# Patient Record
Sex: FEMALE | Race: WHITE | NOT HISPANIC OR LATINO | Employment: UNEMPLOYED | URBAN - METROPOLITAN AREA
[De-identification: names, ages, dates, MRNs, and addresses within clinical notes are randomized per-mention and may not be internally consistent; named-entity substitution may affect disease eponyms.]

---

## 2019-08-27 ENCOUNTER — OFFICE VISIT (OUTPATIENT)
Dept: INTERNAL MEDICINE | Facility: CLINIC | Age: 24
End: 2019-08-27

## 2019-08-27 VITALS
OXYGEN SATURATION: 97 % | RESPIRATION RATE: 16 BRPM | TEMPERATURE: 97.8 F | HEIGHT: 66 IN | DIASTOLIC BLOOD PRESSURE: 72 MMHG | HEART RATE: 83 BPM | BODY MASS INDEX: 40.63 KG/M2 | WEIGHT: 252.8 LBS | SYSTOLIC BLOOD PRESSURE: 122 MMHG

## 2019-08-27 DIAGNOSIS — D22.9 CHANGE IN MOLE: Primary | ICD-10-CM

## 2019-08-27 DIAGNOSIS — Z85.820 HISTORY OF MELANOMA: ICD-10-CM

## 2019-08-27 DIAGNOSIS — Z3A.28 28 WEEKS GESTATION OF PREGNANCY: ICD-10-CM

## 2019-08-27 DIAGNOSIS — Z13.89 ENCOUNTER FOR SCREENING FOR OTHER DISORDER: ICD-10-CM

## 2019-08-27 PROBLEM — Z33.1 PREGNANCY, INCIDENTAL: Status: ACTIVE | Noted: 2019-08-27

## 2019-08-27 LAB
ALBUMIN SERPL-MCNC: 4 G/DL (ref 3.5–5.2)
ALBUMIN/GLOB SERPL: 1.3 G/DL
ALP SERPL-CCNC: 75 U/L (ref 39–117)
ALT SERPL W P-5'-P-CCNC: 7 U/L (ref 1–33)
ANION GAP SERPL CALCULATED.3IONS-SCNC: 11.7 MMOL/L (ref 5–15)
AST SERPL-CCNC: 15 U/L (ref 1–32)
BASOPHILS # BLD AUTO: 0.04 10*3/MM3 (ref 0–0.2)
BASOPHILS NFR BLD AUTO: 0.3 % (ref 0–1.5)
BILIRUB SERPL-MCNC: 0.2 MG/DL (ref 0.2–1.2)
BUN BLD-MCNC: 6 MG/DL (ref 6–20)
BUN/CREAT SERPL: 11.5 (ref 7–25)
CALCIUM SPEC-SCNC: 9.6 MG/DL (ref 8.6–10.5)
CHLORIDE SERPL-SCNC: 102 MMOL/L (ref 98–107)
CHOLEST SERPL-MCNC: 231 MG/DL (ref 0–200)
CO2 SERPL-SCNC: 22.3 MMOL/L (ref 22–29)
CREAT BLD-MCNC: 0.52 MG/DL (ref 0.57–1)
DEPRECATED RDW RBC AUTO: 47.2 FL (ref 37–54)
EOSINOPHIL # BLD AUTO: 0.06 10*3/MM3 (ref 0–0.4)
EOSINOPHIL NFR BLD AUTO: 0.4 % (ref 0.3–6.2)
ERYTHROCYTE [DISTWIDTH] IN BLOOD BY AUTOMATED COUNT: 14 % (ref 12.3–15.4)
GFR SERPL CREATININE-BSD FRML MDRD: 145 ML/MIN/1.73
GLOBULIN UR ELPH-MCNC: 3 GM/DL
GLUCOSE BLD-MCNC: 95 MG/DL (ref 65–99)
HCT VFR BLD AUTO: 39.2 % (ref 34–46.6)
HDLC SERPL-MCNC: 58 MG/DL (ref 40–60)
HGB BLD-MCNC: 12.2 G/DL (ref 12–15.9)
IMM GRANULOCYTES # BLD AUTO: 0.09 10*3/MM3 (ref 0–0.05)
IMM GRANULOCYTES NFR BLD AUTO: 0.6 % (ref 0–0.5)
LDLC SERPL CALC-MCNC: 115 MG/DL (ref 0–100)
LDLC/HDLC SERPL: 1.98 {RATIO}
LYMPHOCYTES # BLD AUTO: 3.41 10*3/MM3 (ref 0.7–3.1)
LYMPHOCYTES NFR BLD AUTO: 23 % (ref 19.6–45.3)
MCH RBC QN AUTO: 29.1 PG (ref 26.6–33)
MCHC RBC AUTO-ENTMCNC: 31.1 G/DL (ref 31.5–35.7)
MCV RBC AUTO: 93.6 FL (ref 79–97)
MONOCYTES # BLD AUTO: 0.84 10*3/MM3 (ref 0.1–0.9)
MONOCYTES NFR BLD AUTO: 5.7 % (ref 5–12)
NEUTROPHILS # BLD AUTO: 10.38 10*3/MM3 (ref 1.7–7)
NEUTROPHILS NFR BLD AUTO: 70 % (ref 42.7–76)
NRBC BLD AUTO-RTO: 0 /100 WBC (ref 0–0.2)
PLATELET # BLD AUTO: 525 10*3/MM3 (ref 140–450)
PMV BLD AUTO: 12.3 FL (ref 6–12)
POTASSIUM BLD-SCNC: 4 MMOL/L (ref 3.5–5.2)
PROT SERPL-MCNC: 7 G/DL (ref 6–8.5)
RBC # BLD AUTO: 4.19 10*6/MM3 (ref 3.77–5.28)
SODIUM BLD-SCNC: 136 MMOL/L (ref 136–145)
T4 FREE SERPL-MCNC: 1.08 NG/DL (ref 0.93–1.7)
TRIGL SERPL-MCNC: 291 MG/DL (ref 0–150)
TSH SERPL DL<=0.05 MIU/L-ACNC: 1.79 MIU/ML (ref 0.27–4.2)
VLDLC SERPL-MCNC: 58.2 MG/DL (ref 5–40)
WBC NRBC COR # BLD: 14.82 10*3/MM3 (ref 3.4–10.8)

## 2019-08-27 PROCEDURE — 99204 OFFICE O/P NEW MOD 45 MIN: CPT | Performed by: NURSE PRACTITIONER

## 2019-08-27 PROCEDURE — 85025 COMPLETE CBC W/AUTO DIFF WBC: CPT | Performed by: NURSE PRACTITIONER

## 2019-08-27 PROCEDURE — 80061 LIPID PANEL: CPT | Performed by: NURSE PRACTITIONER

## 2019-08-27 PROCEDURE — 84443 ASSAY THYROID STIM HORMONE: CPT | Performed by: NURSE PRACTITIONER

## 2019-08-27 PROCEDURE — 80053 COMPREHEN METABOLIC PANEL: CPT | Performed by: NURSE PRACTITIONER

## 2019-08-27 PROCEDURE — 84439 ASSAY OF FREE THYROXINE: CPT | Performed by: NURSE PRACTITIONER

## 2019-08-27 RX ORDER — ACETAMINOPHEN 500 MG
500 TABLET ORAL EVERY 6 HOURS PRN
COMMUNITY
End: 2021-08-04

## 2019-08-27 RX ORDER — CALCIUM CARBONATE 200(500)MG
1 TABLET,CHEWABLE ORAL DAILY
COMMUNITY
End: 2020-02-17

## 2019-08-27 RX ORDER — LANOLIN ALCOHOL/MO/W.PET/CERES
1000 CREAM (GRAM) TOPICAL DAILY
COMMUNITY
End: 2020-02-17

## 2019-08-27 NOTE — PROGRESS NOTES
"Subjective:    Genesis Bowden is a 24 y.o. female.     Chief Complaint   Patient presents with   • Establish Care   • Referral to Derm        History of Present Illness   Patient present with significant other to establish care and request dermatology referral for multiple moles that have changed. Moles with color changes and shape changes/dark dots. Mole on top of right ear and right lower cheek/jaw bleed when caught on objects. She reports she had melanoma in 2008 that was treated as \"metastatic breast cancer\" due to being in breast tissue with radiation and reconstruction surgeries. She reports she was treated in [place], [place]. Partner is particularly concerned about mole changes.     She has been in [place] one year.     She reports she is 28 weeks pregnant and attends [facility] and sees Dr Sourav Lopez, OB/GYN, with 11/12/2019 caesarean section scheduled. She has a 3 year old son and reports expected child is son as well.      Current Outpatient Medications:   •  acetaminophen (TYLENOL) 500 MG tablet, Take 500 mg by mouth Every 6 (Six) Hours As Needed for Mild Pain ., Disp: , Rfl:   •  calcium carbonate (TUMS) 500 MG chewable tablet, Chew 1 tablet Daily., Disp: , Rfl:   •  Prenatal Vit-Fe Fumarate-FA (PRENATAL COMPLETE PO), Take  by mouth., Disp: , Rfl:   •  vitamin B-12 (CYANOCOBALAMIN) 1000 MCG tablet, Take 1,000 mcg by mouth Daily., Disp: , Rfl:      The following portions of the patient's history were reviewed and updated as appropriate: allergies, current medications, past family history, past medical history, past social history, past surgical history and problem list.    Review of Systems   Constitutional: Negative for chills, fatigue and fever.   HENT: Negative for congestion, ear pain, postnasal drip, rhinorrhea, sinus pressure, sneezing and sore throat.    Eyes: Negative for pain, discharge, redness and itching.   Respiratory: Negative for cough, chest tightness, " "shortness of breath and wheezing.    Cardiovascular: Negative for chest pain.   Gastrointestinal: Negative for abdominal pain, diarrhea, nausea and vomiting.   Endocrine: Negative.    Genitourinary:        Currently pregnant   Musculoskeletal: Negative for arthralgias and myalgias.   Skin: Negative for rash.        Multiple moles   Allergic/Immunologic: Positive for environmental allergies.   Neurological: Negative for headaches.   Hematological: Negative for adenopathy.       Objective:    /72   Pulse 83   Temp 97.8 °F (36.6 °C) (Temporal)   Resp 16   Ht 167.6 cm (66\")   Wt 115 kg (252 lb 12.8 oz)   SpO2 97%   Breastfeeding? No Comment: pregnant  BMI 40.80 kg/m²     Physical Exam   Constitutional: She is oriented to person, place, and time. She appears well-developed and well-nourished. She is active and cooperative.  Non-toxic appearance. She does not have a sickly appearance. She does not appear ill. No distress.   HENT:   Head: Normocephalic and atraumatic.   Right Ear: Tympanic membrane, external ear and ear canal normal.   Left Ear: Tympanic membrane, external ear and ear canal normal.   Nose: Nose normal.   Mouth/Throat: Uvula is midline, oropharynx is clear and moist and mucous membranes are normal. No oral lesions.   Bulbous nevus at right pinna   Eyes: Conjunctivae and lids are normal.   Neck: Normal range of motion. Neck supple.   Cardiovascular: Normal rate and regular rhythm.   No murmur heard.  Pulmonary/Chest: Effort normal and breath sounds normal.   Lymphadenopathy:     She has no cervical adenopathy.   Neurological: She is alert and oriented to person, place, and time.   Skin: Skin is warm, dry and intact. No rash noted.   Nevi too multiple to count with varied color, shape and irregularities. Multiple scars on bilateral breasts. Bulbous nevus at right pinna and right lower cheek/jawline.     Psychiatric: She has a normal mood and affect.   Nursing note and vitals " reviewed.      Assessment/Plan:    Genesis was seen today for establish care and referral to derm.    Diagnoses and all orders for this visit:    Change in mole  -     Ambulatory Referral to Dermatology    Encounter for screening for other disorder  -     T4, Free  -     TSH  -     CBC & Differential  -     Comprehensive Metabolic Panel  -     Lipid Panel  -     CBC Auto Differential    History of melanoma  -     Ambulatory Referral to Dermatology    28 weeks gestation of pregnancy    Continue prenatal care, vitamin and sign release for medical records.     Return in about 1 month (around 9/27/2019), or if symptoms worsen or fail to improve, schedule physical.

## 2020-02-17 ENCOUNTER — OFFICE VISIT (OUTPATIENT)
Dept: INTERNAL MEDICINE | Facility: CLINIC | Age: 25
End: 2020-02-17

## 2020-02-17 VITALS
BODY MASS INDEX: 39.26 KG/M2 | SYSTOLIC BLOOD PRESSURE: 120 MMHG | HEIGHT: 67 IN | OXYGEN SATURATION: 98 % | TEMPERATURE: 97.5 F | RESPIRATION RATE: 20 BRPM | DIASTOLIC BLOOD PRESSURE: 76 MMHG | WEIGHT: 250.13 LBS | HEART RATE: 105 BPM

## 2020-02-17 DIAGNOSIS — N63.0 BREAST MASS: Primary | ICD-10-CM

## 2020-02-17 DIAGNOSIS — N64.4 BREAST PAIN, LEFT: ICD-10-CM

## 2020-02-17 PROCEDURE — 99213 OFFICE O/P EST LOW 20 MIN: CPT | Performed by: INTERNAL MEDICINE

## 2020-02-17 NOTE — PROGRESS NOTES
Subjective   Genesis Chapin is a 24 y.o. female.     History of Present Illness     The following portions of the patient's history were reviewed and updated as appropriate: allergies, current medications, past family history, past medical history, past social history, past surgical history and problem list.    Establish visit    Left breast pain   Duration 1week   Sx Patient says that she felt the pain around her lateral side of breast and palpation in that area.  Patient denies any trauma to this particular region, no nipple discharge, no swelling, no erythema, no other systemic signs.      Family History   Aunt (maternal) stage 2 breast cancer  Mother had an abnormal mammogram, dx cyst and bx was unkown    Past Medical History   Malignant melanoma of the breast age 13       Social History: No EtOH consumption, no IV drug use, no marijuana, no illicit drugs    Review of Systems   Constitutional: Negative for appetite change, chills, fatigue, fever and unexpected weight gain.   Respiratory: Negative for cough, chest tightness, shortness of breath and wheezing.    Gastrointestinal: Negative for abdominal pain, constipation, nausea, rectal pain and vomiting.   Genitourinary: Positive for breast pain. Negative for breast discharge.   All other systems reviewed and are negative.      Objective   Physical Exam   Constitutional: She is oriented to person, place, and time. She appears well-developed and well-nourished.   HENT:   Head: Normocephalic.   Right Ear: External ear normal.   Left Ear: External ear normal.   Nose: Nose normal.   Mouth/Throat: Oropharynx is clear and moist.   Eyes: Pupils are equal, round, and reactive to light. Conjunctivae and EOM are normal.   Neck: Normal range of motion. Neck supple.   Cardiovascular: Normal rate, regular rhythm, normal heart sounds and intact distal pulses.   Pulmonary/Chest: Effort normal and breath sounds normal.   Musculoskeletal: Normal range of motion.   Breast exam  revealed large pendulous breasts.  Soreness in discomfort/pain palpated between 1 and 3:00 on the lateral aspect of left breast approximately 3 cm from the nipple.   Neurological: She is alert and oriented to person, place, and time.   Nursing note and vitals reviewed.        Assessment/Plan   Genesis was seen today for breast pain.    Diagnoses and all orders for this visit:    Breast mass  -     US breast left complete; Future    Breast pain, left  -     US breast left complete; Future  -     Ambulatory Referral to Genetic Counseling (Arjun)    Also recommended patient to continue with NSAIDs and warm compress to the breast area to see if this will provide some relief.

## 2020-02-26 NOTE — ADDENDUM NOTE
Addended by: DORIS BLACKWELL on: 2/26/2020 03:46 AM     Modules accepted: Level of Service     boyfriend

## 2020-02-27 ENCOUNTER — HOSPITAL ENCOUNTER (OUTPATIENT)
Dept: ULTRASOUND IMAGING | Facility: HOSPITAL | Age: 25
Discharge: HOME | End: 2020-02-27
Admitting: INTERNAL MEDICINE

## 2020-02-27 DIAGNOSIS — N63.0 BREAST MASS: ICD-10-CM

## 2020-02-27 DIAGNOSIS — N64.4 BREAST PAIN, LEFT: ICD-10-CM

## 2020-02-27 PROCEDURE — 76642 ULTRASOUND BREAST LIMITED: CPT | Performed by: RADIOLOGY

## 2020-02-27 PROCEDURE — 76642 ULTRASOUND BREAST LIMITED: CPT

## 2020-05-13 ENCOUNTER — TELEMEDICINE (OUTPATIENT)
Dept: INTERNAL MEDICINE | Facility: CLINIC | Age: 25
End: 2020-05-13

## 2020-05-13 DIAGNOSIS — G89.29 CHRONIC BILATERAL LOW BACK PAIN WITHOUT SCIATICA: ICD-10-CM

## 2020-05-13 DIAGNOSIS — M54.50 CHRONIC BILATERAL LOW BACK PAIN WITHOUT SCIATICA: ICD-10-CM

## 2020-05-13 PROCEDURE — 99214 OFFICE O/P EST MOD 30 MIN: CPT | Performed by: INTERNAL MEDICINE

## 2020-05-13 RX ORDER — ESCITALOPRAM OXALATE 10 MG/1
10 TABLET ORAL DAILY
Qty: 30 TABLET | Refills: 3 | Status: SHIPPED | OUTPATIENT
Start: 2020-05-13 | End: 2021-02-12

## 2020-05-13 RX ORDER — TRAMADOL HYDROCHLORIDE 50 MG/1
50 TABLET ORAL EVERY 6 HOURS PRN
Qty: 60 TABLET | Refills: 2 | Status: SHIPPED | OUTPATIENT
Start: 2020-05-13 | End: 2020-07-20 | Stop reason: SDUPTHER

## 2020-05-13 NOTE — PROGRESS NOTES
Subjective   Genesis Chapin is a 25 y.o. female.     History of Present Illness     The following portions of the patient's history were reviewed and updated as appropriate: allergies, current medications, past family history, past medical history, past social history, past surgical history and problem list.       Patient has consented to a video visit.  This video visit was initiated using [email or web address].    1 Post partum depression concerns.  Patient says that she has been having extreme mood swings, feeling tired, complete sadness, and not engaging with her children.  Patient says that she does not have any suicidal ideations, emotional liability threats towards everybody else, or hallucinations.    2 back pain -chronic, but since her last baby delivery her pain has gotten worse patient says that she has been having upper back pain and lower back pain which is made worse with performing activities of daily living, patient has not been able to perform these particular activities of daily living and it has affected her overall quality of life.  Patient has been taking pain medications as needed and this is provided minimal relief.    Past medical History  Depression-previously has been diagnosed with depression and was on Lexapro and Xanax.  Lexapro very well for her depression but Xanax caused excessive fatigue    Family History  Depression  Anxiety      Review of Systems   Psychiatric/Behavioral: Positive for decreased concentration, depressed mood and stress. Negative for agitation, behavioral problems, dysphoric mood, hallucinations, self-injury, sleep disturbance and suicidal ideas. The patient is nervous/anxious.        Objective   Physical Exam   Constitutional: She is oriented to person, place, and time. She appears well-developed and well-nourished.   HENT:   Head: Normocephalic and atraumatic.   Nose: Nose normal.   Mouth/Throat: Oropharynx is clear and moist.   Eyes: Pupils are equal, round, and reactive to  light. EOM are normal.   Cardiovascular: Normal rate, regular rhythm and normal heart sounds.   Pulmonary/Chest: Effort normal and breath sounds normal.   Neurological: She is alert and oriented to person, place, and time.   Psychiatric: She has a normal mood and affect. Her behavior is normal. Judgment and thought content normal.   Nursing note and vitals reviewed.        Assessment/Plan   Diagnoses and all orders for this visit:    Spent 30 minutes face-to-face with video chat with patient, 25 minutes used specifically for review of treatment and management for depression.    Postpartum depression  (new medication start)  -     escitalopram (Lexapro) 10 MG tablet; Take 1 tablet by mouth Daily.    Side effects and precautions of the new medication(s) have been discussed with patient  I have answered patients questions thoroughly to their understanding.  If patient should experience any side effects from the medication, a follow up appointment should be made.        Chronic bilateral low back pain without sciatica  -     traMADol (ULTRAM) 50 MG tablet; Take 1 tablet by mouth Every 6 (Six) Hours As Needed for Moderate Pain .      Side effects and precautions of the new medication(s) have been discussed with patient  I have answered patients questions thoroughly to their understanding.  If patient should experience any side effects from the medication, a follow up appointment should be made.

## 2020-05-14 ENCOUNTER — CLINICAL SUPPORT (OUTPATIENT)
Dept: GENETICS | Facility: HOSPITAL | Age: 25
End: 2020-05-14

## 2020-05-14 DIAGNOSIS — Z85.820 HISTORY OF MELANOMA: ICD-10-CM

## 2020-05-14 DIAGNOSIS — Z80.3 FAMILY HISTORY OF BREAST CANCER: ICD-10-CM

## 2020-05-14 DIAGNOSIS — Z80.42 FAMILY HISTORY OF PROSTATE CANCER: ICD-10-CM

## 2020-05-14 DIAGNOSIS — Z13.79 GENETIC TESTING: Primary | ICD-10-CM

## 2020-06-08 ENCOUNTER — DOCUMENTATION (OUTPATIENT)
Dept: GENETICS | Facility: HOSPITAL | Age: 25
End: 2020-06-08

## 2020-06-08 NOTE — PROGRESS NOTES
Genesis Chapin, a 25-year-old female, was provided genetic counseling via phone consult due to a personal and family history of cancer. Ms. Chapin was diagnosed with melanoma at age 13 involving the skin of her right chest for which she underwent multiple surgeries. She was 12 years old at menarche and had her first child at 21. She retains her uterus and ovaries. Her current cancer screening includes annual clinical breast exam. She was interested in discussing her risk for a hereditary cancer syndrome. Ms. Chapin was interested in pursuing a multi-gene panel, and therefore the CancerNext-Expanded panel was ordered through [facility] which analyzes 67 genes associated with an increased cancer risk. Genetic testing was negative for pathogenic mutations in BRCA1/2 and 65 additional genes included on the CancerNext-Expanded panel. These normal results were discussed with Ms. Chapin by telephone on 6/8/2020.    FAMILY HISTORY (see attached pedigree):    Mat. Aunt:  Breast cancer, 36  Mat. Grandfather: Prostate cancer, 62    We do not have medical records confirming the diagnoses in Ms. Chapin’s family.    RISK ASSESSMENT: Ms. Chapin’s family history led to concern regarding a hereditary cancer syndrome. She clearly meets NCCN guidelines criteria for BRCA1/2 testing based on her family history of early onset breast cancer. Ms. Chapin opted to pursue multigene panel testing via the CancerNext-Expanded panel. If genetic testing is negative, Ms. Olivers management should be guided by family history.     At this time, Ms. Olivers lifetime risk of developing breast cancer should be assessed using family history-based risk assessment models. Using these models, Ms. Olivers breast cancer risk is estimated to be up to 13.3% (ANUJA/Rut), in comparison to the general population risk of 12.5%.  Per NCCN guidelines, a risk greater than 20% is considered high risk and warrants increased screening; Ms.  Tavares does not fall into this category. The assessments could change in the future should new information be obtained.    GENETIC COUNSELING:  We reviewed the family history information in detail. Cases of cancer follow three general patterns: sporadic, familial, and hereditary.  While most breast cancer is sporadic, some cases appear to occur in family clusters.  These cases are said to be familial and account for 10-20% of cancer cases.  Familial cases may be due to a combination of shared genes and environmental factors among family members.  In even fewer families, the cancer is said to be inherited, and the genes responsible for the cancer are known.      Family histories typical of hereditary cancer syndromes usually include multiple first- and second-degree relatives diagnosed with cancer types that define a syndrome. These cases tend to be diagnosed at younger-than-expected ages and can be bilateral or multifocal. The cancer in these families follows an autosomal dominant inheritance pattern, which indicates the likely presence of a mutation in a cancer susceptibility gene. Children and siblings of an individual believed to carry this mutation have a 50% chance of inheriting that mutation, thereby inheriting the increased risk to develop cancer. These mutations can be passed down from the maternal or the paternal lineage.    Based on Ms. Chapin’s family history of breast and ovarian cancer, we discussed that hereditary breast and ovarian cancer account for 5-10% of all cases. A significant proportion can be attributed to mutations in the BRCA1 and BRCA2 genes.  Mutations in these genes confer an increased risk for breast cancer, ovarian cancer, male breast cancer, prostate cancer, melanoma, and pancreatic cancer.  Women with a BRCA1 or BRCA2 mutation have up to an 87% lifetime risk of breast cancer and up to a 44% risk of ovarian cancer.   There are other genes, CDK4 and CDKN2A, which have been associated  with hereditary melanoma.      We discussed that there are other, more rare, hereditary cancer syndromes. Some of these conditions have well defined cancer risks and established management guidelines.  Other genes that can be tested for have been more recently described, and there may be less data regarding the risks and therefore may not have established management guidelines.  We discussed these limitations at length. Based on Ms. Chapin’s family history of cancer and her desire to get more information regarding her personal risks she opted to pursue testing through a panel evaluating several other genes known to increase the risk for cancer.    GENETIC TESTING:  The risks, benefits and limitations of genetic testing and implications for clinical management following testing were reviewed. DNA test results can influence decisions regarding screening and prevention.  Genetic testing can have significant psychological implications for both individuals and families. Also discussed was the possibility of employment and insurance discrimination based on genetic test results and the federal and states laws that are in place to prevent this as well as the limitations of this law (HELENA).         We discussed panel testing, which would involve testing for BRCA1/2 as well as 65 additional genes that are associated with increased cancer risk. The genes on this panel include AIP, ALK, APC, CRISTI, BAP1, BARD1, BLM, BMPR1A, BRCA1, BRCA2, BRIP1, CDH1, CDK4, CDKN1B, CDKN2A, CHEK2, DICER1, EPCAM, FANCC, FH, FLCN, GALNT12, GREM1, HOXB13, MAX, MEN1, MET, MITF, MLH1, MRE11A, MSH2, MSH6, MUTYH, NBN, NF1, NF2, PALB2, PHOX2B, PMS2, POLD1, POLE, POT1, GBGCN2Z, PTCH1, PTEN, RAD50, RAD51C, RAD51D, RB1, RET, SDHA, SDHAF2, SDHB, SDHC, SDHD, SMAD4, SMARCA4, SMARCB1, SMARCE1, STK11, SUFU, WAEW376, TP53, TSC1, TSC2, VHL, and XRCC2.   The benefits and limitations of genetic testing were discussed and Ms. Chapin decided to pursue testing via the  panel. The implications of a positive or negative test result were discussed. We discussed the possibility that, in some cases, genetic test results may be informative or may be ambiguous due to the identification of a genetic variant. These variants may or may not be associated with an increased cancer risk.  With multigene panel testing, it is not uncommon for a variant of uncertain significance (VUS) to be identified.  If a VUS is identified, testing family members is typically not recommended and screening recommendations are made based on the family history.  The laboratories that perform genetic testing work to reclassify the VUS and send out an amended report if and when a VUS is reclassified.  The majority of variant findings are ultimately reclassified to a negative result.  Given her personal and family history, a negative test result would not eliminate all cancer risk to her relatives, although the risk would not be as high as it would with positive genetic testing.      TEST RESULTS: Genetic testing was negative for known pathogenic mutations by sequencing and rearrangement testing for the 67 genes on the CancerNext-Expanded panel.  This negative result greatly lowers, but does not eliminate, the risk of a hereditary cancer syndrome for Ms. Chapin. It is possible that the family history is due to a hereditary cancer syndrome that Ms. Chapin did not happen to inherit. If a relative of Ms. Chapin were to have testing and was found to carry a mutation in a gene included on this panel in the future, Ms. Chapin’s risk assessment would need to be updated. This assessment is based on the information provided at the time of the consultation.    CANCER PREVENTION:  Options available to individuals with an elevated lifetime risk for breast and/or ovarian cancer were briefly discussed, including increased surveillance, chemoprevention and prophylactic surgery (mastectomy and/or oophorectomy).  Based on  computer modeling, Ms. Chapin’s lifetime risk for breast cancer would not be considered “high risk”. She should follow general population screening guidelines for her breast cancer risk, including annual clinical breast exam, annual mammogram, and self-breast exam. Annual mammography typically begins ten years prior to the youngest breast cancer diagnosis in a close relative. This assessment is based on the information provided at the time of the consultation and could change should new information become available.    PLAN:  Genetic counseling remains available to Ms. Chapin and her family. If Ms. Chapin has any questions or concerns, she is welcome to contact our office at [phone].      Tanesha Marte MS, [facility], formerly [facility]  Licensed Certified Genetic Counselor       Cc:  Genesis Hoffman MD

## 2020-06-15 ENCOUNTER — TELEMEDICINE (OUTPATIENT)
Dept: INTERNAL MEDICINE | Facility: CLINIC | Age: 25
End: 2020-06-15

## 2020-06-15 DIAGNOSIS — M54.50 CHRONIC BILATERAL LOW BACK PAIN WITHOUT SCIATICA: Primary | ICD-10-CM

## 2020-06-15 DIAGNOSIS — F33.0 MILD EPISODE OF RECURRENT MAJOR DEPRESSIVE DISORDER (HCC): ICD-10-CM

## 2020-06-15 DIAGNOSIS — N62 LARGE BREASTS: ICD-10-CM

## 2020-06-15 DIAGNOSIS — F41.9 ANXIETY: ICD-10-CM

## 2020-06-15 DIAGNOSIS — G89.29 CHRONIC BILATERAL LOW BACK PAIN WITHOUT SCIATICA: Primary | ICD-10-CM

## 2020-06-15 PROCEDURE — 99214 OFFICE O/P EST MOD 30 MIN: CPT | Performed by: INTERNAL MEDICINE

## 2020-06-15 RX ORDER — HYDROXYZINE 50 MG/1
50 TABLET, FILM COATED ORAL 3 TIMES DAILY PRN
Qty: 50 TABLET | Refills: 3 | Status: SHIPPED | OUTPATIENT
Start: 2020-06-15 | End: 2021-02-12

## 2020-06-15 NOTE — PROGRESS NOTES
Subjective   Genesis Chapin is a 25 y.o. female.     History of Present Illness     The following portions of the patient's history were reviewed and updated as appropriate: allergies, current medications, past family history, past medical history, past social history, past surgical history and problem list.    Patient has consented to a video visit.  This video visit was initiated using [email or web address].    1 depression-depression has been doing very well.  Patient reports no worsening depression, no suicidal ideations, no sadness, overall she says she is doing very well with a counselor depression    2 anxiety-chronic with mild exacerbation.  Patient says that even though her depression is getting better she continues to have episodes of anxiety.    3 upper back pain with large bus.  Patient says that she continues to have upper back pain and upper extremity fatigue associated with physical activity a lot of her symptoms seem to be due to her large breast size or bust  She currently wears a triple D and bra size.  Patient says that her back pain, upper extremity weakness, and difficulty with wearing bras    Review of Systems   All other systems reviewed and are negative.      Objective   Physical Exam   Constitutional: She appears well-developed and well-nourished.   HENT:   Head: Normocephalic and atraumatic.   Right Ear: External ear normal.   Left Ear: External ear normal.   Nose: Nose normal.   Mouth/Throat: Oropharynx is clear and moist.   Eyes: Pupils are equal, round, and reactive to light. Conjunctivae and EOM are normal.   Neck: Normal range of motion. Neck supple.   Cardiovascular: Normal rate, regular rhythm, normal heart sounds and intact distal pulses.   Pulmonary/Chest: Effort normal and breath sounds normal.   Musculoskeletal: Normal range of motion. She exhibits tenderness.   Localized to the upper back   Neurological: She is alert.   Skin: Skin is warm.   Nursing note and vitals  reviewed.        Assessment/Plan   Diagnoses and all orders for this visit:    Spent 25 minutes face-to-face via video with patient, at least 20 minutes were used to discuss depression and anxiety    Chronic bilateral low back pain without sciatica- start on diclofenac 50 mg by mouth 1 tablet by mouth 3 times a day as needed for back pain    Anxiety  ( new medication start)  -     hydrOXYzine (ATARAX) 50 MG tablet; Take 1 tablet by mouth 3 (Three) Times a Day As Needed for Anxiety.    Continue on the Lexapro    Mild episode of recurrent major depressive disorder (CMS/HCC)-continue-continue with close monitoring of symptoms

## 2020-07-20 ENCOUNTER — TELEPHONE (OUTPATIENT)
Dept: INTERNAL MEDICINE | Facility: CLINIC | Age: 25
End: 2020-07-20

## 2020-07-20 DIAGNOSIS — G89.29 CHRONIC BILATERAL LOW BACK PAIN WITHOUT SCIATICA: ICD-10-CM

## 2020-07-20 DIAGNOSIS — M54.50 CHRONIC BILATERAL LOW BACK PAIN WITHOUT SCIATICA: ICD-10-CM

## 2020-07-22 ENCOUNTER — TELEPHONE (OUTPATIENT)
Dept: INTERNAL MEDICINE | Facility: CLINIC | Age: 25
End: 2020-07-22

## 2020-07-22 RX ORDER — TRAMADOL HYDROCHLORIDE 50 MG/1
50 TABLET ORAL EVERY 6 HOURS PRN
Qty: 60 TABLET | Refills: 2 | Status: SHIPPED | OUTPATIENT
Start: 2020-07-22 | End: 2021-02-12

## 2020-07-22 NOTE — TELEPHONE ENCOUNTER
----- Message from Wesley Hoffman MD sent at 7/22/2020  1:08 PM EDT -----  Regarding: Medication  Please tell patient that her medication tramadol has been called in

## 2020-08-13 ENCOUNTER — TELEPHONE (OUTPATIENT)
Dept: INTERNAL MEDICINE | Facility: CLINIC | Age: 25
End: 2020-08-13

## 2020-08-13 NOTE — TELEPHONE ENCOUNTER
PATIENT STATES SHE IS FOUND OUT YESTERDAY (8/12/2020) THAT SHE IS PREGNANT AND WANTS TO KNOW IF IT IS SAFE FOR HER TO TAKE THE FOLLOWING MEDICATIONS.    escitalopram (Lexapro) 10 MG tablet  traMADol (ULTRAM) 50 MG tablet  hydrOXYzine (ATARAX) 50 MG tablet    PATIENT IS WANTING TO KNOW IF THERE IS A SAFER ALTERNATIVE     PLEASE CALL AND ADVISE   [phone]

## 2020-08-14 NOTE — TELEPHONE ENCOUNTER
Mental health- One thing that I would caution with and should be discussed with the OB is continuing SSRIs i.e. Lexapro during pregnancy for mother's mental health.  There are a certain percentage of women during pregnancy that experience worsening of anxiety and depression and to continue taking antidepressants out of respect of making sure that mom is mentally healthy throughout the pregnancy.  She should discuss this with her OB and if she still has questions or concerns I would be more than welcome to discuss with her further.

## 2020-08-14 NOTE — TELEPHONE ENCOUNTER
Patient has been given providers message and stated that she will just not take them while pregnant but wanted to make sure after she has the baby that she can get them refilled then because she has post partum with her last baby

## 2020-08-14 NOTE — TELEPHONE ENCOUNTER
All of the medications listed are category C for pregnancy- which means that patient be used with great caution.  The best thing is to talk to her OB and give her a list of medications that are appropriate for pregnancy as well as review her current list of medications with the OB.

## 2021-02-12 ENCOUNTER — TELEMEDICINE (OUTPATIENT)
Dept: INTERNAL MEDICINE | Facility: CLINIC | Age: 26
End: 2021-02-12

## 2021-02-12 DIAGNOSIS — D84.9 IMMUNOCOMPROMISED (HCC): ICD-10-CM

## 2021-02-12 DIAGNOSIS — R50.9 FEVER AND CHILLS: Primary | ICD-10-CM

## 2021-02-12 DIAGNOSIS — Z90.81 HISTORY OF SPLENECTOMY: ICD-10-CM

## 2021-02-12 DIAGNOSIS — R05.9 COUGH: ICD-10-CM

## 2021-02-12 LAB
EXPIRATION DATE: NORMAL
FLUAV AG NPH QL: NEGATIVE
FLUBV AG NPH QL: NEGATIVE
INTERNAL CONTROL: NORMAL
Lab: 7243

## 2021-02-12 PROCEDURE — 87804 INFLUENZA ASSAY W/OPTIC: CPT | Performed by: PHYSICIAN ASSISTANT

## 2021-02-12 PROCEDURE — U0004 COV-19 TEST NON-CDC HGH THRU: HCPCS | Performed by: PHYSICIAN ASSISTANT

## 2021-02-12 PROCEDURE — 99213 OFFICE O/P EST LOW 20 MIN: CPT | Performed by: PHYSICIAN ASSISTANT

## 2021-02-12 NOTE — PROGRESS NOTES
Chief Complaint   Patient presents with   • Fever       Subjective     You have chosen to receive care through a telehealth visit.  Do you consent to use a video/audio connection for your medical care today? Yes      History of Present Illness     Genesis Chapin is a 25 y.o. female. She presents with 2 day history of fever, chills, body aches, congestion, headache, slight dry cough. She has decreased taste and smell but not complete loss. She denies SOA, abdominal pain, N/V/D. She has been unable to check her temp as she does not have a thermometer at home, but has been feeling feverish, alternating sweats and cold chills. She was potentially exposed to Covid. Her son's paternal grandfather was dx with Covid <2 weeks ago, and her son spent the day with him. Pt was briefly exposed to grandfather for a few minutes that day, 2 days before his diagnosis. None of pt's children or spouse have sx. She has tried Tylenol which has helped fever and body aches.       The following portions of the patient's history were reviewed and updated as appropriate: allergies, current medications, past social history, past surgical history and problem list.    Allergies   Allergen Reactions   • Amoxicillin Nausea And Vomiting   • Penicillins Nausea And Vomiting     Social History     Tobacco Use   • Smoking status: Former Smoker     Types: Electronic Cigarette   • Smokeless tobacco: Never Used   • Tobacco comment: stop vaping 2019   Substance Use Topics   • Alcohol use: No     Frequency: Never     Comment: yes -  prior to preg / none since becoming preg          Current Outpatient Medications:   •  acetaminophen (TYLENOL) 500 MG tablet, Take 500 mg by mouth Every 6 (Six) Hours As Needed for Mild Pain ., Disp: , Rfl:     Review of Systems   Constitutional: Positive for appetite change, chills, fatigue and fever.        +body aches   HENT: Positive for congestion. Negative for ear pain, sore throat and trouble swallowing.    Eyes:  Negative for pain.   Respiratory: Positive for cough. Negative for shortness of breath and wheezing.    Cardiovascular: Negative for chest pain and palpitations.   Gastrointestinal: Negative for abdominal pain, diarrhea, nausea and vomiting.   Genitourinary: Negative for dysuria.   Skin: Negative for rash.   Allergic/Immunologic: Positive for immunocompromised state.   Neurological: Positive for headache. Negative for dizziness, syncope and weakness.       Objective   There were no vitals filed for this visit.  Physical Exam  Constitutional:       Appearance: Normal appearance.   HENT:      Right Ear: External ear normal.      Left Ear: External ear normal.   Eyes:      Conjunctiva/sclera: Conjunctivae normal.   Pulmonary:      Effort: Pulmonary effort is normal. No respiratory distress.      Breath sounds: No wheezing.   Neurological:      Mental Status: She is alert.   Psychiatric:         Mood and Affect: Mood normal.               Assessment/Plan   Diagnoses and all orders for this visit:    1. Fever and chills (Primary)  -     POC Influenza A / B  -     COVID-19 PCR, [facility], NP SWAB IN [facility] TRANSPORT MEDIA 24-30 HR TAT - Swab, Nasopharynx  -     QUESTIONNAIRE SERIES    2. Cough  -     POC Influenza A / B  -     COVID-19 PCR, [facility], NP SWAB IN [facility]AL TRANSPORT MEDIA 24-30 HR TAT - Swab, Nasopharynx  -     QUESTIONNAIRE SERIES    3. Immunocompromised (CMS/HCC)  -     POC Influenza A / B  -     COVID-19 PCR, [facility], NP SWAB IN [facility]AL TRANSPORT MEDIA 24-30 HR TAT - Swab, Nasopharynx    4. History of splenectomy  -     POC Influenza A / B  -     COVID-19 PCR, [facility], NP SWAB IN [facility] TRANSPORT MEDIA 24-30 HR TAT - Swab, Nasopharynx      High suspicion for Covid-19 given possible exposure. She will come to the office this afternoon for Covid/ flu swabs. We discussed plan moving forward given her immunocompromised status and 30 weeks pregnant. Depending on course of sx, she may  need admission for further management if she is Covid positive, although we will await lab results before further planning. Pt with good understanding and in agreement with plan. May continue tylenol PRN.          This was an audio and video enabled telemedicine encounter. Total time 12 minutes.   This visit completed via [facility] video visit. The patient is in agreement with MyChart video visit and understands that a full physical exam cannot be completed via video visit, and chooses to proceed with video visit. He/She was instructed to RTC with new or worsening symptoms for face to face office visit if needed.      Return if symptoms worsen or fail to improve.

## 2021-02-13 ENCOUNTER — TELEPHONE (OUTPATIENT)
Dept: INTERNAL MEDICINE | Facility: CLINIC | Age: 26
End: 2021-02-13

## 2021-02-13 LAB — SARS-COV-2 RNA RESP QL NAA+PROBE: DETECTED

## 2021-02-13 NOTE — TELEPHONE ENCOUNTER
Hilda- please fill out positive Covid paperwork for health dept and fax.   Remainder of call for documentation purposes only.   ---     Spoke to pt, informed of positive Covid test. She is feeling better today. No fever, chills or body aches today which is improved. She was considering being evaluated at hospital due to 30 weeks pregnant and immunocompromised state (asplenic), but given improvement she is going to remain at home. Discussed when to seek emergency care, as well as quarantine protocol for herself and family members. Pt with good understanding and in agreement with plan.

## 2021-02-15 NOTE — TELEPHONE ENCOUNTER
COVID 19 Reportable disease form has been completed, and faxed to [place] [facility] @ [phone], confirmation fax recieved, placed in scan pile in front office.

## 2021-05-12 ENCOUNTER — TELEMEDICINE (OUTPATIENT)
Dept: INTERNAL MEDICINE | Facility: CLINIC | Age: 26
End: 2021-05-12

## 2021-05-12 DIAGNOSIS — F33.0 MILD EPISODE OF RECURRENT MAJOR DEPRESSIVE DISORDER (HCC): ICD-10-CM

## 2021-05-12 DIAGNOSIS — G89.29 CHRONIC BILATERAL LOW BACK PAIN WITHOUT SCIATICA: ICD-10-CM

## 2021-05-12 DIAGNOSIS — F41.9 ANXIETY: Primary | ICD-10-CM

## 2021-05-12 DIAGNOSIS — M54.50 CHRONIC BILATERAL LOW BACK PAIN WITHOUT SCIATICA: ICD-10-CM

## 2021-05-12 PROCEDURE — 99214 OFFICE O/P EST MOD 30 MIN: CPT | Performed by: INTERNAL MEDICINE

## 2021-05-12 RX ORDER — CLONAZEPAM 0.5 MG/1
TABLET ORAL
Qty: 45 TABLET | Refills: 1 | Status: SHIPPED | OUTPATIENT
Start: 2021-05-12 | End: 2021-07-01 | Stop reason: SDUPTHER

## 2021-05-12 RX ORDER — TRAMADOL HYDROCHLORIDE 50 MG/1
50 TABLET ORAL EVERY 6 HOURS PRN
Qty: 60 TABLET | Refills: 2 | Status: SHIPPED | OUTPATIENT
Start: 2021-05-12 | End: 2021-07-31 | Stop reason: SDUPTHER

## 2021-05-12 RX ORDER — ESCITALOPRAM OXALATE 10 MG/1
10 TABLET ORAL DAILY
Qty: 90 TABLET | Refills: 2 | Status: SHIPPED | OUTPATIENT
Start: 2021-05-12 | End: 2021-07-01 | Stop reason: SDUPTHER

## 2021-05-12 NOTE — PROGRESS NOTES
Subjective   Genesis Chapin is a 26 y.o. female.     History of Present Illness     The following portions of the patient's history were reviewed and updated as appropriate: allergies, current medications, past family history, past medical history, past social history, past surgical history and problem list.    Patient has consented for video MyChart    1 low back pain -chronic and worsening.  Patient says that she would like to go back on the tramadol because this helped her back pain.  Patient has had low back pain after each of her C-sections and she would like to go back on the tramadol because it did help control    2 anxiety-worsening.  Patient says that she was on the Lexapro 10 mg which did help with her depression but the hydroxyzine was not helping help curb the panic attacks.  Patient would like to go on another medication that would help control the daily panic attacks in general.    Review of Systems   All other systems reviewed and are negative.      Objective   Physical Exam  Vitals and nursing note reviewed.   Constitutional:       Appearance: Normal appearance. She is normal weight.   HENT:      Head: Normocephalic and atraumatic.      Nose: Nose normal.      Mouth/Throat:      Mouth: Mucous membranes are moist.   Eyes:      Extraocular Movements: Extraocular movements intact.      Conjunctiva/sclera: Conjunctivae normal.      Pupils: Pupils are equal, round, and reactive to light.   Cardiovascular:      Rate and Rhythm: Normal rate and regular rhythm.      Pulses: Normal pulses.      Heart sounds: Normal heart sounds.   Pulmonary:      Effort: Pulmonary effort is normal.      Breath sounds: Normal breath sounds.   Abdominal:      General: Bowel sounds are normal.      Palpations: Abdomen is soft.   Musculoskeletal:         General: Normal range of motion.      Cervical back: Normal range of motion and neck supple.   Skin:     General: Skin is warm.      Capillary Refill: Capillary refill takes  less than 2 seconds.   Neurological:      General: No focal deficit present.      Mental Status: She is alert.           Assessment/Plan   Diagnoses and all orders for this visit:    Spent 15 minutes face-to-face video Jewel    1. Anxiety (Primary)  (New start)  -     clonazePAM (KlonoPIN) 0.5 MG tablet; Take one tablet po BID  Dispense: 45 tablet; Refill: 1    Side effects and precautions of the new medication(s) have been discussed with patient  I have answered patients questions thoroughly to their understanding.  If patient should experience any side effects from the medication, a follow up appointment should be made.      2. Mild episode of recurrent major depressive disorder (CMS/HCC)  -     escitalopram (Lexapro) 10 MG tablet; Take 1 tablet by mouth Daily.  Dispense: 90 tablet; Refill: 2    3. Chronic bilateral low back pain without sciatica  -     traMADol (ULTRAM) 50 MG tablet; Take 1 tablet by mouth Every 6 (Six) Hours As Needed for Moderate Pain .  Dispense: 60 tablet; Refill: 2

## 2021-05-13 ENCOUNTER — PRIOR AUTHORIZATION (OUTPATIENT)
Dept: INTERNAL MEDICINE | Facility: CLINIC | Age: 26
End: 2021-05-13

## 2021-05-13 NOTE — TELEPHONE ENCOUNTER
PA created 5/13/2021 for traMADol (ULTRAM) 50 MG    Key: [id number]    Sent to insurance waiting for response.

## 2021-07-01 ENCOUNTER — LAB (OUTPATIENT)
Dept: INTERNAL MEDICINE | Facility: CLINIC | Age: 26
End: 2021-07-01

## 2021-07-01 ENCOUNTER — TELEMEDICINE (OUTPATIENT)
Dept: INTERNAL MEDICINE | Facility: CLINIC | Age: 26
End: 2021-07-01

## 2021-07-01 ENCOUNTER — TELEPHONE (OUTPATIENT)
Dept: INTERNAL MEDICINE | Facility: CLINIC | Age: 26
End: 2021-07-01

## 2021-07-01 DIAGNOSIS — Z79.899 HIGH RISK MEDICATION USE: ICD-10-CM

## 2021-07-01 DIAGNOSIS — F41.0 PANIC DISORDER: ICD-10-CM

## 2021-07-01 DIAGNOSIS — F32.A ANXIETY AND DEPRESSION: Primary | ICD-10-CM

## 2021-07-01 DIAGNOSIS — F41.9 ANXIETY AND DEPRESSION: Primary | ICD-10-CM

## 2021-07-01 PROCEDURE — 99213 OFFICE O/P EST LOW 20 MIN: CPT | Performed by: NURSE PRACTITIONER

## 2021-07-01 RX ORDER — ESCITALOPRAM OXALATE 10 MG/1
10 TABLET ORAL DAILY
Qty: 90 TABLET | Refills: 0 | Status: SHIPPED | OUTPATIENT
Start: 2021-07-01 | End: 2021-07-07 | Stop reason: SDUPTHER

## 2021-07-01 RX ORDER — CLONAZEPAM 0.5 MG/1
TABLET ORAL
Qty: 14 TABLET | Refills: 0 | Status: SHIPPED | OUTPATIENT
Start: 2021-07-01 | End: 2021-07-07 | Stop reason: SDUPTHER

## 2021-07-01 NOTE — TELEPHONE ENCOUNTER
Patient medication-clonazepam was not increased as the patient requested however kept at the same current dose and refilled for 1 week until UDS returned.  Patient's Lexapro sent to pharmacy for 90 days with message sent to  to follow-up and schedule appointment with PCP as she is due for follow-up anxiety and physical.

## 2021-07-01 NOTE — TELEPHONE ENCOUNTER
Patient came in to office and had the oral swab done. Before she left she had asked if you was going to go ahead and call in the medicine. Silvia was in the lab with me and had advised her that usually you don't get the medication til the drug screen comes back. Patient was really upset and said that she has been without her medication for a few days and needs her medication. We advised patient that we would send a message to you and give her a call back. Patient did also sign her CSA.

## 2021-07-01 NOTE — TELEPHONE ENCOUNTER
Patient needs CSA signed and oral swab for drug screen prior to prescription refills.  Patient is on her way to clinic today to have completed.  Please send message to me when patient has completed drug screen and CSA.

## 2021-07-01 NOTE — PROGRESS NOTES
Chief Complaint  No chief complaint on file.    Subjective          Genesis Chapin presents to [facility]  History of Present Illness  This was an audio and video enabled telemedicine encounter.  You have chosen to receive care through a telehealth visit.  Do you consent to use a video/audio connection for your medical care today? Yes    The patient was seen today by video visit.  Patient is new to me a patient of Dr. Hoffman.  The patient reports she has 4 children under the age of 5 she has had quite a bit of depression anxiety and panic disorder.  She is out of her medication and is in need of a visit.  Patient reports she would like an increase in her BZD clonazepam to 1 mg twice a day as this has been very helpful for her.  She is taking per her report Lexapro until she ran out and Ultram.  Patient does have with her today her family who is 18 helping her babysit you can drive for her as she was driving when she called me today.  I did ask her to pull over because she was very upset and anxious.  She did do so and switch drivers at that time.  The patient again denies any SI HI.     Objective   Vital Signs: Unable to check vital signs.  There were no vitals taken for this visit.    Physical Exam  Eyes:      General: No scleral icterus.  Pulmonary:      Effort: Pulmonary effort is normal.   Skin:     Coloration: Skin is not pale.   Neurological:      Mental Status: She is alert and oriented to person, place, and time.   Psychiatric:      Comments: Tearful rapid speech        Result Review :                 Assessment and Plan    Diagnoses and all orders for this visit:    1. Anxiety and depression (Primary)  -     Ambulatory Referral to Psychiatry  -     escitalopram (Lexapro) 10 MG tablet; Take 1 tablet by mouth Daily.  Dispense: 90 tablet; Refill: 0    2. Panic disorder  -     Ambulatory Referral to Psychiatry  -     clonazePAM (KlonoPIN) 0.5 MG tablet; Take  one tablet po BID  Dispense: 14 tablet; Refill: 0    3. High risk medication use  -     Drug Analysis,Comp,Oral Fluid - Saliva,; Future        16-minute visit    UDS CSA not up-to-date-updated.  Romeo appropriate per the medication provided by her PCP Dr. Hoffman.    Strongly encourage patient to find a safe time if she is having anxiety depression at home and I have asked her to go to the emergency room if her at any point her symptoms are exacerbating or not controlled with the medications prescribed today.  I have given her the information that I will prescribe her Xanax until her UDS is returned.  If her UDS is appropriate that I will prescribe her medication at the current dose until her PCP is in office at that point I have scheduled her back in to see him and she may talk with him about any increase in medications.    Follow Up   Return in about 2 months (around 9/1/2021), or if symptoms worsen or fail to improve, for Recheck [place], Annual, fasting.  Patient was given instructions and counseling regarding her condition or for health maintenance advice. Please see specific information pulled into the AVS if appropriate.     RTC/call  If symptoms worsen  Meds MOA and SE's reviewed and pt v/u

## 2021-07-07 DIAGNOSIS — F41.0 PANIC DISORDER: ICD-10-CM

## 2021-07-07 DIAGNOSIS — F41.9 ANXIETY AND DEPRESSION: ICD-10-CM

## 2021-07-07 DIAGNOSIS — F32.A ANXIETY AND DEPRESSION: ICD-10-CM

## 2021-07-07 RX ORDER — CLONAZEPAM 0.5 MG/1
TABLET ORAL
Qty: 60 TABLET | Refills: 0 | Status: SHIPPED | OUTPATIENT
Start: 2021-07-07 | End: 2021-08-04

## 2021-07-07 RX ORDER — CLONAZEPAM 0.5 MG/1
TABLET ORAL
Qty: 14 TABLET | Refills: 0 | Status: CANCELLED | OUTPATIENT
Start: 2021-07-07

## 2021-07-07 RX ORDER — ESCITALOPRAM OXALATE 10 MG/1
10 TABLET ORAL DAILY
Qty: 90 TABLET | Refills: 3 | Status: SHIPPED | OUTPATIENT
Start: 2021-07-07 | End: 2021-08-04

## 2021-07-08 ENCOUNTER — TELEPHONE (OUTPATIENT)
Dept: INTERNAL MEDICINE | Facility: CLINIC | Age: 26
End: 2021-07-08

## 2021-07-08 NOTE — TELEPHONE ENCOUNTER
Spoke to pt, her appointment has been moved sooner as requested 08/04/2021 @ 3:15. Good verbal understanding.

## 2021-07-08 NOTE — TELEPHONE ENCOUNTER
----- Message from Wesley Hoffman MD sent at 7/7/2021  6:53 PM EDT -----  Regarding: Video visit schedule  Please put patient on schedule for video visit on 8/4/21. Please call patient once appointment made to confirm.

## 2021-07-11 LAB
AMPHETAMINES SAL QL SCN: NEGATIVE NG/ML
CANNABINOIDS SAL QL SCN: NORMAL NG/ML
CARBOXYTHC SAL CFM-MCNC: 32 NG/ML
CARBOXYTHC SAL QL CFM: POSITIVE
COCAINE SAL QL SCN: NEGATIVE NG/ML
OPIATES SAL QL SCN: NEGATIVE NG/ML
PCP SAL QL SCN: NEGATIVE NG/ML

## 2021-07-14 ENCOUNTER — TELEPHONE (OUTPATIENT)
Dept: INTERNAL MEDICINE | Facility: CLINIC | Age: 26
End: 2021-07-14

## 2021-07-31 DIAGNOSIS — M54.50 CHRONIC BILATERAL LOW BACK PAIN WITHOUT SCIATICA: ICD-10-CM

## 2021-07-31 DIAGNOSIS — G89.29 CHRONIC BILATERAL LOW BACK PAIN WITHOUT SCIATICA: ICD-10-CM

## 2021-08-02 NOTE — TELEPHONE ENCOUNTER
Last OV for chronic conditions 5-12-21  Next OV 11-3-21  UDS 7-1-21-Positive for THC  CSA 7-1-21

## 2021-08-03 RX ORDER — TRAMADOL HYDROCHLORIDE 50 MG/1
50 TABLET ORAL EVERY 6 HOURS PRN
Qty: 60 TABLET | Refills: 2 | Status: SHIPPED | OUTPATIENT
Start: 2021-08-03 | End: 2021-10-19 | Stop reason: SDUPTHER

## 2021-08-04 ENCOUNTER — TELEMEDICINE (OUTPATIENT)
Dept: PSYCHIATRY | Facility: CLINIC | Age: 26
End: 2021-08-04

## 2021-08-04 DIAGNOSIS — G47.9 SLEEPING DIFFICULTIES: ICD-10-CM

## 2021-08-04 DIAGNOSIS — F41.0 PANIC DISORDER (EPISODIC PAROXYSMAL ANXIETY): ICD-10-CM

## 2021-08-04 DIAGNOSIS — F33.0 MILD EPISODE OF RECURRENT MAJOR DEPRESSIVE DISORDER (HCC): ICD-10-CM

## 2021-08-04 DIAGNOSIS — F41.1 GENERALIZED ANXIETY DISORDER: Primary | ICD-10-CM

## 2021-08-04 PROCEDURE — 90792 PSYCH DIAG EVAL W/MED SRVCS: CPT | Performed by: NURSE PRACTITIONER

## 2021-08-04 RX ORDER — PROPRANOLOL HYDROCHLORIDE 10 MG/1
10 TABLET ORAL 2 TIMES DAILY PRN
Qty: 60 TABLET | Refills: 0 | Status: SHIPPED | OUTPATIENT
Start: 2021-08-04 | End: 2022-04-20 | Stop reason: SDUPTHER

## 2021-08-04 RX ORDER — VENLAFAXINE HYDROCHLORIDE 37.5 MG/1
CAPSULE, EXTENDED RELEASE ORAL
Qty: 60 CAPSULE | Refills: 0 | Status: SHIPPED | OUTPATIENT
Start: 2021-08-04 | End: 2021-08-30 | Stop reason: SDUPTHER

## 2021-08-04 NOTE — PROGRESS NOTES
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 verbally consented with current treatment plan and was educated on the importance of compliance with treatment and follow-up appointments.        -Discontinue Klonopin 0.5 mg twice daily as needed for anxiety/panic per patient request.  Patient endorses that she does not want to continue taking a controlled substance due to potential long-term effects and due to her concurrent marijuana use.  Patient reports that she is been off this medication for approximately 5 days now.  Patient endorses that while she has had physical withdrawal symptoms she is now doing better and tolerating being off this medication better.  -Discontinue Lexapro 10 mg daily due to inefficacy  -Begin Effexor XR 37.5 mg daily x1 week then increase to 75 mg daily for depression and anxiety  -Begin propanolol 10 mg twice daily as needed for anxiety/panic  -Begin psychotherapy per patient's request  -Patient reports she has a history of ADHD which was diagnosed in early childhood.  Patient endorses that she thinks her ADHD symptoms contributed to a lot of depression and anxiety that she does experience.  Discussed with patient that it is difficult to determine definitive diagnoses at this time due to severity of other symptoms, including depression and anxiety.  Discussed with patient that we will target depression and  anxiety today with medication management and continue to monitor and evaluate for ADHD.  Discussed with patient that if focus and concentration not improve once anxiety is more controlled we will explore treatment options for ADHD at that time and patient is agreeable to this.      MEDICATION ISSUES: Discussed medication options and treatment plan of prescribed medication, any off label use of medication, as well as the risks, benefits, any black box warnings including increased suicidality, and side effects including but not limited to potential falls, dizziness, possible impaired driving, GI side effects (change in appetite, abdominal discomfort, nausea, vomiting, diarrhea, and/or constipation), dry mouth, somnolence, sedation, insomnia, activation, agitation, irritation, tremors, abnormal muscle movements or disorders, headache, sweating, possible bruising or rare bleeding, electrolyte and/or fluid abnormalities, change in blood pressure/heart rate/and or heart rhythm, sexual dysfunction, and metabolic adversities among others. Patient and/or guardian agreeable to call the office with any worsening of symptoms or onset of side effects, or if any concerns or questions arise.  The contact information for the office is made available to the patient and/or guardian.  Patient and/or guardian agreeable to call 911 or go to the nearest ER should they begin having any SI/HI, or if any urgent concerns arise. No medication side effects or related complaints today.          [facility] No Show Policy:  We understand unexpected circumstances arise; however, anytime you miss your appointment we are unable to provide you appropriate care.  In addition, each appointment missed could have been used to provide care for others.  We ask that you call at least 24 hours in advance to cancel or reschedule an appointment.  We would like to take this opportunity to remind you of our policy stating patients who  miss THREE or more appointments without cancelling or rescheduling 24 hours in advance of the appointment may be subject to cancellation of any further visits with our clinic and recommendation to seek in-person services/visits.    Please call [phone] to reschedule your appointment. If there are reasons that make it difficult for you to keep the appointments, please call and let us know how we can help.  Please understand that medication prescribing will not continue without seeing your provider.      [facility]'s No Show Policy reviewed with patient at today's visit. Patient verbalized understanding of this policy. Discussed with patient that in the event that there are three or more no show visits, it will be recommended that they pursue in-person services/visits as noncompliance with telehealth visits indicates that patient is not an appropriate candidate for telemedicine and would likely be more appropriate for in-person services/visits. Patient verbalizes understanding and is agreeable to this.      MEDS ORDERED DURING VISIT:  New Medications Ordered This Visit   Medications   • venlafaxine XR (EFFEXOR-XR) 37.5 MG 24 hr capsule     Sig: Take 1 capsule by mouth daily x1 week then increase to 2 capsules by mouth daily.     Dispense:  60 capsule     Refill:  0   • propranolol (INDERAL) 10 MG tablet     Sig: Take 1 tablet by mouth 2 (Two) Times a Day As Needed (anxiety/panic).     Dispense:  60 tablet     Refill:  0       Return in about 4 weeks (around 9/1/2021), or if symptoms worsen or fail to improve, for Recheck.        Patient will follow-up in 4 weeks, highly encouraged the patient if she had any questions or concerns to contact the behavioral health virtual clinic for sooner appointment patient verbalized understanding.      Functional Status: Moderate impairment     Prognosis: Guarded with Ongoing Treatment             This document has been electronically signed by No  LENA Ivan  August 4, 2021 12:10 EDT    Part of this note may be an electronic transcription/translation of spoken language to printed text using the Dragon Dictation System.

## 2021-08-11 ENCOUNTER — PRIOR AUTHORIZATION (OUTPATIENT)
Dept: INTERNAL MEDICINE | Facility: CLINIC | Age: 26
End: 2021-08-11

## 2021-08-11 NOTE — TELEPHONE ENCOUNTER
Genesis Chapin (Key: [id number])   Drug:traMADol HCl 50MG tablets  Your information has been sent to [facility].

## 2021-08-30 ENCOUNTER — TELEMEDICINE (OUTPATIENT)
Dept: PSYCHIATRY | Facility: CLINIC | Age: 26
End: 2021-08-30

## 2021-08-30 DIAGNOSIS — F41.1 GENERALIZED ANXIETY DISORDER: Primary | Chronic | ICD-10-CM

## 2021-08-30 DIAGNOSIS — R45.86 MOOD SWINGS: ICD-10-CM

## 2021-08-30 DIAGNOSIS — F39 UNSPECIFIED MOOD (AFFECTIVE) DISORDER (HCC): Chronic | ICD-10-CM

## 2021-08-30 DIAGNOSIS — F41.0 PANIC DISORDER (EPISODIC PAROXYSMAL ANXIETY): ICD-10-CM

## 2021-08-30 DIAGNOSIS — R45.4 IRRITABILITY: ICD-10-CM

## 2021-08-30 DIAGNOSIS — G47.9 SLEEPING DIFFICULTIES: ICD-10-CM

## 2021-08-30 PROCEDURE — 99214 OFFICE O/P EST MOD 30 MIN: CPT | Performed by: NURSE PRACTITIONER

## 2021-08-30 RX ORDER — VENLAFAXINE HYDROCHLORIDE 75 MG/1
75 CAPSULE, EXTENDED RELEASE ORAL DAILY
Qty: 30 CAPSULE | Refills: 0 | Status: SHIPPED | OUTPATIENT
Start: 2021-08-30 | End: 2021-09-22 | Stop reason: SDUPTHER

## 2021-08-30 RX ORDER — LAMOTRIGINE 25 MG/1
TABLET ORAL
Qty: 60 TABLET | Refills: 0 | Status: SHIPPED | OUTPATIENT
Start: 2021-08-30 | End: 2021-09-22 | Stop reason: SDUPTHER

## 2021-08-30 NOTE — PROGRESS NOTES
"This provider is located at the [facility] (through [facility]), [address], [facility], [address] using a secure [employer] Video Visit through [employer]. Patient is being seen remotely via telehealth at their home address in [place], and stated they are in a secure environment for this session. The patient's condition being diagnosed/treated is appropriate for telemedicine. The provider identified herself as well as her credentials. The patient, and/or patients guardian, consent to be seen remotely, and when consent is given they understand that the consent allows for patient identifiable information to be sent to a third party as needed. They may refuse to be seen remotely at any time. The electronic data is encrypted and password protected, and the patient and/or guardian has been advised of the potential risks to privacy not withstanding such measures.    You have chosen to receive care through a telehealth visit.  Do you consent to use a video/audio connection for your medical care today? Yes     Subjective   Genesis Chapin is a 26 y.o. female who is here today for medication management follow up.    Chief Complaint: depression, anxiety, panic attacks, sleeping difficulties    History of Present Illness: The patient describes her mood as \"okay-courtney\" over the last few weeks.  Patient states that overall she has noticed improvements in symptoms since beginning the Effexor.  Patient states that specifically she has noticed that she is \"more patient at times\".  Patient states that she does not experience this all the time and endorses that she can easily become irritated/frustrated, but endorses that overall she has noticed improvements in her \"patience\" and stress/anxiety.  Patient states that when she does get irritable, it is very difficult for her to control this.  Patient states that she found herself getting overwhelmed easily with her current situational stressors " "and states that this has been difficult for her.  Patient states for example she is currently dealing with the potential diagnoses of autism with her 2-year-old son and ADHD with her 5-year-old son.  Patient states that she frequently gets down on herself because she does not feel as if she is being the person she needs to be in order to help her children navigate these diagnoses.  Patient states that it is difficult for her to struggle with her own mental illnesses as well as help her children with theirs.  Patient states that she has a very good support system and her  is very supportive, but endorses that at times \"it just does not feel like enough\".  Patient states that whenever she gets very overwhelmed she is very critical of herself.  Patient states for example she has a lot of negative self talk and she will say these things out loud, even in front of her children.  Patient states that she has been implementing nonpharmacologic behavioral interventions for her children to help with the things that they are experiencing.  Patient states for example she has implemented a reward system for them.  Patient states that it has been difficult for her to implement this as she does not want to punish the negative behaviors and only wants to praise positive behaviors.  Patient states that she wants her children to feel happy all the time and states that it is difficult for her to enforce the consequences she set in the reward system because of how she does not want her children to feel negative emotions.  Discussed with patient that this is something she can also bring up with her therapist and discuss how she can better maintain the reward system and consequences without feeling guilty and upset with herself.  The patient reports her appetite as fair.  The patient reports her sleep as poor.  Patient states that unless she smokes marijuana heavily, she cannot sleep. Patient states that it takes her way too long " "to fall asleep unless she smokes THC. Patient states that whenever she cannot fall asleep she becomes panicked and this further causes her difficulty falling asleep. The patient denies any nightmares or bad dreams.  Patient states that she did notice side effects after beginning the Effexor.  Patient states that initially after beginning the medication and increasing to the 75 mg she did not experience nausea.  Patient states that the nausea has been improving as she has continued taking the medication.  Patient states that the nausea was not very severe and something that she feels as if she could deal with.  Patient states that she has noticed an increase in the headaches.  Patient states that the headaches have been more severe than the nausea.  Patient states that headache severity has not improved any as she has continued to take the medication, but endorses that they are not occurring as frequently as she has continued taking it.  Patient states that overall she really likes the Effexor and how it is doing for her and endorses that \"the good outweighs the bad right now\".  Discussed patient that we will continue to monitor and evaluate for side effects and discuss other treatment options if these side effects persist with continuation of the medication and patient is agreeable to this.  The patient denies any new medical problems or changes in medications since last appointment with this facility.  The patient reports compliance with current medication regimen.  The patient denies any current side effects from her current medication regimen.  The patient denies any abnormal muscle movements or tics.  The patient rates her depression at a 0/10 on a 0-10 scale, with 10 being the worst.  Patient states that she really has not felt depressed, but states that she has felt \"more emotional\".  Patient states that whenever she experiences an emotion she experiences it \"more intensely\".  Patient states for example when " "she feels sad, she feels as if she has been experiencing that emotion more intensely than she has in the past.  Patient states that she has not necessarily felt depressed, but endorses that whenever she does have periods of feeling sad, such as when she found out her grandparents have been sick, she felt this more intensely.  Patient also endorses a lot of black-and-white thought patterns.  Discussed with patient that this is something that can be targeted and worked on with her therapist when she establishes care with them next month.  The patient rates her anxiety at a 7/10 on a 0-10 scale, with 10 being the worst.  The patient denies suicidal ideations and homicidal ideations, and is convincing.  The patient denies any auditory hallucinations or visual hallucinations.  The patient does not endorse significant symptoms consistent with bipolar disorder or a psychotic illness.                LMP:  3 weeks ago. \"Sometime between August 6th and 11th\". States that she cannot recall the exact date, but states that she knows it was sometimes between these dates.  The patient denies any chance of pregnancy at this time.  Patient states that in the event that she becomes pregnant, she will not be keeping the pregnancy and would plan to abort the pregnancy.  The patient was educated that her prescribed medications can have potential risk to a developing fetus. The patient is advised to contact this APRN/this office if she becomes pregnant or plans to become pregnant.  Pt verbalizes understanding and acknowledged agreement with this plan in her own words.      The following portions of the patient's history were reviewed and updated as appropriate: allergies, current medications, past family history, past medical history, past social history, past surgical history and problem list.    Review of Systems   Constitutional: Negative for activity change, appetite change, fatigue and unexpected weight change. "   Psychiatric/Behavioral: Positive for agitation, decreased concentration, dysphoric mood and sleep disturbance. Negative for behavioral problems, confusion, hallucinations, self-injury and suicidal ideas. The patient is nervous/anxious. The patient is not hyperactive.        Objective   Physical Exam  Constitutional:       Appearance: Normal appearance.   Neurological:      Mental Status: She is alert.   Psychiatric:         Attention and Perception: Perception normal. She is inattentive.         Mood and Affect: Affect normal. Mood is anxious.         Speech: Speech normal.         Behavior: Behavior is agitated. Behavior is cooperative.         Thought Content: Thought content normal. Thought content does not include homicidal or suicidal ideation. Thought content does not include homicidal or suicidal plan.         Cognition and Memory: Cognition and memory normal.         Judgment: Judgment is impulsive.       not currently breastfeeding.    The patient was seen remotely today via a MyChart Video Visit through [employer].  Unable to obtain vital signs due to nature of remote visit.  Height stated at 67 inches.  Weight stated at 250 pounds.     Allergies   Allergen Reactions   • Amoxicillin Nausea And Vomiting   • Penicillins Nausea And Vomiting       Recent Results (from the past 2016 hour(s))   Drug Analysis,Comp,Oral Fluid - Saliva,    Collection Time: 07/01/21  2:44 PM    Specimen: Saliva    SALIVA  RELEASE TO PAT   Result Value Ref Range    Amphetamines, OF Negative Cutoff=50 ng/mL    Cocaine and Metabolites, OF Negative Cutoff=20 ng/mL    Marijuana (THC) OF See Final Results Cutoff=4 ng/mL    Opiates OF Negative Cutoff=40 ng/mL    Phencyclidine, OF Negative Cutoff=10 ng/mL   Marijuana (THC) Confirm,OF - ,    Collection Time: 07/01/21  2:44 PM    SALIVA  RELEASE TO PAT   Result Value Ref Range    Marijuana (THC), OF Positive (A) Cutoff=4    Marijuana (THC) Conf, OF 32 Cutoff=2 ng/mL       Current Medications:    Current Outpatient Medications   Medication Sig Dispense Refill   • propranolol (INDERAL) 10 MG tablet Take 1 tablet by mouth 2 (Two) Times a Day As Needed (anxiety/panic). 60 tablet 0   • traMADol (ULTRAM) 50 MG tablet Take 1 tablet by mouth Every 6 (Six) Hours As Needed for Moderate Pain . 60 tablet 2   • venlafaxine XR (EFFEXOR-XR) 75 MG 24 hr capsule Take 1 capsule by mouth Daily. 30 capsule 0   • lamoTRIgine (LaMICtal) 25 MG tablet Take 1 tablet by mouth daily x2 weeks then increase to 2 tablets by mouth daily. 60 tablet 0     No current facility-administered medications for this visit.       Mental Status Exam:   Hygiene:   good  Cooperation:  Cooperative  Eye Contact:  Good  Psychomotor Behavior:  Aggitated  Affect:  Full range  Hopelessness: Denies  Speech:  Normal  Thought Process:  Goal directed  Thought Content:  Normal  Suicidal:  None  Homicidal:  None  Hallucinations:  None  Delusion:  None  Memory:  Intact  Orientation:  Person, Place, Time and Situation  Reliability:  good  Insight:  Fair  Judgement:  Fair  Impulse Control:  Fair  Physical/Medical Issues:  Yes See medical history    PHQ-9 Depression Screening  Little interest or pleasure in doing things? (P) 1   Feeling down, depressed, or hopeless? (P) 0   Trouble falling or staying asleep, or sleeping too much? (P) 1   Feeling tired or having little energy? (P) 1   Poor appetite or overeating? (P) 0   Feeling bad about yourself - or that you are a failure or have let yourself or your family down? (P) 0   Trouble concentrating on things, such as reading the newspaper or watching television? (P) 0   Moving or speaking so slowly that other people could have noticed? Or the opposite - being so fidgety or restless that you have been moving around a lot more than usual? (P) 0   Thoughts that you would be better off dead, or of hurting yourself in some way? (P) 0   PHQ-9 Total Score (P) 3   If you checked off any problems, how difficult have these  problems made it for you to do your work, take care of things at home, or get along with other people? (P) Not difficult at all        PHQ-Score Total:  PHQ-9 Total Score: (P) 3    JUANCARLOS-7  Feeling nervous, anxious or on edge: (P) Several days  Not being able to stop or control worrying: (P) Several days  Worrying too much about different things: (P) Several days  Trouble Relaxing: (P) Several days  Being so restless that it is hard to sit still: (P) Several days  Feeling afraid as if something awful might happen: (P) Not at all  Becoming easily annoyed or irritable: (P) Several days  JUANCARLOS 7 Total Score: (P) 6  If you checked any problems, how difficult have these problems made it for you to do your work, take care of things at home, or get along with other people: (P) Somewhat difficult       PROMIS scale screening tool that patient filled out virtually reviewed by this APRN at today's encounter.     Assessment/Plan   Diagnoses and all orders for this visit:    1. Generalized anxiety disorder (Primary)  -     venlafaxine XR (EFFEXOR-XR) 75 MG 24 hr capsule; Take 1 capsule by mouth Daily.  Dispense: 30 capsule; Refill: 0    2. Panic disorder (episodic paroxysmal anxiety)  -     venlafaxine XR (EFFEXOR-XR) 75 MG 24 hr capsule; Take 1 capsule by mouth Daily.  Dispense: 30 capsule; Refill: 0    3. Unspecified mood (affective) disorder (CMS/HCC)  -     venlafaxine XR (EFFEXOR-XR) 75 MG 24 hr capsule; Take 1 capsule by mouth Daily.  Dispense: 30 capsule; Refill: 0  -     lamoTRIgine (LaMICtal) 25 MG tablet; Take 1 tablet by mouth daily x2 weeks then increase to 2 tablets by mouth daily.  Dispense: 60 tablet; Refill: 0    4. Sleeping difficulties    5. Irritability  -     lamoTRIgine (LaMICtal) 25 MG tablet; Take 1 tablet by mouth daily x2 weeks then increase to 2 tablets by mouth daily.  Dispense: 60 tablet; Refill: 0    6. Mood swings  -     lamoTRIgine (LaMICtal) 25 MG tablet; Take 1 tablet by mouth daily x2 weeks then  increase to 2 tablets by mouth daily.  Dispense: 60 tablet; Refill: 0            Visit Diagnoses:    ICD-10-CM ICD-9-CM   1. Generalized anxiety disorder  F41.1 300.02   2. Panic disorder (episodic paroxysmal anxiety)  F41.0 300.01   3. Unspecified mood (affective) disorder (CMS/[facility])  F39 296.90   4. Sleeping difficulties  G47.9 780.50   5. Irritability  R45.4 799.22   6. Mood swings  R45.86 799.24       GOALS:  Short Term Goals: Patient will be compliant with medication, and patient will have no significant medication related side effects.  Patient will be engaged in psychotherapy as indicated.  Patient will report subjective improvement of symptoms.  Long term goals: To stabilize mood and treat/improve subjective symptoms, the patient will stay out of the hospital, the patient will be at an optimal level of functioning, and the patient will take all medications as prescribed.  The patient verbalized understanding and agreement with goals that were mutually set.      SUICIDE RISK ASSESSMENT: Unalterable demographics and a history of mental health intervention indicate this patient is in a high risk category compared to the general population. At present, the patient denies active SI/HI, intentions, or plans at this time and agrees to seek immediate care should such thoughts develop. The patient verbalizes understanding of how to access emergency care if needed and agrees to do so. Consideration of suicide risk and protective factors such as history, current presentation, individual strengths and weaknesses, psychosocial and environmental stressors and variables, psychiatric illness and symptoms, medical conditions and pain, took place in this interview. Based on those considerations, the patient is determined: within individual baseline and presenting no imminent risk for suicide or homicide. Other recommendations: The patient does not meet the criteria for inpatient admission and is not a safety risk to self or  others at today's visit. Inpatient treatment offers no significant advantages over outpatient treatment for this patient at today's visit.      SAFETY PLAN:  Patient was given ample time for questions and fully participated in treatment planning.  Patient was encouraged to call the clinic with any questions or concerns.  Patient was informed of access to emergency care. If patient were to develop any significant symptomatology, suicidal ideation, homicidal ideation, any concerns, or feel unsafe at any time they are to call the clinic and if unable to get immediate assistance should immediately call 911 or go to the nearest emergency room.  The patient is advised to remove or secure (lock away) all lethal weapons (including guns) and sharps (including razors, scissors, knives, etc.).  All medications (including any prescribed and any over the counter medications) should be stored in a safe and secured location that is not obtainable by children/adolescents.  Patient was given an opportunity and encouraged to ask questions about their medication, illness, and treatment. Patient contracted verbally for the following: If you are experiencing an emotional crisis or have thoughts of harming yourself or others, please go to your nearest local emergency room or call 911. Will continue to re-assess medication response and side effects frequently to establish efficacy and ensure safety. Risks, any black box warnings, side effects, off label usage, and benefits of medication and treatment discussed with patient, along with potential adverse side effects of current and/or newly prescribed medication, alternative treatment options, and OTC medications.  Patient verbalized understanding of potential risks, any off label use of medication, any black box warnings, and any side effects in their own words. The patient verbalized understanding and agreed to comply with the safety plan discussed in their own words.  Patient given the  number to the office. Number also available to the 24- hour suicide hotline.      TREATMENT PLAN/GOALS: Continue medications and treatment plan as indicated. Treatment and medication options discussed during today's visit. Patient acknowledged and verbally consented to continue with current treatment plan and was educated on the importance of compliance with treatment and follow-up appointments.      -Continue Effexor XR 75 mg daily for depression and anxiety  -Continue propanolol 10 mg twice daily as needed for anxiety/panic  -Begin Lamictal 25 mg daily x2 weeks then increase to 50 mg daily for mood stability and irritability  -Begin psychotherapy. Scheduled to begin therapy in September.   -Rule out bipolar/mood disorder      MEDICATION ISSUES: Discussed medication options and treatment plan of prescribed medication, any off label use of medication, as well as the risks, benefits, any black box warnings including increased suicidality, and side effects including but not limited to potential falls, dizziness, possible impaired driving, GI side effects (change in appetite, abdominal discomfort, nausea, vomiting, diarrhea, and/or constipation), dry mouth, somnolence, sedation, insomnia, activation, agitation, irritation, tremors, abnormal muscle movements or disorders, headache, sweating, possible bruising or rare bleeding, electrolyte and/or fluid abnormalities, change in blood pressure/heart rate/and or heart rhythm, sexual dysfunction, and metabolic adversities among others. Patient and/or guardian agreeable to call the office with any worsening of symptoms or onset of side effects, or if any concerns or questions arise.  The contact information for the office is made available to the patient and/or guardian.  Patient and/or guardian agreeable to call 911 or go to the nearest ER should they begin having any SI/HI, or if any urgent concerns arise. No medication side effects or related complaints today.    This APRN  has discussed the benefits and risks of starting Lamictal (Lamotrigine).  The side effects of Lamictal can include a benign rash, blurred or double vision, dizziness, ataxia, sedation, headache, tremor, insomnia, poor coordination, fatigue,  nausea, vomiting, dyspepsia, rhinitis, infection, pharyngitis, asthenia, a rare but serious rash, rare multi-organ failure associated with Torres-Sander Syndrome, toxic epidermal necrolysis, drug hypersensitivity syndrome, rare blood dyscrasias, rare aseptic meningitis, rare sudden unexplained deaths in people with epilepsy, withdrawal seizures upon abrupt withdrawal, and rare activation of suicidal ideation and behavior (suicidality).  This APRN has discussed with the patient that a very slow dose titration when starting Lamictal may reduce the incidence of skin rash and other side effects.  The dosage should not be titrated upwards or increased faster than recommended due to the possibility of the discussed side effects and risk of development of a skin rash (which can become life threatening).      This APRN has also discussed with the patient that if the patient stops taking the Lamictal for 5 days or longer, it will be necessary to restart the drug with the initial dose titration, as rashes have been reported on reexposure.    This APRN has also discussed with the patient that the patient should avoid new medications, foods, or products during the first 3 months of Lamictal treatment in order to decrease the risk of unrelated rash.  The patient should also not start Lamictal within 2 weeks of a viral infection, a rash, or a vaccination.  Also, if the patient and Provider decide to stop the Lamictal, the patient will follow the directions of this APRN/this office as a guided taper over about two weeks is appropriate due to the risk of relapse in bipolar disorder, the risk of seizures in those with epilepsy, and discontinuation symptoms upon rapid discontinuation of  Lamictal.    The patient verbalizes understanding of benefits and risks as discussed, the patient feels the benefits outweigh the risks and is agreeable to start Lamictal via a slow titration schedule as discussed.  The patient is advised should any side effects or rash develops they are to stop the Lamictal immediately and contact this APRN/this office or go to the emergency department immediately.  The patient verbalizes understanding and agreement with treatment plan in their own words.      MEDS ORDERED DURING VISIT:  New Medications Ordered This Visit   Medications   • venlafaxine XR (EFFEXOR-XR) 75 MG 24 hr capsule     Sig: Take 1 capsule by mouth Daily.     Dispense:  30 capsule     Refill:  0   • lamoTRIgine (LaMICtal) 25 MG tablet     Sig: Take 1 tablet by mouth daily x2 weeks then increase to 2 tablets by mouth daily.     Dispense:  60 tablet     Refill:  0       Return in about 4 weeks (around 9/27/2021), or if symptoms worsen or fail to improve, for Recheck.        Patient will follow-up in 4 weeks, highly encouraged the patient if she had any questions or concerns to contact the behavioral health [facility] clinic for sooner appointment patient verbalized understanding.      Functional Status: Moderate impairment     Prognosis: Fair with Ongoing Treatment             This document has been electronically signed by LENA Barajas  August 30, 2021 12:13 EDT    Part of this note may be an electronic transcription/translation of spoken language to printed text using the Dragon Dictation System.

## 2021-09-22 ENCOUNTER — TELEMEDICINE (OUTPATIENT)
Dept: PSYCHIATRY | Facility: CLINIC | Age: 26
End: 2021-09-22

## 2021-09-22 DIAGNOSIS — F41.1 GENERALIZED ANXIETY DISORDER: Primary | Chronic | ICD-10-CM

## 2021-09-22 DIAGNOSIS — R45.4 IRRITABILITY: ICD-10-CM

## 2021-09-22 DIAGNOSIS — F39 UNSPECIFIED MOOD (AFFECTIVE) DISORDER (HCC): Chronic | ICD-10-CM

## 2021-09-22 DIAGNOSIS — R45.86 MOOD SWINGS: ICD-10-CM

## 2021-09-22 DIAGNOSIS — G47.9 SLEEPING DIFFICULTIES: ICD-10-CM

## 2021-09-22 DIAGNOSIS — F41.0 PANIC DISORDER (EPISODIC PAROXYSMAL ANXIETY): ICD-10-CM

## 2021-09-22 PROCEDURE — 99214 OFFICE O/P EST MOD 30 MIN: CPT | Performed by: NURSE PRACTITIONER

## 2021-09-22 RX ORDER — VENLAFAXINE HYDROCHLORIDE 150 MG/1
150 CAPSULE, EXTENDED RELEASE ORAL DAILY
Qty: 30 CAPSULE | Refills: 0 | Status: SHIPPED | OUTPATIENT
Start: 2021-09-22 | End: 2021-10-19 | Stop reason: SDUPTHER

## 2021-09-22 RX ORDER — LAMOTRIGINE 25 MG/1
50 TABLET ORAL DAILY
Qty: 60 TABLET | Refills: 0 | Status: SHIPPED | OUTPATIENT
Start: 2021-09-22 | End: 2021-10-19 | Stop reason: SDUPTHER

## 2021-09-22 NOTE — PROGRESS NOTES
"This provider is located at the [facility] (through [facility]), [address], [place], [address] using a secure [employer] Video Visit through [employer]. Patient is being seen remotely via telehealth at their home address in [place], and stated they are in a secure environment for this session. The patient's condition being diagnosed/treated is appropriate for telemedicine. The provider identified herself as well as her credentials. The patient, and/or patients guardian, consent to be seen remotely, and when consent is given they understand that the consent allows for patient identifiable information to be sent to a third party as needed. They may refuse to be seen remotely at any time. The electronic data is encrypted and password protected, and the patient and/or guardian has been advised of the potential risks to privacy not withstanding such measures.    You have chosen to receive care through a telehealth visit.  Do you consent to use a video/audio connection for your medical care today? Yes     Subjective   Genesis Chapin is a 26 y.o. female who is here today for medication management follow up.    Chief Complaint: depression, anxiety, panic attacks, sleeping difficulties    History of Present Illness: The patient describes her mood as \"not good\" over the last few weeks.  Patient states that she has not been feeling much better over the past few weeks.  Patient states that initially she felt as if her medications were effective for her anxiety, but endorses that she has noticed any further improvement since beginning the Lamictal.  Patient states that she still finding herself getting extremely overwhelmed and frustrated.  Patient states that she is still experiencing a lot of agitation and mood swings.  Patient endorses that she feels as if this is a result of sensory issues.  Patient states that whenever she becomes overstimulated she panics and \"shuts down\".  " Patient states that whenever she panics she feels very upset with herself and experiences a lot of very negative self talk.  Patient states that she has been very discouraged with lack of improvement in symptoms recently.  Patient states that she is very dependent upon others and does not feel as if she can function appropriately due to her difficulties with becoming so overwhelmed so easily.  The patient reports her appetite as fair.  The patient reports her sleep as good.  Patient states that she has been sleeping much better since beginning the Lamictal.  Patient states that she attempted to begin the medication in the morning, but endorses that she cannot tolerate it due to making her very drowsy and switch to taking it at nighttime.  Patient states that after switching to taking and at nighttime she has been sleeping much better.  The patient denies any nightmares or bad dreams.  The patient denies any new medical problems or changes in medications since last appointment with this facility.  The patient reports compliance with current medication regimen.  The patient denies any current side effects from her current medication regimen.  The patient denies any abnormal muscle movements or tics.  The patient rates her depression at a 2/10 on a 0-10 scale, with 10 being the worst.  The patient rates her anxiety at a 10/10 on a 0-10 scale, with 10 being the worst.  The patient rates hopelessness at a 0/10 on a 0-10 scale with 10 being the worst.  The patient denies suicidal ideations and homicidal ideations, and is convincing.  The patient denies any auditory hallucinations or visual hallucinations.  The patient does not endorse significant symptoms consistent with a psychotic illness.              LMP:  1 week ago.  The patient denies any chance of pregnancy at this time.  The patient was educated that her prescribed medications can have potential risk to a developing fetus. The patient is advised to contact this  APRN/this office if she becomes pregnant or plans to become pregnant.  Pt verbalizes understanding and acknowledged agreement with this plan in her own words.        The following portions of the patient's history were reviewed and updated as appropriate: allergies, current medications, past family history, past medical history, past social history, past surgical history and problem list.    Review of Systems   Constitutional: Positive for activity change and fatigue. Negative for appetite change and unexpected weight change.   Psychiatric/Behavioral: Positive for agitation, decreased concentration and dysphoric mood. Negative for behavioral problems, confusion, hallucinations, self-injury, sleep disturbance and suicidal ideas. The patient is nervous/anxious. The patient is not hyperactive.        Objective   Physical Exam  Constitutional:       Appearance: Normal appearance.   Neurological:      Mental Status: She is alert.   Psychiatric:         Attention and Perception: Perception normal. She is inattentive.         Mood and Affect: Affect normal. Mood is anxious.         Speech: Speech normal.         Behavior: Behavior is agitated. Behavior is cooperative.         Thought Content: Thought content normal. Thought content does not include homicidal or suicidal ideation. Thought content does not include homicidal or suicidal plan.         Cognition and Memory: Cognition and memory normal.         Judgment: Judgment is impulsive.       not currently breastfeeding.    The patient was seen remotely today via a MyChart Video Visit through Psychiatric.  Unable to obtain vital signs due to nature of remote visit.  Height stated at 67 inches.  Weight stated at 250 pounds.     Allergies   Allergen Reactions   • Amoxicillin Nausea And Vomiting   • Penicillins Nausea And Vomiting       Recent Results (from the past 2016 hour(s))   Drug Analysis,Comp,Oral Fluid - Saliva,    Collection Time: 07/01/21  2:44 PM    Specimen: Saliva     SALIVA  RELEASE TO PAT   Result Value Ref Range    Amphetamines, OF Negative Cutoff=50 ng/mL    Cocaine and Metabolites, OF Negative Cutoff=20 ng/mL    Marijuana (THC) OF See Final Results Cutoff=4 ng/mL    Opiates OF Negative Cutoff=40 ng/mL    Phencyclidine, OF Negative Cutoff=10 ng/mL   Marijuana (THC) Confirm,OF - ,    Collection Time: 07/01/21  2:44 PM    SALIVA  RELEASE TO PAT   Result Value Ref Range    Marijuana (THC), OF Positive (A) Cutoff=4    Marijuana (THC) Conf, OF 32 Cutoff=2 ng/mL       Current Medications:   Current Outpatient Medications   Medication Sig Dispense Refill   • lamoTRIgine (LaMICtal) 25 MG tablet Take 2 tablets by mouth Daily. 60 tablet 0   • propranolol (INDERAL) 10 MG tablet Take 1 tablet by mouth 2 (Two) Times a Day As Needed (anxiety/panic). 60 tablet 0   • traMADol (ULTRAM) 50 MG tablet Take 1 tablet by mouth Every 6 (Six) Hours As Needed for Moderate Pain . 60 tablet 2   • venlafaxine XR (EFFEXOR-XR) 150 MG 24 hr capsule Take 1 capsule by mouth Daily. 30 capsule 0     No current facility-administered medications for this visit.       Mental Status Exam:   Hygiene:   good  Cooperation:  Cooperative  Eye Contact:  Good  Psychomotor Behavior:  Aggitated  Affect:  Full range  Hopelessness: Denies  Speech:  Normal  Thought Process:  Goal directed  Thought Content:  Normal  Suicidal:  None  Homicidal:  None  Hallucinations:  None  Delusion:  None  Memory:  Intact  Orientation:  Person, Place, Time and Situation  Reliability:  good  Insight:  Fair  Judgement:  Fair  Impulse Control:  Fair  Physical/Medical Issues:  Yes See medical history    PHQ-9 Depression Screening  Little interest or pleasure in doing things? (P) 1   Feeling down, depressed, or hopeless? (P) 0   Trouble falling or staying asleep, or sleeping too much? (P) 1   Feeling tired or having little energy? (P) 3   Poor appetite or overeating? (P) 3   Feeling bad about yourself - or that you are a failure or have let yourself  or your family down? (P) 3   Trouble concentrating on things, such as reading the newspaper or watching television? (P) 3   Moving or speaking so slowly that other people could have noticed? Or the opposite - being so fidgety or restless that you have been moving around a lot more than usual? (P) 3   Thoughts that you would be better off dead, or of hurting yourself in some way? (P) 0   PHQ-9 Total Score (P) 17   If you checked off any problems, how difficult have these problems made it for you to do your work, take care of things at home, or get along with other people? (P) Extremely dIfficult        PHQ-Score Total:  PHQ-9 Total Score: (P) 17    JUANCARLOS-7  Feeling nervous, anxious or on edge: (P) Nearly every day  Not being able to stop or control worrying: (P) Nearly every day  Worrying too much about different things: (P) Nearly every day  Trouble Relaxing: (P) Nearly every day  Being so restless that it is hard to sit still: (P) Nearly every day  Feeling afraid as if something awful might happen: (P) Nearly every day  Becoming easily annoyed or irritable: (P) Nearly every day  JUANCARLOS 7 Total Score: (P) 21  If you checked any problems, how difficult have these problems made it for you to do your work, take care of things at home, or get along with other people: (P) Extremely difficult       PROMIS scale screening tool that patient filled out virtually reviewed by this APRN at today's encounter.     Assessment/Plan   Diagnoses and all orders for this visit:    1. Generalized anxiety disorder (Primary)  -     venlafaxine XR (EFFEXOR-XR) 150 MG 24 hr capsule; Take 1 capsule by mouth Daily.  Dispense: 30 capsule; Refill: 0    2. Panic disorder (episodic paroxysmal anxiety)  -     venlafaxine XR (EFFEXOR-XR) 150 MG 24 hr capsule; Take 1 capsule by mouth Daily.  Dispense: 30 capsule; Refill: 0    3. Unspecified mood (affective) disorder (HCC)  -     lamoTRIgine (LaMICtal) 25 MG tablet; Take 2 tablets by mouth Daily.   Dispense: 60 tablet; Refill: 0  -     venlafaxine XR (EFFEXOR-XR) 150 MG 24 hr capsule; Take 1 capsule by mouth Daily.  Dispense: 30 capsule; Refill: 0    4. Sleeping difficulties    5. Irritability  -     lamoTRIgine (LaMICtal) 25 MG tablet; Take 2 tablets by mouth Daily.  Dispense: 60 tablet; Refill: 0    6. Mood swings  -     lamoTRIgine (LaMICtal) 25 MG tablet; Take 2 tablets by mouth Daily.  Dispense: 60 tablet; Refill: 0            Visit Diagnoses:    ICD-10-CM ICD-9-CM   1. Generalized anxiety disorder  F41.1 300.02   2. Panic disorder (episodic paroxysmal anxiety)  F41.0 300.01   3. Unspecified mood (affective) disorder ([facility])  F39 296.90   4. Sleeping difficulties  G47.9 780.50   5. Irritability  R45.4 799.22   6. Mood swings  R45.86 799.24       GOALS:  Short Term Goals: Patient will be compliant with medication, and patient will have no significant medication related side effects.  Patient will be engaged in psychotherapy as indicated.  Patient will report subjective improvement of symptoms.  Long term goals: To stabilize mood and treat/improve subjective symptoms, the patient will stay out of the hospital, the patient will be at an optimal level of functioning, and the patient will take all medications as prescribed.  The patient verbalized understanding and agreement with goals that were mutually set.      SUICIDE RISK ASSESSMENT: Unalterable demographics and a history of mental health intervention indicate this patient is in a high risk category compared to the general population. At present, the patient denies active SI/HI, intentions, or plans at this time and agrees to seek immediate care should such thoughts develop. The patient verbalizes understanding of how to access emergency care if needed and agrees to do so. Consideration of suicide risk and protective factors such as history, current presentation, individual strengths and weaknesses, psychosocial and environmental stressors and variables,  psychiatric illness and symptoms, medical conditions and pain, took place in this interview. Based on those considerations, the patient is determined: within individual baseline and presenting no imminent risk for suicide or homicide. Other recommendations: The patient does not meet the criteria for inpatient admission and is not a safety risk to self or others at today's visit. Inpatient treatment offers no significant advantages over outpatient treatment for this patient at today's visit.      SAFETY PLAN:  Patient was given ample time for questions and fully participated in treatment planning.  Patient was encouraged to call the clinic with any questions or concerns.  Patient was informed of access to emergency care. If patient were to develop any significant symptomatology, suicidal ideation, homicidal ideation, any concerns, or feel unsafe at any time they are to call the clinic and if unable to get immediate assistance should immediately call 911 or go to the nearest emergency room.  The patient is advised to remove or secure (lock away) all lethal weapons (including guns) and sharps (including razors, scissors, knives, etc.).  All medications (including any prescribed and any over the counter medications) should be stored in a safe and secured location that is not obtainable by children/adolescents.  Patient was given an opportunity and encouraged to ask questions about their medication, illness, and treatment. Patient contracted verbally for the following: If you are experiencing an emotional crisis or have thoughts of harming yourself or others, please go to your nearest local emergency room or call 911. Will continue to re-assess medication response and side effects frequently to establish efficacy and ensure safety. Risks, any black box warnings, side effects, off label usage, and benefits of medication and treatment discussed with patient, along with potential adverse side effects of current and/or newly  prescribed medication, alternative treatment options, and OTC medications.  Patient verbalized understanding of potential risks, any off label use of medication, any black box warnings, and any side effects in their own words. The patient verbalized understanding and agreed to comply with the safety plan discussed in their own words.  Patient given the number to the office. Number also available to the 24- hour suicide hotline.      TREATMENT PLAN/GOALS: Continue medications and treatment plan as indicated. Treatment and medication options discussed during today's visit. Patient acknowledged and verbally consented to continue with current treatment plan and was educated on the importance of compliance with treatment and follow-up appointments.      -Increase Effexor XR to 150 mg daily for depression and anxiety  -Continue Lamictal 50 mg daily for mood stability and irritability  -Continue propanolol 10 mg twice daily as needed for anxiety/panic  -Begin psychotherapy  -Rule out bipolar/mood disorder      MEDICATION ISSUES: Discussed medication options and treatment plan of prescribed medication, any off label use of medication, as well as the risks, benefits, any black box warnings including increased suicidality, and side effects including but not limited to potential falls, dizziness, possible impaired driving, GI side effects (change in appetite, abdominal discomfort, nausea, vomiting, diarrhea, and/or constipation), dry mouth, somnolence, sedation, insomnia, activation, agitation, irritation, tremors, abnormal muscle movements or disorders, headache, sweating, possible bruising or rare bleeding, electrolyte and/or fluid abnormalities, change in blood pressure/heart rate/and or heart rhythm, sexual dysfunction, and metabolic adversities among others. Patient and/or guardian agreeable to call the office with any worsening of symptoms or onset of side effects, or if any concerns or questions arise.  The contact  information for the office is made available to the patient and/or guardian.  Patient and/or guardian agreeable to call 911 or go to the nearest ER should they begin having any SI/HI, or if any urgent concerns arise. No medication side effects or related complaints today.    This APRN has discussed the benefits and risks of starting Lamictal (Lamotrigine).  The side effects of Lamictal can include a benign rash, blurred or double vision, dizziness, ataxia, sedation, headache, tremor, insomnia, poor coordination, fatigue,  nausea, vomiting, dyspepsia, rhinitis, infection, pharyngitis, asthenia, a rare but serious rash, rare multi-organ failure associated with Torres-Sander Syndrome, toxic epidermal necrolysis, drug hypersensitivity syndrome, rare blood dyscrasias, rare aseptic meningitis, rare sudden unexplained deaths in people with epilepsy, withdrawal seizures upon abrupt withdrawal, and rare activation of suicidal ideation and behavior (suicidality).  This APRN has discussed with the patient that a very slow dose titration when starting Lamictal may reduce the incidence of skin rash and other side effects.  The dosage should not be titrated upwards or increased faster than recommended due to the possibility of the discussed side effects and risk of development of a skin rash (which can become life threatening).      This APRN has also discussed with the patient that if the patient stops taking the Lamictal for 5 days or longer, it will be necessary to restart the drug with the initial dose titration, as rashes have been reported on reexposure.    This APRN has also discussed with the patient that the patient should avoid new medications, foods, or products during the first 3 months of Lamictal treatment in order to decrease the risk of unrelated rash.  The patient should also not start Lamictal within 2 weeks of a viral infection, a rash, or a vaccination.  Also, if the patient and Provider decide to stop the  Lamictal, the patient will follow the directions of this APRN/this office as a guided taper over about two weeks is appropriate due to the risk of relapse in bipolar disorder, the risk of seizures in those with epilepsy, and discontinuation symptoms upon rapid discontinuation of Lamictal.    The patient verbalizes understanding of benefits and risks as discussed, the patient feels the benefits outweigh the risks and is agreeable to start Lamictal via a slow titration schedule as discussed.  The patient is advised should any side effects or rash develops they are to stop the Lamictal immediately and contact this APRN/this office or go to the emergency department immediately.  The patient verbalizes understanding and agreement with treatment plan in their own words.      MEDS ORDERED DURING VISIT:  New Medications Ordered This Visit   Medications   • lamoTRIgine (LaMICtal) 25 MG tablet     Sig: Take 2 tablets by mouth Daily.     Dispense:  60 tablet     Refill:  0   • venlafaxine XR (EFFEXOR-XR) 150 MG 24 hr capsule     Sig: Take 1 capsule by mouth Daily.     Dispense:  30 capsule     Refill:  0       Return in about 4 weeks (around 10/20/2021), or if symptoms worsen or fail to improve, for Recheck.        Patient will follow-up in 4 weeks, highly encouraged the patient if she had any questions or concerns to contact the behavioral health virtual clinic for sooner appointment patient verbalized understanding.      Functional Status: Moderate impairment     Prognosis: Guarded with Ongoing Treatment            This document has been electronically signed by LENA Barajas  September 22, 2021 08:29 EDT    Part of this note may be an electronic transcription/translation of spoken language to printed text using the [employer] System.

## 2021-09-24 ENCOUNTER — TELEMEDICINE (OUTPATIENT)
Dept: PSYCHIATRY | Facility: CLINIC | Age: 26
End: 2021-09-24

## 2021-09-24 DIAGNOSIS — F41.1 GENERALIZED ANXIETY DISORDER: Primary | ICD-10-CM

## 2021-09-24 PROCEDURE — 90791 PSYCH DIAGNOSTIC EVALUATION: CPT | Performed by: SOCIAL WORKER
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
## 2021-10-15 ENCOUNTER — TELEMEDICINE (OUTPATIENT)
Dept: PSYCHIATRY | Facility: CLINIC | Age: 26
End: 2021-10-15

## 2021-10-15 DIAGNOSIS — F39 UNSPECIFIED MOOD (AFFECTIVE) DISORDER (HCC): Primary | ICD-10-CM

## 2021-10-15 PROCEDURE — 90834 PSYTX W PT 45 MINUTES: CPT | Performed by: SOCIAL WORKER

## 2021-10-15 NOTE — PROGRESS NOTES
[facility]   Follow-up Progress Note     Date: October 24, 2021  Time In: 11:02am  Time Out: 11:50am    PROGRESS NOTE  Data:  Genesis Chapin is a 26 y.o. female presenting to [facility] for follow-up with Kayla Frank LCSW. The patient is seen remotely using [facility] My Chart. Patient is being seen via telehealth and stated they are in a secure environment for this session. The patient's condition being diagnosed/treated is appropriate for telemedicine. The provider identified herself as well as her credentials. The patient and/or patients guardian consent to be seen remotely, and when consent is given they understand that the consent allows for patient identifiable information to be sent to a third party as needed. They may refuse to be seen remotely at any time. The electronic data is encrypted and password protected, and the patient has been advised of the potential risks to privacy not withstanding such measures.    Today Patient checked in with how she has been.  She stated she has been hanging in there but has had a lot of stress with her children being sick.  She stated she has not been able to take some of her medications because she needed to stay awake when her children were sick so this has impacted her sleep.  Patient stated her agitation level has been high.  Patient provider explored therapy goals.  Patient stated she feels like she cannot process her thoughts when her days are so overwhelming and she cannot organize things.  She would like to improve stress management.  She stated when she has a bad day she falls into negative thinking patterns very quickly this can lead to being spiteful and nasty, pushing people away, increase of fatigue.  Patient provider explored distress tolerance skills.  Patient would like to improve parenting skills, self-esteem, and coping.  Patient and provider discussed need to stabilize  mood and discussed treatment of trauma/PTSD after effectively stabilizing mood.      Clinical Maneuvering/Intervention: Treatment plan development    (Scales based on 0 - 10 with 10 being the worst)  Depression: 4 Anxiety: 8     Assisted Patient in processing above session content; acknowledged and normalized patient’s thoughts, feelings, and concerns.  Rationalized patient thought process regarding depression and anxiety.  Discussed triggers associated with patient's depression and anxiety.  Also discussed coping skills for patient to implement such as distress tolerance.    Allowed Patient to freely discuss issues  without interruption or judgement with unconditional positive regard, active listening skills, and empathy. Therapist provided a safe, confidential environment to facilitate the development of a positive therapeutic relationship and encouraged open, honest communication. Assisted Patient in identifying risk factors which would indicate the need for higher level of care including thoughts to harm self or others and/or self-harming behavior and encouraged Patient to contact this office, call 911, or present to the nearest emergency room should any of these events occur. Discussed crisis intervention services and means to access. Patient adamantly and convincingly denies current suicidal or homicidal ideation or perceptual disturbance. Assisted Patient in processing session content; acknowledged and normalized Patient’s thoughts, feelings, and concerns by utilizing a person-centered approach in efforts to build appropriate rapport and a positive therapeutic relationship with open and honest communication. Therapist utilized dialectical behavior techniques to teach and model emotional regulation and relaxation methods. Therapist assisted Patient with identifying and implementing healthier coping strategies.     Assessment   Patient appears to be maintaining relative stability as compared to baseline.   Patient continues to struggle with depression and anxiety.   As a result, they can be reasonably expected to continue to benefit from treatment and would likely be at increased risk for decompensation otherwise.    Mental Status Exam:   Hygiene:   good  Cooperation:  Cooperative  Eye Contact:  Good  Psychomotor Behavior:  Appropriate  Affect:  Full range  Mood: fluctates  Speech:  Rapid  Thought Process:  Goal directed  Thought Content:  Mood congruent  Suicidal:  None  Homicidal:  None  Hallucinations:  None  Delusion:  None  Memory:  Intact  Orientation:  Person, Place, Time and Situation  Reliability:  good  Insight:  Good  Judgement:  Fair  Impulse Control:  Fair  Physical/Medical Issues:  No      PHQ-Score Total:  PHQ-9 Total Score:      JUANCARLOS-7:       Patient's Support Network Includes:      Functional Status: Moderate impairment     Progress toward goal: Not at goal    Prognosis: Good with Ongoing Treatment            Impression/Formulation:    VISIT DIAGNOSIS:     ICD-10-CM ICD-9-CM   1. Unspecified mood (affective) disorder ([facility])  F39 296.90        Patient appeared alert and oriented.  Patient is voluntarily requesting to continue outpatient therapy at [facility].  Patient is receptive to assistance with maintaining a stable lifestyle.  Patient presents with history of depression and anxiety.  Patient is agreeable to attend routine therapy sessions.  Patient expressed desire to maintain stability and participate in the therapeutic process.        Crisis Plan:  If symptoms/behaviors persist, Patient will present to the nearest hospital for an assessment. Advised patient of [facility] ER and assessment services.     Plan:   Patient will continue in individual outpatient therapy with focus on improved functioning and coping skills, maintaining stability, and avoiding decompensation and the need for higher level of care.    Patient will contact this office, call [phone]  or present to the nearest emergency room should suicidal or homicidal ideations occur. Provide Cognitive Behavioral Therapy and Solution Focused Therapy to improve functioning, maintain stability, and avoid decompensation and the need for higher level of care.     Return in about 1 weeks, or earlier if symptoms worsen or fail to improve.          This document has been electronically signed by Kayla Frank LCSW  October 24, 2021 14:31 EDT        Part of this note may be an electronic transcription/translation of spoken language to printed text using the Dragon Dictation System.

## 2021-10-19 ENCOUNTER — TELEMEDICINE (OUTPATIENT)
Dept: PSYCHIATRY | Facility: CLINIC | Age: 26
End: 2021-10-19

## 2021-10-19 DIAGNOSIS — M54.50 CHRONIC BILATERAL LOW BACK PAIN WITHOUT SCIATICA: ICD-10-CM

## 2021-10-19 DIAGNOSIS — F39 UNSPECIFIED MOOD (AFFECTIVE) DISORDER (HCC): Chronic | ICD-10-CM

## 2021-10-19 DIAGNOSIS — G89.29 CHRONIC BILATERAL LOW BACK PAIN WITHOUT SCIATICA: ICD-10-CM

## 2021-10-19 DIAGNOSIS — G47.9 SLEEPING DIFFICULTIES: ICD-10-CM

## 2021-10-19 DIAGNOSIS — F41.1 GENERALIZED ANXIETY DISORDER: Primary | Chronic | ICD-10-CM

## 2021-10-19 DIAGNOSIS — F41.0 PANIC DISORDER (EPISODIC PAROXYSMAL ANXIETY): ICD-10-CM

## 2021-10-19 DIAGNOSIS — R45.86 MOOD SWINGS: ICD-10-CM

## 2021-10-19 DIAGNOSIS — R45.4 IRRITABILITY: ICD-10-CM

## 2021-10-19 PROCEDURE — 99214 OFFICE O/P EST MOD 30 MIN: CPT | Performed by: NURSE PRACTITIONER

## 2021-10-19 RX ORDER — VENLAFAXINE HYDROCHLORIDE 150 MG/1
150 CAPSULE, EXTENDED RELEASE ORAL DAILY
Qty: 30 CAPSULE | Refills: 0 | Status: SHIPPED | OUTPATIENT
Start: 2021-10-19 | End: 2021-11-16 | Stop reason: SDUPTHER

## 2021-10-19 RX ORDER — LAMOTRIGINE 25 MG/1
25 TABLET ORAL DAILY
Qty: 30 TABLET | Refills: 0 | Status: SHIPPED | OUTPATIENT
Start: 2021-10-19 | End: 2021-11-16 | Stop reason: SDUPTHER

## 2021-10-19 NOTE — PROGRESS NOTES
"This provider is located at the [facility] (through ), [address], [facility], [address] using a secure [employer] Video Visit through [employer]. Patient is being seen remotely via telehealth at their home address in [place], and stated they are in a secure environment for this session. The patient's condition being diagnosed/treated is appropriate for telemedicine. The provider identified herself as well as her credentials. The patient, and/or patients guardian, consent to be seen remotely, and when consent is given they understand that the consent allows for patient identifiable information to be sent to a third party as needed. They may refuse to be seen remotely at any time. The electronic data is encrypted and password protected, and the patient and/or guardian has been advised of the potential risks to privacy not withstanding such measures.    You have chosen to receive care through a telehealth visit.  Do you consent to use a video/audio connection for your medical care today? Yes     Subjective   Genesis Chapin is a 26 y.o. female who is here today for medication management follow up.    Chief Complaint: depression, anxiety, panic attacks, sleeping difficulties    History of Present Illness:  The patient describes her mood as \"okay\" over the last few weeks.  Patient endorses that overall things been going okay for the past few weeks.  Patient states that she has been taking Lamictal for the past 2 weeks because her daughter has been sick.  Patient states that the Lamictal causes her to sleep very well and endorses that she was fearful that she would not be able to awaken in the nighttime if her daughter needed her.  Patient states that she has done well overall without the Lamictal, but endorses that it was very effective for helping with sleep and mood stabilization so she would like to restart the medication.  Patient states that overall for the past " few weeks she has been doing well, but endorses that she has had some periods where she will just get very overwhelmed and want to shut down still.  Patient states that she has noticed that her anxiety and depression tend to worsen the week before her menstrual cycle begins.  Patient states that the symptoms typically resolve once she begins her menstrual cycle.  Patient endorses that she met with her therapist and endorses that she has a very good relationship with her already.  Patient states that she is very pleased to be established with her therapist and endorses that they are going to start meeting weekly.  Patient endorses that overall she is very pleased with her current management of symptoms, but endorses that she would like to begin back taking the Lamictal as she thinks that this was helpful for further management of symptoms.  The patient reports her appetite as good.  The patient reports her sleep as fluctuating.  Patient states that her daughter has been sick which has caused her to have difficulty but states that prior to this she was sleeping well.  Patient states that she thinks that she would sleep well if it were not for her daughter being sick currently.  The patient denies any nightmares or bad dreams.  The patient denies any new medical problems or changes in medications since last appointment with this facility.  The patient denies any current side effects from her current medication regimen.  The patient denies any abnormal muscle movements or tics.  The patient rates her depression at a 4/10 on a 0-10 scale, with 10 being the worst.  The patient rates her anxiety at a 8.5-9/10 on a 0-10 scale, with 10 being the worst.  The patient rates hopelessness at a 0/10 on a 0-10 scale with 10 being the worst.  The patient denies suicidal ideations and homicidal ideations, and is convincing.  The patient denies any auditory hallucinations or visual hallucinations.  The patient does not endorse  significant symptoms consistent with a psychotic illness.                LMP:  Currently menstruating.  The patient denies any chance of pregnancy at this time.  The patient was educated that her prescribed medications can have potential risk to a developing fetus. The patient is advised to contact this APRN/this office if she becomes pregnant or plans to become pregnant.  Pt verbalizes understanding and acknowledged agreement with this plan in her own words.        The following portions of the patient's history were reviewed and updated as appropriate: allergies, current medications, past family history, past medical history, past social history, past surgical history and problem list.    Review of Systems   Constitutional: Positive for activity change and fatigue. Negative for appetite change and unexpected weight change.   Psychiatric/Behavioral: Positive for agitation, decreased concentration and dysphoric mood. Negative for behavioral problems, confusion, hallucinations, self-injury, sleep disturbance and suicidal ideas. The patient is nervous/anxious. The patient is not hyperactive.        Objective   Physical Exam  Constitutional:       Appearance: Normal appearance.   Neurological:      Mental Status: She is alert.   Psychiatric:         Attention and Perception: Perception normal. She is inattentive.         Mood and Affect: Affect normal. Mood is anxious and depressed.         Speech: Speech normal.         Behavior: Behavior normal. Behavior is cooperative.         Thought Content: Thought content normal. Thought content does not include homicidal or suicidal ideation. Thought content does not include homicidal or suicidal plan.         Cognition and Memory: Cognition and memory normal.         Judgment: Judgment is impulsive.       not currently breastfeeding.    The patient was seen remotely today via a MyChart Video Visit through [facility].  Unable to obtain vital signs due to nature of remote  visit.  Height stated at 67 inches.  Weight stated at 250 pounds.     Allergies   Allergen Reactions   • Amoxicillin Nausea And Vomiting   • Penicillins Nausea And Vomiting       No results found for this or any previous visit (from the past 2016 hour(s)).    Current Medications:   Current Outpatient Medications   Medication Sig Dispense Refill   • propranolol (INDERAL) 10 MG tablet Take 1 tablet by mouth 2 (Two) Times a Day As Needed (anxiety/panic). 60 tablet 0   • traMADol (ULTRAM) 50 MG tablet Take 1 tablet by mouth Every 6 (Six) Hours As Needed for Moderate Pain . 60 tablet 2   • venlafaxine XR (EFFEXOR-XR) 150 MG 24 hr capsule Take 1 capsule by mouth Daily. 30 capsule 0   • lamoTRIgine (LaMICtal) 25 MG tablet Take 1 tablet by mouth Daily. 30 tablet 0     No current facility-administered medications for this visit.       Mental Status Exam:   Hygiene:   good  Cooperation:  Cooperative  Eye Contact:  Good  Psychomotor Behavior:  Restless  Affect:  Full range  Hopelessness: Denies  Speech:  Normal  Thought Process:  Goal directed  Thought Content:  Normal  Suicidal:  None  Homicidal:  None  Hallucinations:  None  Delusion:  None  Memory:  Intact  Orientation:  Person, Place, Time and Situation  Reliability:  good  Insight:  Fair  Judgement:  Fair  Impulse Control:  Fair  Physical/Medical Issues:  Yes See medical history    PHQ-9 Depression Screening  Little interest or pleasure in doing things? (P) 1   Feeling down, depressed, or hopeless? (P) 0   Trouble falling or staying asleep, or sleeping too much? (P) 1   Feeling tired or having little energy? (P) 3   Poor appetite or overeating? (P) 0   Feeling bad about yourself - or that you are a failure or have let yourself or your family down? (P) 2   Trouble concentrating on things, such as reading the newspaper or watching television? (P) 3   Moving or speaking so slowly that other people could have noticed? Or the opposite - being so fidgety or restless that you  have been moving around a lot more than usual? (P) 1   Thoughts that you would be better off dead, or of hurting yourself in some way? (P) 0   PHQ-9 Total Score (P) 11   If you checked off any problems, how difficult have these problems made it for you to do your work, take care of things at home, or get along with other people? (P) Somewhat difficult        PHQ-Score Total:  PHQ-9 Total Score: (P) 11    JUANCARLOS-7  Feeling nervous, anxious or on edge: (P) Several days  Not being able to stop or control worrying: (P) Several days  Worrying too much about different things: (P) Several days  Trouble Relaxing: (P) Several days  Being so restless that it is hard to sit still: (P) Several days  Feeling afraid as if something awful might happen: (P) Several days  Becoming easily annoyed or irritable: (P) Several days  JUANCARLOS 7 Total Score: (P) 7  If you checked any problems, how difficult have these problems made it for you to do your work, take care of things at home, or get along with other people: (P) Somewhat difficult       PROMIS scale screening tool that patient filled out virtually reviewed by this APRN at today's encounter.     Assessment/Plan   Diagnoses and all orders for this visit:    1. Generalized anxiety disorder (Primary)  -     venlafaxine XR (EFFEXOR-XR) 150 MG 24 hr capsule; Take 1 capsule by mouth Daily.  Dispense: 30 capsule; Refill: 0    2. Panic disorder (episodic paroxysmal anxiety)  -     venlafaxine XR (EFFEXOR-XR) 150 MG 24 hr capsule; Take 1 capsule by mouth Daily.  Dispense: 30 capsule; Refill: 0    3. Unspecified mood (affective) disorder (HCC)  -     venlafaxine XR (EFFEXOR-XR) 150 MG 24 hr capsule; Take 1 capsule by mouth Daily.  Dispense: 30 capsule; Refill: 0  -     lamoTRIgine (LaMICtal) 25 MG tablet; Take 1 tablet by mouth Daily.  Dispense: 30 tablet; Refill: 0    4. Sleeping difficulties    5. Irritability  -     lamoTRIgine (LaMICtal) 25 MG tablet; Take 1 tablet by mouth Daily.  Dispense: 30  tablet; Refill: 0    6. Mood swings  -     lamoTRIgine (LaMICtal) 25 MG tablet; Take 1 tablet by mouth Daily.  Dispense: 30 tablet; Refill: 0            Visit Diagnoses:    ICD-10-CM ICD-9-CM   1. Generalized anxiety disorder  F41.1 300.02   2. Panic disorder (episodic paroxysmal anxiety)  F41.0 300.01   3. Unspecified mood (affective) disorder ([facility])  F39 296.90   4. Sleeping difficulties  G47.9 780.50   5. Irritability  R45.4 799.22   6. Mood swings  R45.86 799.24       GOALS:  Short Term Goals: Patient will be compliant with medication, and patient will have no significant medication related side effects.  Patient will be engaged in psychotherapy as indicated.  Patient will report subjective improvement of symptoms.  Long term goals: To stabilize mood and treat/improve subjective symptoms, the patient will stay out of the hospital, the patient will be at an optimal level of functioning, and the patient will take all medications as prescribed.  The patient verbalized understanding and agreement with goals that were mutually set.      SUICIDE RISK ASSESSMENT: Unalterable demographics and a history of mental health intervention indicate this patient is in a high risk category compared to the general population. At present, the patient denies active SI/HI, intentions, or plans at this time and agrees to seek immediate care should such thoughts develop. The patient verbalizes understanding of how to access emergency care if needed and agrees to do so. Consideration of suicide risk and protective factors such as history, current presentation, individual strengths and weaknesses, psychosocial and environmental stressors and variables, psychiatric illness and symptoms, medical conditions and pain, took place in this interview. Based on those considerations, the patient is determined: within individual baseline and presenting no imminent risk for suicide or homicide. Other recommendations: The patient does not meet the  criteria for inpatient admission and is not a safety risk to self or others at today's visit. Inpatient treatment offers no significant advantages over outpatient treatment for this patient at today's visit.      SAFETY PLAN:  Patient was given ample time for questions and fully participated in treatment planning.  Patient was encouraged to call the clinic with any questions or concerns.  Patient was informed of access to emergency care. If patient were to develop any significant symptomatology, suicidal ideation, homicidal ideation, any concerns, or feel unsafe at any time they are to call the clinic and if unable to get immediate assistance should immediately call 911 or go to the nearest emergency room.  The patient is advised to remove or secure (lock away) all lethal weapons (including guns) and sharps (including razors, scissors, knives, etc.).  All medications (including any prescribed and any over the counter medications) should be stored in a safe and secured location that is not obtainable by children/adolescents.  Patient was given an opportunity and encouraged to ask questions about their medication, illness, and treatment. Patient contracted verbally for the following: If you are experiencing an emotional crisis or have thoughts of harming yourself or others, please go to your nearest local emergency room or call 911. Will continue to re-assess medication response and side effects frequently to establish efficacy and ensure safety. Risks, any black box warnings, side effects, off label usage, and benefits of medication and treatment discussed with patient, along with potential adverse side effects of current and/or newly prescribed medication, alternative treatment options, and OTC medications.  Patient verbalized understanding of potential risks, any off label use of medication, any black box warnings, and any side effects in their own words. The patient verbalized understanding and agreed to comply  with the safety plan discussed in their own words.  Patient given the number to the office. Number also available to the 24- hour suicide hotline.      TREATMENT PLAN/GOALS: Continue medications and treatment plan as indicated. Treatment and medication options discussed during today's visit. Patient acknowledged and verbally consented to continue with current treatment plan and was educated on the importance of compliance with treatment and follow-up appointments.      -Restart Lamictal 25 mg daily for mood stabilization.  Patient endorses that she has not taken this medication in the past 2 weeks due to her daughter being sick and fears that she would not be able to awaken in the night if her daughter needed her.  Patient states that her daughter is getting better now and she thinks she is ready to start back on the Lamictal as it was very effective for mood stabilization and controlling anxiety.  -Continue Effexor  mg daily for depression/mood and anxiety  -Continue propanolol 10 mg twice daily as needed for anxiety/panic  -Continue psychotherapy with Kayla Frank LCSW  -Rule out bipolar/mood disorder      MEDICATION ISSUES: Discussed medication options and treatment plan of prescribed medication, any off label use of medication, as well as the risks, benefits, any black box warnings including increased suicidality, and side effects including but not limited to potential falls, dizziness, possible impaired driving, GI side effects (change in appetite, abdominal discomfort, nausea, vomiting, diarrhea, and/or constipation), dry mouth, somnolence, sedation, insomnia, activation, agitation, irritation, tremors, abnormal muscle movements or disorders, headache, sweating, possible bruising or rare bleeding, electrolyte and/or fluid abnormalities, change in blood pressure/heart rate/and or heart rhythm, sexual dysfunction, and metabolic adversities among others. Patient and/or guardian agreeable to call the  office with any worsening of symptoms or onset of side effects, or if any concerns or questions arise.  The contact information for the office is made available to the patient and/or guardian.  Patient and/or guardian agreeable to call 911 or go to the nearest ER should they begin having any SI/HI, or if any urgent concerns arise. No medication side effects or related complaints today.    This APRN has discussed the benefits and risks of starting Lamictal (Lamotrigine).  The side effects of Lamictal can include a benign rash, blurred or double vision, dizziness, ataxia, sedation, headache, tremor, insomnia, poor coordination, fatigue,  nausea, vomiting, dyspepsia, rhinitis, infection, pharyngitis, asthenia, a rare but serious rash, rare multi-organ failure associated with Torres-Sander Syndrome, toxic epidermal necrolysis, drug hypersensitivity syndrome, rare blood dyscrasias, rare aseptic meningitis, rare sudden unexplained deaths in people with epilepsy, withdrawal seizures upon abrupt withdrawal, and rare activation of suicidal ideation and behavior (suicidality).  This APRN has discussed with the patient that a very slow dose titration when starting Lamictal may reduce the incidence of skin rash and other side effects.  The dosage should not be titrated upwards or increased faster than recommended due to the possibility of the discussed side effects and risk of development of a skin rash (which can become life threatening).      This APRN has also discussed with the patient that if the patient stops taking the Lamictal for 5 days or longer, it will be necessary to restart the drug with the initial dose titration, as rashes have been reported on reexposure.    This APRN has also discussed with the patient that the patient should avoid new medications, foods, or products during the first 3 months of Lamictal treatment in order to decrease the risk of unrelated rash.  The patient should also not start Lamictal  within 2 weeks of a viral infection, a rash, or a vaccination.  Also, if the patient and Provider decide to stop the Lamictal, the patient will follow the directions of this APRN/this office as a guided taper over about two weeks is appropriate due to the risk of relapse in bipolar disorder, the risk of seizures in those with epilepsy, and discontinuation symptoms upon rapid discontinuation of Lamictal.    The patient verbalizes understanding of benefits and risks as discussed, the patient feels the benefits outweigh the risks and is agreeable to start Lamictal via a slow titration schedule as discussed.  The patient is advised should any side effects or rash develops they are to stop the Lamictal immediately and contact this APRN/this office or go to the emergency department immediately.  The patient verbalizes understanding and agreement with treatment plan in their own words.                [facility] No Show Policy:  We understand unexpected circumstances arise; however, anytime you miss your appointment we are unable to provide you appropriate care.  In addition, each appointment missed could have been used to provide care for others.  We ask that you call at least 24 hours in advance to cancel or reschedule an appointment.  We would like to take this opportunity to remind you of our policy stating patients who miss THREE or more appointments without cancelling or rescheduling 24 hours in advance of the appointment may be subject to cancellation of any further visits with our clinic and recommendation to seek in-person services/visits.    Please call [phone] to reschedule your appointment. If there are reasons that make it difficult for you to keep the appointments, please call and let us know how we can help.  Please understand that medication prescribing will not continue without seeing your provider.      [facility]'s No Show Policy reviewed with patient  at today's visit. Patient verbalized understanding of this policy. Discussed with patient that in the event that there are three or more no show visits, it will be recommended that they pursue in-person services/visits as noncompliance with telehealth visits indicates that patient is not an appropriate candidate for telemedicine and would likely be more appropriate for in-person services/visits. Patient verbalizes understanding and is agreeable to this.          MEDS ORDERED DURING VISIT:  New Medications Ordered This Visit   Medications   • venlafaxine XR (EFFEXOR-XR) 150 MG 24 hr capsule     Sig: Take 1 capsule by mouth Daily.     Dispense:  30 capsule     Refill:  0   • lamoTRIgine (LaMICtal) 25 MG tablet     Sig: Take 1 tablet by mouth Daily.     Dispense:  30 tablet     Refill:  0       Return in about 4 weeks (around 11/16/2021), or if symptoms worsen or fail to improve, for Recheck.        Patient will follow-up in 4 weeks, highly encouraged the patient if she had any questions or concerns to contact the behavioral health [facility] clinic for sooner appointment patient verbalized understanding.      Functional Status: Moderate impairment     Prognosis: Guarded with Ongoing Treatment            This document has been electronically signed by LENA Barajas  October 19, 2021 08:49 EDT    Part of this note may be an electronic transcription/translation of spoken language to printed text using the Dragon Dictation System.

## 2021-10-20 ENCOUNTER — TELEMEDICINE (OUTPATIENT)
Dept: PSYCHIATRY | Facility: CLINIC | Age: 26
End: 2021-10-20

## 2021-10-20 DIAGNOSIS — F41.1 GENERALIZED ANXIETY DISORDER: Primary | ICD-10-CM

## 2021-10-20 PROCEDURE — 90837 PSYTX W PT 60 MINUTES: CPT | Performed by: SOCIAL WORKER

## 2021-10-20 RX ORDER — TRAMADOL HYDROCHLORIDE 50 MG/1
50 TABLET ORAL EVERY 6 HOURS PRN
Qty: 60 TABLET | Refills: 2 | Status: SHIPPED | OUTPATIENT
Start: 2021-10-20 | End: 2022-02-23

## 2021-10-20 NOTE — PROGRESS NOTES
[facility]   Follow-up Progress Note      Date: October 31, 2021  Time In: 9:31am  Time Out: 10:32 AM        PROGRESS NOTE  Data:  Genesis Chapin is a 26 y.o. female presenting to [facility] for follow-up with Kayla Frank LCSW. The patient is seen remotely using [facility] My Chart. Patient is being seen via telehealth and stated they are in a secure environment for this session. The patient's condition being diagnosed/treated is appropriate for telemedicine. The provider identified herself as well as her credentials. The patient and/or patients guardian consent to be seen remotely, and when consent is given they understand that the consent allows for patient identifiable information to be sent to a third party as needed. They may refuse to be seen remotely at any time. The electronic data is encrypted and password protected, and the patient has been advised of the potential risks to privacy not withstanding such measures.     Today Patient presented for follow-up.  Patient checked in with how she has been.  Patient expressed struggling with parenting, schedules, and routine.  Patient explored thoughts related to anxiety and parenting.  Patient expressed many different feelings throughout session regarding parenting, coparenting with her stepson's mother, and parenting special needs children.  Patient and provider explored ways to decrease unhelpful or irrational thinking.     Clinical Maneuvering/Intervention: CBT     Assisted Patient in processing above session content; acknowledged and normalized patient’s thoughts, feelings, and concerns.  Rationalized patient thought process regarding parenting.  Discussed triggers associated with patient's anxiety.  Also discussed coping skills for patient to implement such as reframing thoughts.     Allowed Patient to freely discuss issues  without interruption or judgement with unconditional  positive regard, active listening skills, and empathy. Therapist provided a safe, confidential environment to facilitate the development of a positive therapeutic relationship and encouraged open, honest communication. Assisted Patient in identifying risk factors which would indicate the need for higher level of care including thoughts to harm self or others and/or self-harming behavior and encouraged Patient to contact this office, call 911, or present to the nearest emergency room should any of these events occur. Discussed crisis intervention services and means to access. Patient adamantly and convincingly denies current suicidal or homicidal ideation or perceptual disturbance. Assisted Patient in processing session content; acknowledged and normalized Patient’s thoughts, feelings, and concerns by utilizing a person-centered approach in efforts to build appropriate rapport and a positive therapeutic relationship with open and honest communication. Therapist utilized dialectical behavior techniques to teach and model emotional regulation and relaxation methods. Therapist assisted Patient with identifying and implementing healthier coping strategies.         Assessment      Patient appears to be maintaining relative stability as compared to baseline.  Patient continues to struggle with anxiety.   As a result, they can be reasonably expected to continue to benefit from treatment and would likely be at increased risk for decompensation otherwise.     Mental Status Exam:   Hygiene:   good  Cooperation:  Cooperative  Eye Contact:  Good  Psychomotor Behavior:  Appropriate  Affect:  Appropriate  Mood: normal  Speech:  Normal  Thought Process:  Goal directed  Thought Content:  Normal  Suicidal:  None  Homicidal:  None  Hallucinations:  None  Delusion:  None  Memory:  Intact  Orientation:  Grossly intact  Reliability:  good  Insight:  Fair  Judgement:  Fair  Impulse Control:  Fair  Physical/Medical Issues:  No        PHQ-Score Total:  PHQ-9 Total Score:       JUANCARLOS-7:     Patient's Support Network Includes:       Functional Status: Moderate impairment      Progress toward goal: Not at goal     Prognosis: Good with Ongoing Treatment                  Impression/Formulation:     VISIT DIAGNOSIS:   Visit Diagnosis       ICD-10-CM ICD-9-CM   1. Generalized anxiety disorder  F41.1 300.02            Patient appeared alert and oriented.  Patient is voluntarily requesting to continue outpatient therapy at [facility].  Patient is receptive to assistance with maintaining a stable lifestyle.  Patient presents with history of anxiety.  Patient is agreeable to attend routine therapy sessions.  Patient expressed desire to maintain stability and participate in the therapeutic process.          Crisis Plan:  If symptoms/behaviors persist, Patient will present to the nearest hospital for an assessment. Advised patient of [facility] ER and assessment services.      Plan:   Patient will continue in individual outpatient therapy with focus on improved functioning and coping skills, maintaining stability, and avoiding decompensation and the need for higher level of care.     Patient will contact this office, call 911 or present to the nearest emergency room should suicidal or homicidal ideations occur. Provide Cognitive Behavioral Therapy and Solution Focused Therapy to improve functioning, maintain stability, and avoid decompensation and the need for higher level of care.      Return in about 2 weeks, or earlier if symptoms worsen or fail to improve          This document has been electronically signed by Kayla Frank LCSW  October 31, 2021 19:11 EDT           Part of this note may be an electronic transcription/translation of spoken language to printed text using the Dragon Dictation System.

## 2021-10-21 ENCOUNTER — TELEPHONE (OUTPATIENT)
Dept: INTERNAL MEDICINE | Facility: CLINIC | Age: 26
End: 2021-10-21

## 2021-10-27 ENCOUNTER — TELEMEDICINE (OUTPATIENT)
Dept: PSYCHIATRY | Facility: CLINIC | Age: 26
End: 2021-10-27

## 2021-10-27 DIAGNOSIS — F41.1 GENERALIZED ANXIETY DISORDER: Primary | ICD-10-CM

## 2021-10-27 PROCEDURE — 90837 PSYTX W PT 60 MINUTES: CPT | Performed by: SOCIAL WORKER

## 2021-10-27 NOTE — PROGRESS NOTES
[facility]   Follow-up Progress Note     Date: October 28, 2021  Time In: 9:39AM  Time Out: 10:44 AM      PROGRESS NOTE  Data:  Genesis Chapin is a 26 y.o. female presenting to [facility] for follow-up with Kayla Frank LCSW. The patient is seen remotely using [facility] My Chart. Patient is being seen via telehealth and stated they are in a secure environment for this session. The patient's condition being diagnosed/treated is appropriate for telemedicine. The provider identified herself as well as her credentials. The patient and/or patients guardian consent to be seen remotely, and when consent is given they understand that the consent allows for patient identifiable information to be sent to a third party as needed. They may refuse to be seen remotely at any time. The electronic data is encrypted and password protected, and the patient has been advised of the potential risks to privacy not withstanding such measures.    Today Patient checked in.  Patient identified ongoing stress at home with her  and parenting.  Patient expressed many feelings and identified high distress and frustration.  Patient provider explored communication issues with , emotional regulation skills, ways to improve mood and self talk.  Patient and provider discussed beginning trauma treatment at next visit and will continue ongoing assessment for PTSD.    Clinical Maneuvering/Intervention: CBT    Assisted Patient in processing above session content; acknowledged and normalized patient’s thoughts, feelings, and concerns.  Rationalized patient thought process regarding communication, marital issues, parenting.  Discussed triggers associated with patient's depression and anxiety.  Also discussed coping skills for patient to implement such as deep breathing, positive self talk.    Allowed Patient to freely discuss issues  without interruption or  judgement with unconditional positive regard, active listening skills, and empathy. Therapist provided a safe, confidential environment to facilitate the development of a positive therapeutic relationship and encouraged open, honest communication. Assisted Patient in identifying risk factors which would indicate the need for higher level of care including thoughts to harm self or others and/or self-harming behavior and encouraged Patient to contact this office, call 911, or present to the nearest emergency room should any of these events occur. Discussed crisis intervention services and means to access. Patient adamantly and convincingly denies current suicidal or homicidal ideation or perceptual disturbance. Assisted Patient in processing session content; acknowledged and normalized Patient’s thoughts, feelings, and concerns by utilizing a person-centered approach in efforts to build appropriate rapport and a positive therapeutic relationship with open and honest communication. Therapist utilized dialectical behavior techniques to teach and model emotional regulation and relaxation methods. Therapist assisted Patient with identifying and implementing healthier coping strategies.     Assessment   Patient appears to be maintaining relative stability as compared to baseline.  Patient continues to struggle with depression and anxiety.   As a result, they can be reasonably expected to continue to benefit from treatment and would likely be at increased risk for decompensation otherwise.    Mental Status Exam:   Hygiene:   good  Cooperation:  Cooperative  Eye Contact:  Good  Psychomotor Behavior:  Appropriate  Affect:  Appropriate  Mood: normal  Speech:  Normal  Thought Process:  Goal directed  Thought Content:  Normal  Suicidal:  None  Homicidal:  None  Hallucinations:  None  Delusion:  None  Memory:  Intact  Orientation:  Person, Place, Time and Situation  Reliability:  good  Insight:  Fair  Judgement:  Fair  Impulse  Control:  Fair  Physical/Medical Issues:  No      PHQ-Score Total:  PHQ-9 Total Score:      JUANCARLOS-7:       Patient's Support Network Includes:      Functional Status: Moderate impairment     Progress toward goal: Not at goal    Prognosis: Good with Ongoing Treatment            Impression/Formulation:    VISIT DIAGNOSIS:     ICD-10-CM ICD-9-CM   1. Generalized anxiety disorder  F41.1 300.02        Patient appeared alert and oriented.  Patient is voluntarily requesting to continue outpatient therapy at [facility].  Patient is receptive to assistance with maintaining a stable lifestyle.  Patient presents with history of depression and anxiety.  Patient is agreeable to attend routine therapy sessions.  Patient expressed desire to maintain stability and participate in the therapeutic process.        Crisis Plan:  If symptoms/behaviors persist, Patient will present to the nearest hospital for an assessment. Advised patient of [facility] ER and assessment services.     Plan:   Patient will continue in individual outpatient therapy with focus on improved functioning and coping skills, maintaining stability, and avoiding decompensation and the need for higher level of care.    Patient will contact this office, call 911 or present to the nearest emergency room should suicidal or homicidal ideations occur. Provide Cognitive Behavioral Therapy and Solution Focused Therapy to improve functioning, maintain stability, and avoid decompensation and the need for higher level of care.     Return in about 2 weeks, or earlier if symptoms worsen or fail to improve.        This document has been electronically signed by Kayla Frank LCSW  October 28, 2021 09:02 EDT        Part of this note may be an electronic transcription/translation of spoken language to printed text using the Dragon Dictation System.

## 2021-11-02 ENCOUNTER — TELEMEDICINE (OUTPATIENT)
Dept: PSYCHIATRY | Facility: CLINIC | Age: 26
End: 2021-11-02

## 2021-11-02 DIAGNOSIS — F41.1 GENERALIZED ANXIETY DISORDER: Primary | ICD-10-CM

## 2021-11-02 PROCEDURE — 90837 PSYTX W PT 60 MINUTES: CPT | Performed by: SOCIAL WORKER

## 2021-11-02 NOTE — PROGRESS NOTES
[facility]   Follow-up Progress Note     Date: November 2, 2021  Time In: 8:34AM  Time Out: 9:32AM      PROGRESS NOTE  Data:  Genesis Chapin is a 26 y.o. female presenting to [facility] for follow-up with Kayla Frank LCSW. The patient is seen remotely using [facility] My Chart. Patient is being seen via telehealth and stated they are in a secure environment for this session. The patient's condition being diagnosed/treated is appropriate for telemedicine. The provider identified herself as well as her credentials. The patient and/or patients guardian consent to be seen remotely, and when consent is given they understand that the consent allows for patient identifiable information to be sent to a third party as needed. They may refuse to be seen remotely at any time. The electronic data is encrypted and password protected, and the patient has been advised of the potential risks to privacy not withstanding such measures.    Today Patient presented for follow-up and stated she called the office and was able to find a cancellation.  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  Patient is struggling with self blame for her son's lack of progress in areas at school.  Patient and provider explored Socratic dialogue to challenge irrational thinking.  Patient and provider practiced emotional regulation skills such as deep breathing.  Patient was able to successfully challenge irrational thoughts and worked to reframe or replace unhelpful thoughts.    Clinical Maneuvering/Intervention: CBT    Assisted Patient in processing above session content; acknowledged and normalized patient’s thoughts, feelings, and concerns.  Rationalized patient thought process regarding parenting.  Discussed triggers associated with patient's anxiety.  Also discussed coping skills for patient to implement such as  deep breathing and Socratic questioning.    Allowed Patient to freely discuss issues  without interruption or judgement with unconditional positive regard, active listening skills, and empathy. Therapist provided a safe, confidential environment to facilitate the development of a positive therapeutic relationship and encouraged open, honest communication. Assisted Patient in identifying risk factors which would indicate the need for higher level of care including thoughts to harm self or others and/or self-harming behavior and encouraged Patient to contact this office, call 911, or present to the nearest emergency room should any of these events occur. Discussed crisis intervention services and means to access. Patient adamantly and convincingly denies current suicidal or homicidal ideation or perceptual disturbance. Assisted Patient in processing session content; acknowledged and normalized Patient’s thoughts, feelings, and concerns by utilizing a person-centered approach in efforts to build appropriate rapport and a positive therapeutic relationship with open and honest communication. Therapist utilized dialectical behavior techniques to teach and model emotional regulation and relaxation methods. Therapist assisted Patient with identifying and implementing healthier coping strategies.     Assessment   Patient appears to be maintaining relative stability as compared to baseline.  Patient continues to struggle with anxiety.   As a result, they can be reasonably expected to continue to benefit from treatment and would likely be at increased risk for decompensation otherwise.    Mental Status Exam:   Hygiene:   good  Cooperation:  Cooperative  Eye Contact:  Good  Psychomotor Behavior:  Appropriate  Affect:  Appropriate  Mood: normal  Speech:  Normal  Thought Process:  Goal directed  Thought Content:  Normal  Suicidal:  None  Homicidal:  None  Hallucinations:  None  Delusion:  None  Memory:  Intact  Orientation:   Person, Place, Time and Situation  Reliability:  good  Insight:  Good  Judgement:  Good  Impulse Control:  Good  Physical/Medical Issues:  No      PHQ-Score Total:  PHQ-9 Total Score:      JUANCARLOS-7:       Patient's Support Network Includes:      Functional Status: Moderate impairment     Progress toward goal: Not at goal    Prognosis: Good with Ongoing Treatment            Impression/Formulation:    VISIT DIAGNOSIS:     ICD-10-CM ICD-9-CM   1. Generalized anxiety disorder  F41.1 300.02        Patient appeared alert and oriented.  Patient is voluntarily requesting to continue outpatient therapy at [facility].  Patient is receptive to assistance with maintaining a stable lifestyle.  Patient presents with history of anxiety.  Patient is agreeable to attend routine therapy sessions.  Patient expressed desire to maintain stability and participate in the therapeutic process.        Crisis Plan:  If symptoms/behaviors persist, Patient will present to the nearest hospital for an assessment. Advised patient of [facility] ER and assessment services.     Plan:   Patient will continue in individual outpatient therapy with focus on improved functioning and coping skills, maintaining stability, and avoiding decompensation and the need for higher level of care.    Patient will contact this office, call 911 or present to the nearest emergency room should suicidal or homicidal ideations occur. Provide Cognitive Behavioral Therapy and Solution Focused Therapy to improve functioning, maintain stability, and avoid decompensation and the need for higher level of care.     Return in about 2 weeks, or earlier if symptoms worsen or fail to improve.        This document has been electronically signed by Kayla Frank LCSW  November 2, 2021 10:29 EDT        Part of this note may be an electronic transcription/translation of spoken language to printed text using the Dragon Dictation System.

## 2021-11-16 ENCOUNTER — TELEMEDICINE (OUTPATIENT)
Dept: PSYCHIATRY | Facility: CLINIC | Age: 26
End: 2021-11-16

## 2021-11-16 DIAGNOSIS — R45.4 IRRITABILITY: ICD-10-CM

## 2021-11-16 DIAGNOSIS — F39 UNSPECIFIED MOOD (AFFECTIVE) DISORDER (HCC): Chronic | ICD-10-CM

## 2021-11-16 DIAGNOSIS — F41.1 GENERALIZED ANXIETY DISORDER: Primary | Chronic | ICD-10-CM

## 2021-11-16 DIAGNOSIS — F41.0 PANIC DISORDER (EPISODIC PAROXYSMAL ANXIETY): ICD-10-CM

## 2021-11-16 DIAGNOSIS — G47.9 SLEEPING DIFFICULTIES: ICD-10-CM

## 2021-11-16 DIAGNOSIS — R45.86 MOOD SWINGS: ICD-10-CM

## 2021-11-16 PROCEDURE — 99214 OFFICE O/P EST MOD 30 MIN: CPT | Performed by: NURSE PRACTITIONER

## 2021-11-16 RX ORDER — VENLAFAXINE HYDROCHLORIDE 150 MG/1
150 CAPSULE, EXTENDED RELEASE ORAL DAILY
Qty: 30 CAPSULE | Refills: 0 | Status: SHIPPED | OUTPATIENT
Start: 2021-11-16 | End: 2021-12-07 | Stop reason: SDUPTHER

## 2021-11-16 RX ORDER — LAMOTRIGINE 25 MG/1
25 TABLET ORAL 2 TIMES DAILY
Qty: 60 TABLET | Refills: 0 | Status: SHIPPED | OUTPATIENT
Start: 2021-11-16 | End: 2021-12-07 | Stop reason: SDUPTHER

## 2021-11-16 NOTE — PROGRESS NOTES
"This provider is located at the [facility] (through [facility]), [address], [facility], [address] using a secure [employer] Video Visit through [employer]. Patient is being seen remotely via telehealth at their home address in [place], and stated they are in a secure environment for this session. The patient's condition being diagnosed/treated is appropriate for telemedicine. The provider identified herself as well as her credentials. The patient, and/or patients guardian, consent to be seen remotely, and when consent is given they understand that the consent allows for patient identifiable information to be sent to a third party as needed. They may refuse to be seen remotely at any time. The electronic data is encrypted and password protected, and the patient and/or guardian has been advised of the potential risks to privacy not withstanding such measures.    You have chosen to receive care through a telehealth visit.  Do you consent to use a video/audio connection for your medical care today? Yes     Subjective   Genesis Chapin is a 26 y.o. female who is here today for medication management follow up.    Chief Complaint: depression, anxiety, panic attacks, sleeping difficulties    History of Present Illness:  The patient describes her mood as \"okay\" over the last few weeks.  The patient states that she has been doing okay, but states that her anxiety has still been really high.  Patient states that she does not necessarily feel down or depressed, but endorses that she frequently feels very overwhelmed.  Patient states that she is having significant difficulty with focus and concentration and is concerned about ADHD.  Patient states that she constantly has many things going on inside of her brain and cannot focus or concentrate.  Discussed patient that this is likely due to her anxiety and how overwhelmed she is feeling.  Discussed with patient further management of her " "anxiety should therefore result in improvements in focus and concentration.  Patient states that she is tolerated starting the Lamictal and endorses that it has been somewhat effective for her anxiety/irritability mood stabilization, but endorses that she thinks she needs an increased dose of this medication.  Discussed patient that we will increase Lamictal to 50 mg daily to target mood stabilization and anxiety at this time.  Patient verbalizes understanding and endorses that she is agreeable to this.  The patient reports her appetite as good.  The patient reports her sleep as good.  The patient states that she has been sleeping well since starting the Lamictal again.  The patient denies any nightmares or bad dreams.  The patient denies any new medical problems or changes in medications since last appointment with this facility.  The patient reports compliance with current medication regimen.  The patient denies any current side effects from her current medication regimen.  The patient denies any abnormal muscle movements or tics.  The patient rates her depression at a 3/10 on a 0-10 scale, with 10 being the worst.  The patient rates her anxiety at a \"10+\"/10 on a 0-10 scale, with 10 being the worst.  The patient rates hopelessness at a 0/10 on a 0-10 scale with 10 being the worst.  The patient denies suicidal ideations and homicidal ideations, and is convincing.  The patient denies any auditory hallucinations or visual hallucinations.  The patient does not endorse significant symptoms consistent with a psychotic illness.                    LMP:  4 weeks ago.  The patient denies any chance of pregnancy at this time.  The patient was educated that her prescribed medications can have potential risk to a developing fetus. The patient is advised to contact this APRN/this office if she becomes pregnant or plans to become pregnant.  Pt verbalizes understanding and acknowledged agreement with this plan in her own " words.        The following portions of the patient's history were reviewed and updated as appropriate: allergies, current medications, past family history, past medical history, past social history, past surgical history and problem list.    Review of Systems   Constitutional: Negative for activity change, appetite change, fatigue and unexpected weight change.   Psychiatric/Behavioral: Positive for agitation, decreased concentration and dysphoric mood. Negative for behavioral problems, confusion, hallucinations, self-injury, sleep disturbance and suicidal ideas. The patient is nervous/anxious. The patient is not hyperactive.        Objective   Physical Exam  Constitutional:       Appearance: Normal appearance.   Neurological:      Mental Status: She is alert.   Psychiatric:         Attention and Perception: Perception normal. She is inattentive.         Mood and Affect: Affect normal. Mood is anxious.         Speech: Speech normal.         Behavior: Behavior normal. Behavior is cooperative.         Thought Content: Thought content normal. Thought content does not include homicidal or suicidal ideation. Thought content does not include homicidal or suicidal plan.         Cognition and Memory: Cognition and memory normal.         Judgment: Judgment is impulsive.       not currently breastfeeding.    The patient was seen remotely today via a MyChart Video Visit through [facility].  Unable to obtain vital signs due to nature of remote visit.  Height stated at 67 inches.  Weight stated at 250 pounds.     Allergies   Allergen Reactions   • Amoxicillin Nausea And Vomiting   • Penicillins Nausea And Vomiting       No results found for this or any previous visit (from the past 2016 hour(s)).    Current Medications:   Current Outpatient Medications   Medication Sig Dispense Refill   • lamoTRIgine (LaMICtal) 25 MG tablet Take 1 tablet by mouth 2 (Two) Times a Day. 60 tablet 0   • propranolol (INDERAL) 10 MG tablet Take 1 tablet by  mouth 2 (Two) Times a Day As Needed (anxiety/panic). 60 tablet 0   • traMADol (ULTRAM) 50 MG tablet Take 1 tablet by mouth Every 6 (Six) Hours As Needed for Moderate Pain . 60 tablet 2   • venlafaxine XR (EFFEXOR-XR) 150 MG 24 hr capsule Take 1 capsule by mouth Daily. 30 capsule 0     No current facility-administered medications for this visit.       Mental Status Exam:   Hygiene:   good  Cooperation:  Cooperative  Eye Contact:  Good  Psychomotor Behavior:  Restless  Affect:  Full range  Hopelessness: Denies  Speech:  Normal  Thought Process:  Goal directed  Thought Content:  Normal  Suicidal:  None  Homicidal:  None  Hallucinations:  None  Delusion:  None  Memory:  Intact  Orientation:  Person, Place, Time and Situation  Reliability:  good  Insight:  Fair  Judgement:  Fair  Impulse Control:  Fair  Physical/Medical Issues:  Yes See medical history        Assessment/Plan   Diagnoses and all orders for this visit:    1. Generalized anxiety disorder (Primary)  -     venlafaxine XR (EFFEXOR-XR) 150 MG 24 hr capsule; Take 1 capsule by mouth Daily.  Dispense: 30 capsule; Refill: 0    2. Panic disorder (episodic paroxysmal anxiety)  -     venlafaxine XR (EFFEXOR-XR) 150 MG 24 hr capsule; Take 1 capsule by mouth Daily.  Dispense: 30 capsule; Refill: 0    3. Unspecified mood (affective) disorder (HCC)  -     venlafaxine XR (EFFEXOR-XR) 150 MG 24 hr capsule; Take 1 capsule by mouth Daily.  Dispense: 30 capsule; Refill: 0  -     lamoTRIgine (LaMICtal) 25 MG tablet; Take 1 tablet by mouth 2 (Two) Times a Day.  Dispense: 60 tablet; Refill: 0    4. Mood swings  -     lamoTRIgine (LaMICtal) 25 MG tablet; Take 1 tablet by mouth 2 (Two) Times a Day.  Dispense: 60 tablet; Refill: 0    5. Irritability  -     lamoTRIgine (LaMICtal) 25 MG tablet; Take 1 tablet by mouth 2 (Two) Times a Day.  Dispense: 60 tablet; Refill: 0    6. Sleeping difficulties            Visit Diagnoses:    ICD-10-CM ICD-9-CM   1. Generalized anxiety disorder   F41.1 300.02   2. Panic disorder (episodic paroxysmal anxiety)  F41.0 300.01   3. Unspecified mood (affective) disorder ([facility])  F39 296.90   4. Mood swings  R45.86 799.24   5. Irritability  R45.4 799.22   6. Sleeping difficulties  G47.9 780.50       GOALS:  Short Term Goals: Patient will be compliant with medication, and patient will have no significant medication related side effects.  Patient will be engaged in psychotherapy as indicated.  Patient will report subjective improvement of symptoms.  Long term goals: To stabilize mood and treat/improve subjective symptoms, the patient will stay out of the hospital, the patient will be at an optimal level of functioning, and the patient will take all medications as prescribed.  The patient verbalized understanding and agreement with goals that were mutually set.      SUICIDE RISK ASSESSMENT: Unalterable demographics and a history of mental health intervention indicate this patient is in a high risk category compared to the general population. At present, the patient denies active SI/HI, intentions, or plans at this time and agrees to seek immediate care should such thoughts develop. The patient verbalizes understanding of how to access emergency care if needed and agrees to do so. Consideration of suicide risk and protective factors such as history, current presentation, individual strengths and weaknesses, psychosocial and environmental stressors and variables, psychiatric illness and symptoms, medical conditions and pain, took place in this interview. Based on those considerations, the patient is determined: within individual baseline and presenting no imminent risk for suicide or homicide. Other recommendations: The patient does not meet the criteria for inpatient admission and is not a safety risk to self or others at today's visit. Inpatient treatment offers no significant advantages over outpatient treatment for this patient at today's visit.      SAFETY  PLAN:  Patient was given ample time for questions and fully participated in treatment planning.  Patient was encouraged to call the clinic with any questions or concerns.  Patient was informed of access to emergency care. If patient were to develop any significant symptomatology, suicidal ideation, homicidal ideation, any concerns, or feel unsafe at any time they are to call the clinic and if unable to get immediate assistance should immediately call 911 or go to the nearest emergency room.  The patient is advised to remove or secure (lock away) all lethal weapons (including guns) and sharps (including razors, scissors, knives, etc.).  All medications (including any prescribed and any over the counter medications) should be stored in a safe and secured location that is not obtainable by children/adolescents.  Patient was given an opportunity and encouraged to ask questions about their medication, illness, and treatment. Patient contracted verbally for the following: If you are experiencing an emotional crisis or have thoughts of harming yourself or others, please go to your nearest local emergency room or call 911. Will continue to re-assess medication response and side effects frequently to establish efficacy and ensure safety. Risks, any black box warnings, side effects, off label usage, and benefits of medication and treatment discussed with patient, along with potential adverse side effects of current and/or newly prescribed medication, alternative treatment options, and OTC medications.  Patient verbalized understanding of potential risks, any off label use of medication, any black box warnings, and any side effects in their own words. The patient verbalized understanding and agreed to comply with the safety plan discussed in their own words.  Patient given the number to the office. Number also available to the 24- hour suicide hotline.      TREATMENT PLAN/GOALS: Continue medications and treatment plan as  indicated. Treatment and medication options discussed during today's visit. Patient acknowledged and verbally consented to continue with current treatment plan and was educated on the importance of compliance with treatment and follow-up appointments.        -Increase Lamictal to 25 mg twice daily for mood stabilization  -Continue Effexor  mg daily for depression/mood and anxiety  -Continue propanolol 10 mg twice daily as needed for anxiety/panic  -Continue psychotherapy with Kayla Frank LCSW  -Rule out bipolar/mood disorder        MEDICATION ISSUES: Discussed medication options and treatment plan of prescribed medication, any off label use of medication, as well as the risks, benefits, any black box warnings including increased suicidality, and side effects including but not limited to potential falls, dizziness, possible impaired driving, GI side effects (change in appetite, abdominal discomfort, nausea, vomiting, diarrhea, and/or constipation), dry mouth, somnolence, sedation, insomnia, activation, agitation, irritation, tremors, abnormal muscle movements or disorders, headache, sweating, possible bruising or rare bleeding, electrolyte and/or fluid abnormalities, change in blood pressure/heart rate/and or heart rhythm, sexual dysfunction, and metabolic adversities among others. Patient and/or guardian agreeable to call the office with any worsening of symptoms or onset of side effects, or if any concerns or questions arise.  The contact information for the office is made available to the patient and/or guardian.  Patient and/or guardian agreeable to call 911 or go to the nearest ER should they begin having any SI/HI, or if any urgent concerns arise. No medication side effects or related complaints today.    This APRN has discussed the benefits and risks of starting Lamictal (Lamotrigine).  The side effects of Lamictal can include a benign rash, blurred or double vision, dizziness, ataxia, sedation,  headache, tremor, insomnia, poor coordination, fatigue,  nausea, vomiting, dyspepsia, rhinitis, infection, pharyngitis, asthenia, a rare but serious rash, rare multi-organ failure associated with Torres-Sander Syndrome, toxic epidermal necrolysis, drug hypersensitivity syndrome, rare blood dyscrasias, rare aseptic meningitis, rare sudden unexplained deaths in people with epilepsy, withdrawal seizures upon abrupt withdrawal, and rare activation of suicidal ideation and behavior (suicidality).  This APRN has discussed with the patient that a very slow dose titration when starting Lamictal may reduce the incidence of skin rash and other side effects.  The dosage should not be titrated upwards or increased faster than recommended due to the possibility of the discussed side effects and risk of development of a skin rash (which can become life threatening).    This APRN has also discussed with the patient that if the patient stops taking the Lamictal for 5 days or longer, it will be necessary to restart the drug with the initial dose titration, as rashes have been reported on reexposure.    This APRN has also discussed with the patient that the patient should avoid new medications, foods, or products during the first 3 months of Lamictal treatment in order to decrease the risk of unrelated rash.  The patient should also not start Lamictal within 2 weeks of a viral infection, a rash, or a vaccination.  Also, if the patient and Provider decide to stop the Lamictal, the patient will follow the directions of this APRN/this office as a guided taper over about two weeks is appropriate due to the risk of relapse in bipolar disorder, the risk of seizures in those with epilepsy, and discontinuation symptoms upon rapid discontinuation of Lamictal.    The patient verbalizes understanding of benefits and risks as discussed, the patient feels the benefits outweigh the risks and is agreeable to start Lamictal via a slow titration  schedule as discussed.  The patient is advised should any side effects or rash develops they are to stop the Lamictal immediately and contact this APRN/this office or go to the emergency department immediately.  The patient verbalizes understanding and agreement with treatment plan in their own words.                        [facility] No Show Policy:  We understand unexpected circumstances arise; however, anytime you miss your appointment we are unable to provide you appropriate care.  In addition, each appointment missed could have been used to provide care for others.  We ask that you call at least 24 hours in advance to cancel or reschedule an appointment.  We would like to take this opportunity to remind you of our policy stating patients who miss THREE or more appointments without cancelling or rescheduling 24 hours in advance of the appointment may be subject to cancellation of any further visits with our clinic and recommendation to seek in-person services/visits.    Please call [phone] to reschedule your appointment. If there are reasons that make it difficult for you to keep the appointments, please call and let us know how we can help.  Please understand that medication prescribing will not continue without seeing your provider.      [facility]'s No Show Policy reviewed with patient at today's visit. Patient verbalized understanding of this policy. Discussed with patient that in the event that there are three or more no show visits, it will be recommended that they pursue in-person services/visits as noncompliance with telehealth visits indicates that patient is not an appropriate candidate for telemedicine and would likely be more appropriate for in-person services/visits. Patient verbalizes understanding and is agreeable to this.              MEDS ORDERED DURING VISIT:  New Medications Ordered This Visit   Medications   • venlafaxine XR (EFFEXOR-XR)  150 MG 24 hr capsule     Sig: Take 1 capsule by mouth Daily.     Dispense:  30 capsule     Refill:  0   • lamoTRIgine (LaMICtal) 25 MG tablet     Sig: Take 1 tablet by mouth 2 (Two) Times a Day.     Dispense:  60 tablet     Refill:  0       Return in about 3 weeks (around 12/7/2021), or if symptoms worsen or fail to improve, for Recheck.        Patient will follow-up in 3 weeks, highly encouraged the patient if she had any questions or concerns to contact the behavioral health [facility] clinic for sooner appointment patient verbalized understanding.      Functional Status: Moderate impairment     Prognosis: Guarded with Ongoing Treatment            This document has been electronically signed by LENA Barajas  November 16, 2021 08:57 EST    Part of this note may be an electronic transcription/translation of spoken language to printed text using the Dragon Dictation System.

## 2021-12-01 ENCOUNTER — TELEMEDICINE (OUTPATIENT)
Dept: PSYCHIATRY | Facility: CLINIC | Age: 26
End: 2021-12-01

## 2021-12-01 DIAGNOSIS — F41.1 GENERALIZED ANXIETY DISORDER: Primary | ICD-10-CM

## 2021-12-01 PROCEDURE — 90837 PSYTX W PT 60 MINUTES: CPT | Performed by: SOCIAL WORKER

## 2021-12-01 NOTE — PROGRESS NOTES
[facility]   Follow-up Progress Note     Date: December 3, 2021  Time In: 9:30 AM  Time Out: 10:30 AM      PROGRESS NOTE  Data:  Genesis Chapin is a 26 y.o. female presenting to [facility] for follow-up with Kayla Frank LCSW. The patient is seen remotely using [facility] My Chart. Patient is being seen via telehealth and stated they are in a secure environment for this session. The patient's condition being diagnosed/treated is appropriate for telemedicine. The provider identified herself as well as her credentials. The patient and/or patients guardian consent to be seen remotely, and when consent is given they understand that the consent allows for patient identifiable information to be sent to a third party as needed. They may refuse to be seen remotely at any time. The electronic data is encrypted and password protected, and the patient has been advised of the potential risks to privacy not withstanding such measures.    Today Patient checked in with how [facility] is doing. She reported significant anxiety and feelings of being overwhlemed. Patient and provider discussed cognitive behavioral strategies, patient identified thoughts that increased distress. Patient provider discussed relaxation skills and cognitive reframing. Patient provider will continue to stress management. Patient provider discussed beginning PTSD treatments in a group setting.  Clinical Maneuvering/Intervention: CBT    Assisted Patient in processing above session content; acknowledged and normalized patient’s thoughts, feelings, and concerns.  Rationalized patient thought process regarding parenting and distress tolerance.  Discussed triggers associated with patient's anxiety and PTSD.  Also discussed coping skills for patient to implement such as relaxation and distress tolerance.    Allowed Patient to freely discuss issues  without interruption or judgement with  unconditional positive regard, active listening skills, and empathy. Therapist provided a safe, confidential environment to facilitate the development of a positive therapeutic relationship and encouraged open, honest communication. Assisted Patient in identifying risk factors which would indicate the need for higher level of care including thoughts to harm self or others and/or self-harming behavior and encouraged Patient to contact this office, call 911, or present to the nearest emergency room should any of these events occur. Discussed crisis intervention services and means to access. Patient adamantly and convincingly denies current suicidal or homicidal ideation or perceptual disturbance. Assisted Patient in processing session content; acknowledged and normalized Patient’s thoughts, feelings, and concerns by utilizing a person-centered approach in efforts to build appropriate rapport and a positive therapeutic relationship with open and honest communication. Therapist utilized dialectical behavior techniques to teach and model emotional regulation and relaxation methods. Therapist assisted Patient with identifying and implementing healthier coping strategies.     Assessment   Patient appears to be maintaining relative stability as compared to baseline.  Patient continues to struggle with anxiety and PTSD.   As a result, they can be reasonably expected to continue to benefit from treatment and would likely be at increased risk for decompensation otherwise.    Mental Status Exam:   Hygiene:   good  Cooperation:  Cooperative  Eye Contact:  Good  Psychomotor Behavior:  Appropriate  Affect:  Appropriate  Mood: normal  Speech:  Normal  Thought Process:  Goal directed  Thought Content:  Normal  Suicidal:  None  Homicidal:  None  Hallucinations:  None  Delusion:  None  Memory:  Intact  Orientation:  Person, Place, Time and Situation  Reliability:  good  Insight:  Good  Judgement:  Good  Impulse Control:   Fair  Physical/Medical Issues:  No      PHQ-Score Total:  PHQ-9 Total Score:      JUANCARLOS-7:       Patient's Support Network Includes:      Functional Status: Moderate impairment     Progress toward goal: Not at goal    Prognosis: Good with Ongoing Treatment            Impression/Formulation:    VISIT DIAGNOSIS:     ICD-10-CM ICD-9-CM   1. Generalized anxiety disorder  F41.1 300.02        Patient appeared alert and oriented.  Patient is voluntarily requesting to continue outpatient therapy at [facility].  Patient is receptive to assistance with maintaining a stable lifestyle.  Patient presents with history of anxiety and PTSD.  Patient is agreeable to attend routine therapy sessions.  Patient expressed desire to maintain stability and participate in the therapeutic process.        Crisis Plan:  If symptoms/behaviors persist, Patient will present to the nearest hospital for an assessment. Advised patient of [facility] and assessment services.     Plan:   Patient will continue in individual outpatient therapy with focus on improved functioning and coping skills, maintaining stability, and avoiding decompensation and the need for higher level of care.    Patient will contact this office, call 911 or present to the nearest emergency room should suicidal or homicidal ideations occur. Provide Cognitive Behavioral Therapy and Solution Focused Therapy to improve functioning, maintain stability, and avoid decompensation and the need for higher level of care.     Return in about 2 weeks, or earlier if symptoms worsen or fail to improve.          This document has been electronically signed by Kayla Frank LCSW  December 3, 2021 12:25 EST        Part of this note may be an electronic transcription/translation of spoken language to printed text using the Dragon Dictation System.

## 2021-12-07 ENCOUNTER — TELEMEDICINE (OUTPATIENT)
Dept: PSYCHIATRY | Facility: CLINIC | Age: 26
End: 2021-12-07

## 2021-12-07 DIAGNOSIS — F41.0 PANIC DISORDER (EPISODIC PAROXYSMAL ANXIETY): ICD-10-CM

## 2021-12-07 DIAGNOSIS — F41.9 ANXIETY DISORDER, UNSPECIFIED TYPE: Chronic | ICD-10-CM

## 2021-12-07 DIAGNOSIS — R45.4 IRRITABILITY: ICD-10-CM

## 2021-12-07 DIAGNOSIS — R45.86 MOOD SWINGS: ICD-10-CM

## 2021-12-07 DIAGNOSIS — G47.9 SLEEPING DIFFICULTIES: ICD-10-CM

## 2021-12-07 DIAGNOSIS — F39 UNSPECIFIED MOOD (AFFECTIVE) DISORDER (HCC): Primary | Chronic | ICD-10-CM

## 2021-12-07 PROCEDURE — 99214 OFFICE O/P EST MOD 30 MIN: CPT | Performed by: NURSE PRACTITIONER

## 2021-12-07 RX ORDER — LAMOTRIGINE 25 MG/1
50 TABLET ORAL 2 TIMES DAILY
Qty: 60 TABLET | Refills: 0 | Status: SHIPPED | OUTPATIENT
Start: 2021-12-07 | End: 2021-12-28 | Stop reason: SDUPTHER

## 2021-12-07 RX ORDER — VENLAFAXINE HYDROCHLORIDE 150 MG/1
150 CAPSULE, EXTENDED RELEASE ORAL DAILY
Qty: 30 CAPSULE | Refills: 0 | Status: SHIPPED | OUTPATIENT
Start: 2021-12-07 | End: 2022-01-20 | Stop reason: SDUPTHER

## 2021-12-07 NOTE — PROGRESS NOTES
"This provider is located at the [facility] (through [facility]), [address], [place], [address] using a secure MobileProhart Video Visit through "[employer]. Patient is being seen remotely via telehealth at their home address in [place], and stated they are in a secure environment for this session. The patient's condition being diagnosed/treated is appropriate for telemedicine. The provider identified herself as well as her credentials. The patient, and/or patients guardian, consent to be seen remotely, and when consent is given they understand that the consent allows for patient identifiable information to be sent to a third party as needed. They may refuse to be seen remotely at any time. The electronic data is encrypted and password protected, and the patient and/or guardian has been advised of the potential risks to privacy not withstanding such measures.    You have chosen to receive care through a telehealth visit.  Do you consent to use a video/audio connection for your medical care today? Yes     Subjective   Genesis Chapin is a 26 y.o. female who is here today for medication management follow up.    Chief Complaint: Depression, anxiety, panic attacks, sleeping difficulties    History of Present Illness:  The patient describes her mood as \"about the same\" over the last few weeks.  Patient states that she has not noticed any significant changes since beginning the increased dose of Lamictal.  The patient states that she is still getting extremely overwhelmed and endorses that this leads to increase in depressive symptoms.  Patient states that she has a lot of anxiety and endorses that caused her to get overwhelmed very easily.  The patient states that she finds her self getting overwhelmed even by small minute tasks.  The patient states that symptoms have occurs it causes her to get down herself and feel hopeless and more depressed.  The patient states that she feels " as if she has very rapid thoughts and endorses that this causes her to become overwhelmed as well.  The patient endorses that she is still experiencing a lot of irritability whenever she is more anxious and overwhelmed.  The patient reports her appetite as good.  The patient reports her sleep as fluctuating.  The patient states that sleep has been poor over the past few days due to one of her children being sick.  The patient states that when her children are well and not needing her throughout the night she sleeps well.  The patient denies any nightmares or bad dreams.  The patient denies any new medical problems or changes in medications since last appointment with this facility.  The patient reports compliance with current medication regimen.  The patient denies any current side effects from her current medication regimen.  The patient denies any abnormal muscle movements or tics.  The patient rates her depression at a 5-6/10 on a 0-10 scale, with 10 being the worst.  The patient rates her anxiety at a 10/10 on a 0-10 scale, with 10 being the worst.  The patient rates hopelessness at a 3-4/10 on a 0-10 scale with 10 being the worst.  The patient would like to increase her medications at this visit.  The patient denies suicidal ideations and homicidal ideations, and is convincing.  The patient denies any auditory hallucinations or visual hallucinations.  The patient does not endorse significant symptoms consistent with a psychotic illness.                  LMP:  1 week ago.  The patient denies any chance of pregnancy at this time.  The patient was educated that her prescribed medications can have potential risk to a developing fetus. The patient is advised to contact this APRN/this office if she becomes pregnant or plans to become pregnant.  Pt verbalizes understanding and acknowledged agreement with this plan in her own words.      Past Psychiatric Medications:  Lexapro 10 mg daily - effective for depression, but  ineffective for anxiety   Klonopin 0.5 mg three times daily as needed for anxiety/panic  - effective but discontinued due to marijuana use  Hydroxyzine 25 mg three times daily as needed for anxiety/panic - ineffective      The following portions of the patient's history were reviewed and updated as appropriate: allergies, current medications, past family history, past medical history, past social history, past surgical history and problem list.    Review of Systems   Constitutional: Positive for fatigue. Negative for activity change, appetite change and unexpected weight change.   Psychiatric/Behavioral: Positive for agitation, decreased concentration, dysphoric mood and sleep disturbance. Negative for behavioral problems, confusion, hallucinations, self-injury and suicidal ideas. The patient is nervous/anxious. The patient is not hyperactive.        Objective   Physical Exam  Constitutional:       Appearance: Normal appearance.   Neurological:      Mental Status: She is alert.   Psychiatric:         Attention and Perception: Perception normal. She is inattentive.         Mood and Affect: Affect normal. Mood is anxious and depressed.         Speech: Speech normal.         Behavior: Behavior normal. Behavior is cooperative.         Thought Content: Thought content normal. Thought content does not include homicidal or suicidal ideation. Thought content does not include homicidal or suicidal plan.         Cognition and Memory: Cognition and memory normal.         Judgment: Judgment is impulsive.       not currently breastfeeding.    The patient was seen remotely today via a MyChart Video Visit through [facility].  Unable to obtain vital signs due to nature of remote visit.  Height stated at 67 inches.  Weight stated at 250 pounds.     Allergies   Allergen Reactions   • Amoxicillin Nausea And Vomiting   • Penicillins Nausea And Vomiting       No results found for this or any previous visit (from the past 2016  hour(s)).    Current Medications:   Current Outpatient Medications   Medication Sig Dispense Refill   • lamoTRIgine (LaMICtal) 25 MG tablet Take 2 tablets by mouth 2 (Two) Times a Day. 60 tablet 0   • propranolol (INDERAL) 10 MG tablet Take 1 tablet by mouth 2 (Two) Times a Day As Needed (anxiety/panic). 60 tablet 0   • traMADol (ULTRAM) 50 MG tablet Take 1 tablet by mouth Every 6 (Six) Hours As Needed for Moderate Pain . 60 tablet 2   • venlafaxine XR (EFFEXOR-XR) 150 MG 24 hr capsule Take 1 capsule by mouth Daily. 30 capsule 0     No current facility-administered medications for this visit.       Mental Status Exam:   Hygiene:   good  Cooperation:  Cooperative  Eye Contact:  Good  Psychomotor Behavior:  Appropriate  Affect:  Full range  Hopelessness: 4  Speech:  Normal  Thought Process:  Goal directed  Thought Content:  Normal  Suicidal:  None  Homicidal:  None  Hallucinations:  None  Delusion:  None  Memory:  Intact  Orientation:  Person, Place, Time and Situation  Reliability:  good  Insight:  Fair  Judgement:  Fair  Impulse Control:  Fair  Physical/Medical Issues:  Yes See medical history        Assessment/Plan   Diagnoses and all orders for this visit:    1. Unspecified mood (affective) disorder (HCC) (Primary)  -     venlafaxine XR (EFFEXOR-XR) 150 MG 24 hr capsule; Take 1 capsule by mouth Daily.  Dispense: 30 capsule; Refill: 0  -     lamoTRIgine (LaMICtal) 25 MG tablet; Take 2 tablets by mouth 2 (Two) Times a Day.  Dispense: 60 tablet; Refill: 0    2. Anxiety disorder, unspecified type  -     venlafaxine XR (EFFEXOR-XR) 150 MG 24 hr capsule; Take 1 capsule by mouth Daily.  Dispense: 30 capsule; Refill: 0  -     lamoTRIgine (LaMICtal) 25 MG tablet; Take 2 tablets by mouth 2 (Two) Times a Day.  Dispense: 60 tablet; Refill: 0    3. Panic disorder (episodic paroxysmal anxiety)  -     venlafaxine XR (EFFEXOR-XR) 150 MG 24 hr capsule; Take 1 capsule by mouth Daily.  Dispense: 30 capsule; Refill: 0    4. Mood  swings  -     lamoTRIgine (LaMICtal) 25 MG tablet; Take 2 tablets by mouth 2 (Two) Times a Day.  Dispense: 60 tablet; Refill: 0    5. Irritability  -     lamoTRIgine (LaMICtal) 25 MG tablet; Take 2 tablets by mouth 2 (Two) Times a Day.  Dispense: 60 tablet; Refill: 0    6. Sleeping difficulties            Visit Diagnoses:    ICD-10-CM ICD-9-CM   1. Unspecified mood (affective) disorder ([facility])  F39 296.90   2. Anxiety disorder, unspecified type  F41.9 300.00   3. Panic disorder (episodic paroxysmal anxiety)  F41.0 300.01   4. Mood swings  R45.86 799.24   5. Irritability  R45.4 799.22   6. Sleeping difficulties  G47.9 780.50       GOALS:  Short Term Goals: Patient will be compliant with medication, and patient will have no significant medication related side effects.  Patient will be engaged in psychotherapy as indicated.  Patient will report subjective improvement of symptoms.  Long term goals: To stabilize mood and treat/improve subjective symptoms, the patient will stay out of the hospital, the patient will be at an optimal level of functioning, and the patient will take all medications as prescribed.  The patient verbalized understanding and agreement with goals that were mutually set.      SUICIDE RISK ASSESSMENT: Unalterable demographics and a history of mental health intervention indicate this patient is in a high risk category compared to the general population. At present, the patient denies active SI/HI, intentions, or plans at this time and agrees to seek immediate care should such thoughts develop. The patient verbalizes understanding of how to access emergency care if needed and agrees to do so. Consideration of suicide risk and protective factors such as history, current presentation, individual strengths and weaknesses, psychosocial and environmental stressors and variables, psychiatric illness and symptoms, medical conditions and pain, took place in this interview. Based on those considerations, the  patient is determined: within individual baseline and presenting no imminent risk for suicide or homicide. Other recommendations: The patient does not meet the criteria for inpatient admission and is not a safety risk to self or others at today's visit. Inpatient treatment offers no significant advantages over outpatient treatment for this patient at today's visit.      SAFETY PLAN:  Patient was given ample time for questions and fully participated in treatment planning.  Patient was encouraged to call the clinic with any questions or concerns.  Patient was informed of access to emergency care. If patient were to develop any significant symptomatology, suicidal ideation, homicidal ideation, any concerns, or feel unsafe at any time they are to call the clinic and if unable to get immediate assistance should immediately call 911 or go to the nearest emergency room.  The patient is advised to remove or secure (lock away) all lethal weapons (including guns) and sharps (including razors, scissors, knives, etc.).  All medications (including any prescribed and any over the counter medications) should be stored in a safe and secured location that is not obtainable by children/adolescents.  Patient was given an opportunity and encouraged to ask questions about their medication, illness, and treatment. Patient contracted verbally for the following: If you are experiencing an emotional crisis or have thoughts of harming yourself or others, please go to your nearest local emergency room or call 911. Will continue to re-assess medication response and side effects frequently to establish efficacy and ensure safety. Risks, any black box warnings, side effects, off label usage, and benefits of medication and treatment discussed with patient, along with potential adverse side effects of current and/or newly prescribed medication, alternative treatment options, and OTC medications.  Patient verbalized understanding of potential  risks, any off label use of medication, any black box warnings, and any side effects in their own words. The patient verbalized understanding and agreed to comply with the safety plan discussed in their own words.  Patient given the number to the office. Number also available to the 24- hour suicide hotline.      TREATMENT PLAN/GOALS: Continue medications and treatment plan as indicated. Treatment and medication options discussed during today's visit. Patient acknowledged and verbally consented to continue with current treatment plan and was educated on the importance of compliance with treatment and follow-up appointments.        -Increase Lamictal to 50 mg twice daily for mood stabilization  -Continue Effexor  mg daily for depression/mood and anxiety  -Continue propanolol 10 mg twice daily as needed for anxiety/panic  -Continue psychotherapy with Kayla Frank LCSW  -Rule out bipolar/mood disorder        MEDICATION ISSUES: Discussed medication options and treatment plan of prescribed medication, any off label use of medication, as well as the risks, benefits, any black box warnings including increased suicidality, and side effects including but not limited to potential falls, dizziness, possible impaired driving, GI side effects (change in appetite, abdominal discomfort, nausea, vomiting, diarrhea, and/or constipation), dry mouth, somnolence, sedation, insomnia, activation, agitation, irritation, tremors, abnormal muscle movements or disorders, headache, sweating, possible bruising or rare bleeding, electrolyte and/or fluid abnormalities, change in blood pressure/heart rate/and or heart rhythm, sexual dysfunction, and metabolic adversities among others. Patient and/or guardian agreeable to call the office with any worsening of symptoms or onset of side effects, or if any concerns or questions arise.  The contact information for the office is made available to the patient and/or guardian.  Patient and/or  guardian agreeable to call 911 or go to the nearest ER should they begin having any SI/HI, or if any urgent concerns arise. No medication side effects or related complaints today.    This APRN has discussed the benefits and risks of starting Lamictal (Lamotrigine).  The side effects of Lamictal can include a benign rash, blurred or double vision, dizziness, ataxia, sedation, headache, tremor, insomnia, poor coordination, fatigue,  nausea, vomiting, dyspepsia, rhinitis, infection, pharyngitis, asthenia, a rare but serious rash, rare multi-organ failure associated with Torres-Sander Syndrome, toxic epidermal necrolysis, drug hypersensitivity syndrome, rare blood dyscrasias, rare aseptic meningitis, rare sudden unexplained deaths in people with epilepsy, withdrawal seizures upon abrupt withdrawal, and rare activation of suicidal ideation and behavior (suicidality).  This APRN has discussed with the patient that a very slow dose titration when starting Lamictal may reduce the incidence of skin rash and other side effects.  The dosage should not be titrated upwards or increased faster than recommended due to the possibility of the discussed side effects and risk of development of a skin rash (which can become life threatening).    This APRN has also discussed with the patient that if the patient stops taking the Lamictal for 5 days or longer, it will be necessary to restart the drug with the initial dose titration, as rashes have been reported on reexposure.    This APRN has also discussed with the patient that the patient should avoid new medications, foods, or products during the first 3 months of Lamictal treatment in order to decrease the risk of unrelated rash.  The patient should also not start Lamictal within 2 weeks of a viral infection, a rash, or a vaccination.  Also, if the patient and Provider decide to stop the Lamictal, the patient will follow the directions of this APRN/this office as a guided taper over  about two weeks is appropriate due to the risk of relapse in bipolar disorder, the risk of seizures in those with epilepsy, and discontinuation symptoms upon rapid discontinuation of Lamictal.    The patient verbalizes understanding of benefits and risks as discussed, the patient feels the benefits outweigh the risks and is agreeable to start Lamictal via a slow titration schedule as discussed.  The patient is advised should any side effects or rash develops they are to stop the Lamictal immediately and contact this APRN/this office or go to the emergency department immediately.  The patient verbalizes understanding and agreement with treatment plan in their own words.                        [facility] No Show Policy:  We understand unexpected circumstances arise; however, anytime you miss your appointment we are unable to provide you appropriate care.  In addition, each appointment missed could have been used to provide care for others.  We ask that you call at least 24 hours in advance to cancel or reschedule an appointment.  We would like to take this opportunity to remind you of our policy stating patients who miss THREE or more appointments without cancelling or rescheduling 24 hours in advance of the appointment may be subject to cancellation of any further visits with our clinic and recommendation to seek in-person services/visits.    Please call [phone] to reschedule your appointment. If there are reasons that make it difficult for you to keep the appointments, please call and let us know how we can help.  Please understand that medication prescribing will not continue without seeing your provider.      [facility]'s No Show Policy reviewed with patient at today's visit. Patient verbalized understanding of this policy. Discussed with patient that in the event that there are three or more no show visits, it will be recommended that they pursue in-person  services/visits as noncompliance with telehealth visits indicates that patient is not an appropriate candidate for telemedicine and would likely be more appropriate for in-person services/visits. Patient verbalizes understanding and is agreeable to this.              MEDS ORDERED DURING VISIT:  New Medications Ordered This Visit   Medications   • venlafaxine XR (EFFEXOR-XR) 150 MG 24 hr capsule     Sig: Take 1 capsule by mouth Daily.     Dispense:  30 capsule     Refill:  0   • lamoTRIgine (LaMICtal) 25 MG tablet     Sig: Take 2 tablets by mouth 2 (Two) Times a Day.     Dispense:  60 tablet     Refill:  0       Return in about 2 weeks (around 12/21/2021), or if symptoms worsen or fail to improve, for Recheck.        Patient will follow-up in 2 weeks, highly encouraged the patient if she had any questions or concerns to contact the behavioral health [facility] clinic for sooner appointment patient verbalized understanding.      Functional Status: Moderate impairment     Prognosis: Guarded with Ongoing Treatment            This document has been electronically signed by LENA Barajas  December 7, 2021 08:49 EST    Part of this note may be an electronic transcription/translation of spoken language to printed text using the Dragon Dict[employer] System.

## 2021-12-08 ENCOUNTER — TELEMEDICINE (OUTPATIENT)
Dept: PSYCHIATRY | Facility: CLINIC | Age: 26
End: 2021-12-08

## 2021-12-08 DIAGNOSIS — F41.1 GENERALIZED ANXIETY DISORDER: Primary | ICD-10-CM

## 2021-12-08 PROCEDURE — 90837 PSYTX W PT 60 MINUTES: CPT | Performed by: SOCIAL WORKER

## 2021-12-08 NOTE — PROGRESS NOTES
[facility]   Follow-up Progress Note     Date: December 8, 2021  Time In: 9:32AM  Time Out: 9:26AM      PROGRESS NOTE  Data:  Genesis Chapin is a 26 y.o. female presenting to [facility] for follow-up with Kayla Frank LCSW. The patient is seen remotely using [facility] My Chart. Patient is being seen via telehealth and stated they are in a secure environment for this session. The patient's condition being diagnosed/treated is appropriate for telemedicine. The provider identified herself as well as her credentials. The patient and/or patients guardian consent to be seen remotely, and when consent is given they understand that the consent allows for patient identifiable information to be sent to a third party as needed. They may refuse to be seen remotely at any time. The electronic data is encrypted and password protected, and the patient has been advised of the potential risks to privacy not withstanding such measures.    Today Patient presented for follow up appt. She checked in with how things have been going, she stated things have been more calm this week. She identified having some increase of anxiety with her son starting occupational therapy. Patient was able to connect thoughts and feelings and challenged irrational and unrealistic thoughts/fears. patient and provider discussed ways to improve self talk, self esteem, and difficulty with parenting/marraige issues. patient and provider explored solutions to improve communication.    Clinical Maneuvering/Intervention:CBT    Assisted Patient in processing above session content; acknowledged and normalized patient’s thoughts, feelings, and concerns.  Rationalized patient thought process regarding distress, family conflict.  Discussed triggers associated with patient's anxiety and ptsd.  Also discussed coping skills for patient to implement such as thought reframing, changing  communication styles.    Allowed Patient to freely discuss issues  without interruption or judgement with unconditional positive regard, active listening skills, and empathy. Therapist provided a safe, confidential environment to facilitate the development of a positive therapeutic relationship and encouraged open, honest communication. Assisted Patient in identifying risk factors which would indicate the need for higher level of care including thoughts to harm self or others and/or self-harming behavior and encouraged Patient to contact this office, call 911, or present to the nearest emergency room should any of these events occur. Discussed crisis intervention services and means to access. Patient adamantly and convincingly denies current suicidal or homicidal ideation or perceptual disturbance. Assisted Patient in processing session content; acknowledged and normalized Patient’s thoughts, feelings, and concerns by utilizing a person-centered approach in efforts to build appropriate rapport and a positive therapeutic relationship with open and honest communication. Therapist utilized dialectical behavior techniques to teach and model emotional regulation and relaxation methods. Therapist assisted Patient with identifying and implementing healthier coping strategies.     Assessment   Patient appears to be maintaining relative stability as compared to baseline.  Patient continues to struggle with anxiety, ptsd.   As a result, they can be reasonably expected to continue to benefit from treatment and would likely be at increased risk for decompensation otherwise.    Mental Status Exam:   Hygiene:   good  Cooperation:  Cooperative  Eye Contact:  Good  Psychomotor Behavior:  Appropriate  Affect:  Appropriate  Mood: normal  Speech:  Normal  Thought Process:  Goal directed  Thought Content:  Normal  Suicidal:  None  Homicidal:  None  Hallucinations:  None  Delusion:  None  Memory:  Intact  Orientation:  Person, Place,  Time and Situation  Reliability:  good  Insight:  Good  Judgement:  Good  Impulse Control:  Good  Physical/Medical Issues:  No      PHQ-Score Total:  PHQ-9 Total Score:      JUANCARLOS-7:       Patient's Support Network Includes:      Functional Status: Moderate impairment     Progress toward goal: Not at goal    Prognosis: Good with Ongoing Treatment            Impression/Formulation:    VISIT DIAGNOSIS:     ICD-10-CM ICD-9-CM   1. Generalized anxiety disorder  F41.1 300.02        Patient appeared alert and oriented.  Patient is voluntarily requesting to continue outpatient therapy at [facility].  Patient is receptive to assistance with maintaining a stable lifestyle.  Patient presents with history of anxiety, ptsd.  Patient is agreeable to attend routine therapy sessions.  Patient expressed desire to maintain stability and participate in the therapeutic process.        Crisis Plan:  If symptoms/behaviors persist, Patient will present to the nearest hospital for an assessment. Advised patient of [facility] ER and assessment services.     Plan:   Patient will continue in individual outpatient therapy with focus on improved functioning and coping skills, maintaining stability, and avoiding decompensation and the need for higher level of care.    Patient will contact this office, call 911 or present to the nearest emergency room should suicidal or homicidal ideations occur. Provide Cognitive Behavioral Therapy and Solution Focused Therapy to improve functioning, maintain stability, and avoid decompensation and the need for higher level of care.     Return in about 1 weeks, or earlier if symptoms worsen or fail to improve.      This document has been electronically signed by Kayla Frank LCSW  December 8, 2021 10:25 EST        Part of this note may be an electronic transcription/translation of spoken language to printed text using the Dragon Dictation System.

## 2021-12-13 ENCOUNTER — TELEPHONE (OUTPATIENT)
Dept: PSYCHIATRY | Facility: CLINIC | Age: 26
End: 2021-12-13

## 2021-12-13 DIAGNOSIS — F41.9 ANXIETY DISORDER, UNSPECIFIED TYPE: Primary | ICD-10-CM

## 2021-12-13 DIAGNOSIS — F41.0 PANIC DISORDER (EPISODIC PAROXYSMAL ANXIETY): ICD-10-CM

## 2021-12-13 DIAGNOSIS — F39 UNSPECIFIED MOOD (AFFECTIVE) DISORDER (HCC): ICD-10-CM

## 2021-12-13 RX ORDER — BUSPIRONE HYDROCHLORIDE 7.5 MG/1
7.5 TABLET ORAL 2 TIMES DAILY
Qty: 60 TABLET | Refills: 0 | Status: SHIPPED | OUTPATIENT
Start: 2021-12-13 | End: 2022-01-11 | Stop reason: SDUPTHER

## 2021-12-13 NOTE — TELEPHONE ENCOUNTER
Patient called and states that she has talked with you about Buspiron and she would like to go ahead and start it if possible.  She states her next appointment is December 23 and that is too close to [place] for her to start a new medication.  She would like to go ahead and start it.  Please advise

## 2021-12-15 ENCOUNTER — TELEMEDICINE (OUTPATIENT)
Dept: PSYCHIATRY | Facility: CLINIC | Age: 26
End: 2021-12-15

## 2021-12-15 DIAGNOSIS — F41.1 GENERALIZED ANXIETY DISORDER: Primary | ICD-10-CM

## 2021-12-15 PROCEDURE — 90837 PSYTX W PT 60 MINUTES: CPT | Performed by: SOCIAL WORKER

## 2021-12-15 NOTE — PROGRESS NOTES
[facility]   Follow-up Progress Note     Date: December 20, 2021  Time In: 9:31AM  Time Out: 9:26 AM      PROGRESS NOTE  Data:  Genesis Chapin is a 26 y.o. female presenting to [facility] for follow-up with Kayla Frank LCSW. The patient is seen remotely using [facility] My Chart. Patient is being seen via telehealth and stated they are in a secure environment for this session. The patient's condition being diagnosed/treated is appropriate for telemedicine. The provider identified herself as well as her credentials. The patient and/or patients guardian consent to be seen remotely, and when consent is given they understand that the consent allows for patient identifiable information to be sent to a third party as needed. They may refuse to be seen remotely at any time. The electronic data is encrypted and password protected, and the patient has been advised of the potential risks to privacy not withstanding such measures.    Today Patient presented for follow up and checked in with how things have been.  Patient discussed needing to cancel her son's occupational therapy appointment again due to illness.  Patient processed and discussed recent stressors with holidays.  Patient and provider explored emotional regulation, conflict management and communication.  Patient and provider identified emotional regulation skills to utilize to improve functioning.     Clinical Maneuvering/Intervention: CBT    Assisted Patient in processing above session content; acknowledged and normalized patient’s thoughts, feelings, and concerns.  Rationalized patient thought process regarding parenting and relationships.  Discussed triggers associated with patient's emotional dysregulation.  Also discussed coping skills for patient to implement such as deep breathing, relaxation, changing communication styles.    Allowed Patient to freely discuss issues   without interruption or judgement with unconditional positive regard, active listening skills, and empathy. Therapist provided a safe, confidential environment to facilitate the development of a positive therapeutic relationship and encouraged open, honest communication. Assisted Patient in identifying risk factors which would indicate the need for higher level of care including thoughts to harm self or others and/or self-harming behavior and encouraged Patient to contact this office, call 911, or present to the nearest emergency room should any of these events occur. Discussed crisis intervention services and means to access. Patient adamantly and convincingly denies current suicidal or homicidal ideation or perceptual disturbance. Assisted Patient in processing session content; acknowledged and normalized Patient’s thoughts, feelings, and concerns by utilizing a person-centered approach in efforts to build appropriate rapport and a positive therapeutic relationship with open and honest communication. Therapist utilized dialectical behavior techniques to teach and model emotional regulation and relaxation methods. Therapist assisted Patient with identifying and implementing healthier coping strategies.     Assessment   Patient appears to be maintaining relative stability as compared to baseline.  Patient continues to struggle with emotional dysregulation.   As a result, they can be reasonably expected to continue to benefit from treatment and would likely be at increased risk for decompensation otherwise.    Mental Status Exam:   Hygiene:   good  Cooperation:  Cooperative  Eye Contact:  Good  Psychomotor Behavior:  Appropriate  Affect:  Appropriate  Mood: normal  Speech:  Normal  Thought Process:  Goal directed  Thought Content:  Normal  Suicidal:  None  Homicidal:  None  Hallucinations:  None  Delusion:  None  Memory:  Intact  Orientation:  Person, Place, Time and Situation  Reliability:  good  Insight:   Good  Judgement:  Good  Impulse Control:  Good  Physical/Medical Issues:  No      PHQ-Score Total:  PHQ-9 Total Score:      JUANCARLOS-7:       Patient's Support Network Includes:      Functional Status: Moderate impairment     Progress toward goal: Not at goal    Prognosis: Good with Ongoing Treatment            Impression/Formulation:    VISIT DIAGNOSIS:     ICD-10-CM ICD-9-CM   1. Generalized anxiety disorder  F41.1 300.02        Patient appeared alert and oriented.  Patient is voluntarily requesting to continue outpatient therapy at [facility].  Patient is receptive to assistance with maintaining a stable lifestyle.  Patient presents with history of anxiety.  Patient is agreeable to attend routine therapy sessions.  Patient expressed desire to maintain stability and participate in the therapeutic process.        Crisis Plan:  If symptoms/behaviors persist, Patient will present to the nearest hospital for an assessment. Advised patient of [facility] ER and assessment services.     Plan:   Patient will continue in individual outpatient therapy with focus on improved functioning and coping skills, maintaining stability, and avoiding decompensation and the need for higher level of care.    Patient will contact this office, call 911 or present to the nearest emergency room should suicidal or homicidal ideations occur. Provide Cognitive Behavioral Therapy and Solution Focused Therapy to improve functioning, maintain stability, and avoid decompensation and the need for higher level of care.     Return in about 2 weeks, or earlier if symptoms worsen or fail to improve.        This document has been electronically signed by Kayla Frank LCSW  December 20, 2021 10:53 EST        Part of this note may be an electronic transcription/translation of spoken language to printed text using the Dragon Dictation System.

## 2021-12-22 ENCOUNTER — TELEMEDICINE (OUTPATIENT)
Dept: PSYCHIATRY | Facility: CLINIC | Age: 26
End: 2021-12-22

## 2021-12-22 DIAGNOSIS — F41.1 GENERALIZED ANXIETY DISORDER: Primary | ICD-10-CM

## 2021-12-22 PROCEDURE — 90837 PSYTX W PT 60 MINUTES: CPT | Performed by: SOCIAL WORKER

## 2021-12-22 NOTE — PROGRESS NOTES
[facility]   Follow-up Progress Note     Date: December 23, 2021  Time In: 9:37AM  Time Out: 10:32 AM      PROGRESS NOTE  Data:  Genesis Chapin is a 26 y.o. female presenting to [facility] for follow-up with Kayla Frank LCSW. The patient is seen remotely using [facility] My Chart. Patient is being seen via telehealth and stated they are in a secure environment for this session. The patient's condition being diagnosed/treated is appropriate for telemedicine. The provider identified herself as well as her credentials. The patient and/or patients guardian consent to be seen remotely, and when consent is given they understand that the consent allows for patient identifiable information to be sent to a third party as needed. They may refuse to be seen remotely at any time. The electronic data is encrypted and password protected, and the patient has been advised of the potential risks to privacy not withstanding such measures.    Today Patient presented for follow-up appointment and checked in with how things have been.  Patient's mood has been relatively stable.  Patient and provider explored possible family conflict during the holidays.  Patient and provider explored ways to regulate emotions and improve communication.  Patient and provider strategized ways to respond to family conflict.    Clinical Maneuvering/Intervention: CBT    Assisted Patient in processing above session content; acknowledged and normalized patient’s thoughts, feelings, and concerns.  Rationalized patient thought process regarding family.  Discussed triggers associated with patient's anxiety and mood instability.  Also discussed coping skills for patient to implement such as relaxation, deep breathing, taking a break, emotional regulation, cognitive reframing.    Allowed Patient to freely discuss issues  without interruption or judgement with unconditional positive  regard, active listening skills, and empathy. Therapist provided a safe, confidential environment to facilitate the development of a positive therapeutic relationship and encouraged open, honest communication. Assisted Patient in identifying risk factors which would indicate the need for higher level of care including thoughts to harm self or others and/or self-harming behavior and encouraged Patient to contact this office, call 911, or present to the nearest emergency room should any of these events occur. Discussed crisis intervention services and means to access. Patient adamantly and convincingly denies current suicidal or homicidal ideation or perceptual disturbance. Assisted Patient in processing session content; acknowledged and normalized Patient’s thoughts, feelings, and concerns by utilizing a person-centered approach in efforts to build appropriate rapport and a positive therapeutic relationship with open and honest communication. Therapist utilized dialectical behavior techniques to teach and model emotional regulation and relaxation methods. Therapist assisted Patient with identifying and implementing healthier coping strategies.     Assessment   Patient appears to be maintaining relative stability as compared to baseline.  Patient continues to struggle with anxiety.   As a result, they can be reasonably expected to continue to benefit from treatment and would likely be at increased risk for decompensation otherwise.    Mental Status Exam:   Hygiene:   good  Cooperation:  Cooperative  Eye Contact:  Good  Psychomotor Behavior:  Appropriate  Affect:  Full range  Mood: normal  Speech:  Normal  Thought Process:  Goal directed  Thought Content:  Normal  Suicidal:  None  Homicidal:  None  Hallucinations:  None  Delusion:  None  Memory:  Intact  Orientation:  Person, Place, Time and Situation  Reliability:  good  Insight:  Good  Judgement:  Good  Impulse Control:  Fair  Physical/Medical Issues:  No       PHQ-Score Total:  PHQ-9 Total Score:      JUANCARLOS-7:       Patient's Support Network Includes:      Functional Status: Moderate impairment     Progress toward goal: Not at goal    Prognosis: Good with Ongoing Treatment            Impression/Formulation:    VISIT DIAGNOSIS:     ICD-10-CM ICD-9-CM   1. Generalized anxiety disorder  F41.1 300.02        Patient appeared alert and oriented.  Patient is voluntarily requesting to continue outpatient therapy at [facility].  Patient is receptive to assistance with maintaining a stable lifestyle.  Patient presents with history of anxiety, trauma, mood instability.  Patient is agreeable to attend routine therapy sessions.  Patient expressed desire to maintain stability and participate in the therapeutic process.        Crisis Plan:  If symptoms/behaviors persist, Patient will present to the nearest hospital for an assessment. Advised patient of [facility] ER and assessment services.     Plan:   Patient will continue in individual outpatient therapy with focus on improved functioning and coping skills, maintaining stability, and avoiding decompensation and the need for higher level of care.    Patient will contact this office, call 911 or present to the nearest emergency room should suicidal or homicidal ideations occur. Provide Cognitive Behavioral Therapy and Solution Focused Therapy to improve functioning, maintain stability, and avoid decompensation and the need for higher level of care.     Return in about 1 weeks, or earlier if symptoms worsen or fail to improve.        This document has been electronically signed by Kayla Frank LCSW  December 23, 2021 15:44 EST        Part of this note may be an electronic transcription/translation of spoken language to printed text using the Dragon Dictation System.

## 2021-12-27 ENCOUNTER — TELEMEDICINE (OUTPATIENT)
Dept: PSYCHIATRY | Facility: CLINIC | Age: 26
End: 2021-12-27

## 2021-12-27 DIAGNOSIS — F41.1 GENERALIZED ANXIETY DISORDER: Primary | ICD-10-CM

## 2021-12-27 PROCEDURE — 90837 PSYTX W PT 60 MINUTES: CPT | Performed by: SOCIAL WORKER

## 2021-12-27 NOTE — PROGRESS NOTES
[facility]   Follow-up Progress Note     Date: December 27, 2021  Time In: 8:32AM  Time Out: 9:33 AM      PROGRESS NOTE  Data:  Genesis Chapin is a 26 y.o. female presenting to [facility] for follow-up with Kayla Frank LCSW. The patient is seen remotely using [facility] My Chart. Patient is being seen via telehealth and stated they are in a secure environment for this session. The patient's condition being diagnosed/treated is appropriate for telemedicine. The provider identified herself as well as her credentials. The patient and/or patients guardian consent to be seen remotely, and when consent is given they understand that the consent allows for patient identifiable information to be sent to a third party as needed. They may refuse to be seen remotely at any time. The electronic data is encrypted and password protected, and the patient has been advised of the potential risks to privacy not withstanding such measures.    Today Patient presented for follow up appt and checked in with how things have been. She processed thoughts and feelings related to stress over the holidays.  Patient talked through specific scenarios and identified other possible outcomes.  Patient identified her own triggers to emotional reactions.  Provider and patient discussed normal childhood behavior versus what could be concerning.  Patient and provider discussed ways to cope with difficult parenting scenarios.  Patient identified her difficulty and always making decisions when she is trying to protect her children.  Patient and provider discussed ways to differentiate her own emotions from what the actual situation is.      Clinical Maneuvering/Intervention: CBT    Assisted Patient in processing above session content; acknowledged and normalized patient’s thoughts, feelings, and concerns.  Rationalized patient thought process regarding family and emotional  dysregulation.  Discussed triggers associated with patient's anxiety.  Also discussed coping skills for patient to implement such as relaxation and cognitive reframing.    Allowed Patient to freely discuss issues  without interruption or judgement with unconditional positive regard, active listening skills, and empathy. Therapist provided a safe, confidential environment to facilitate the development of a positive therapeutic relationship and encouraged open, honest communication. Assisted Patient in identifying risk factors which would indicate the need for higher level of care including thoughts to harm self or others and/or self-harming behavior and encouraged Patient to contact this office, call 911, or present to the nearest emergency room should any of these events occur. Discussed crisis intervention services and means to access. Patient adamantly and convincingly denies current suicidal or homicidal ideation or perceptual disturbance. Assisted Patient in processing session content; acknowledged and normalized Patient’s thoughts, feelings, and concerns by utilizing a person-centered approach in efforts to build appropriate rapport and a positive therapeutic relationship with open and honest communication. Therapist utilized dialectical behavior techniques to teach and model emotional regulation and relaxation methods. Therapist assisted Patient with identifying and implementing healthier coping strategies.     Assessment   Patient appears to be maintaining relative stability as compared to baseline.  Patient continues to struggle with anxiety and PTSD.   As a result, they can be reasonably expected to continue to benefit from treatment and would likely be at increased risk for decompensation otherwise.    Mental Status Exam:   Hygiene:   good  Cooperation:  Cooperative  Eye Contact:  Good  Psychomotor Behavior:  Appropriate  Affect:  Appropriate  Mood: normal  Speech:  Normal  Thought Process:  Goal  directed  Thought Content:  Normal  Suicidal:  None  Homicidal:  None  Hallucinations:  None  Delusion:  None  Memory:  Intact  Orientation:  Person, Place, Time and Situation  Reliability:  good  Insight:  Good  Judgement:  Good  Impulse Control:  Good  Physical/Medical Issues:  No      PHQ-Score Total:  PHQ-9 Total Score:      JUANCARLOS-7:       Patient's Support Network Includes:      Functional Status: Moderate impairment     Progress toward goal: Not at goal    Prognosis: Good with Ongoing Treatment            Impression/Formulation:    VISIT DIAGNOSIS:     ICD-10-CM ICD-9-CM   1. Generalized anxiety disorder  F41.1 300.02        Patient appeared alert and oriented.  Patient is voluntarily requesting to continue outpatient therapy at [facility].  Patient is receptive to assistance with maintaining a stable lifestyle.  Patient presents with history of anxiety and PTSD.  Patient is agreeable to attend routine therapy sessions.  Patient expressed desire to maintain stability and participate in the therapeutic process.        Crisis Plan:  If symptoms/behaviors persist, Patient will present to the nearest hospital for an assessment. Advised patient of [facility] ER and assessment services.     Plan:   Patient will continue in individual outpatient therapy with focus on improved functioning and coping skills, maintaining stability, and avoiding decompensation and the need for higher level of care.    Patient will contact this office, call 911 or present to the nearest emergency room should suicidal or homicidal ideations occur. Provide Cognitive Behavioral Therapy and Solution Focused Therapy to improve functioning, maintain stability, and avoid decompensation and the need for higher level of care.     Return in about 1 weeks, or earlier if symptoms worsen or fail to improve.    Recommended Referrals: none        This document has been electronically signed by Kayla Frank  LCSW  December 27, 2021 12:05 EST        Part of this note may be an electronic transcription/translation of spoken language to printed text using the Dragon Dictation System.

## 2021-12-28 DIAGNOSIS — R45.4 IRRITABILITY: ICD-10-CM

## 2021-12-28 DIAGNOSIS — F41.9 ANXIETY DISORDER, UNSPECIFIED TYPE: Chronic | ICD-10-CM

## 2021-12-28 DIAGNOSIS — F39 UNSPECIFIED MOOD (AFFECTIVE) DISORDER (HCC): Chronic | ICD-10-CM

## 2021-12-28 DIAGNOSIS — R45.86 MOOD SWINGS: ICD-10-CM

## 2021-12-28 RX ORDER — LAMOTRIGINE 25 MG/1
50 TABLET ORAL 2 TIMES DAILY
Qty: 120 TABLET | Refills: 0 | Status: SHIPPED | OUTPATIENT
Start: 2021-12-28 | End: 2022-01-25 | Stop reason: SDUPTHER

## 2021-12-28 NOTE — TELEPHONE ENCOUNTER
Patient had an appointment for 12/23/21 cancelled by provider, rescheduled but needs refills please.    Thank You     (Provider Out Of Office Today)    Lamotrigine she takes 4 a day and will be out by end of week but needs filed before holiday weekend

## 2022-01-06 ENCOUNTER — TELEMEDICINE (OUTPATIENT)
Dept: PSYCHIATRY | Facility: CLINIC | Age: 27
End: 2022-01-06

## 2022-01-06 DIAGNOSIS — F41.1 GENERALIZED ANXIETY DISORDER: Primary | ICD-10-CM

## 2022-01-06 PROCEDURE — 90837 PSYTX W PT 60 MINUTES: CPT | Performed by: SOCIAL WORKER
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
## 2022-01-11 DIAGNOSIS — F41.9 ANXIETY DISORDER, UNSPECIFIED TYPE: ICD-10-CM

## 2022-01-11 DIAGNOSIS — F39 UNSPECIFIED MOOD (AFFECTIVE) DISORDER: ICD-10-CM

## 2022-01-11 DIAGNOSIS — F41.0 PANIC DISORDER (EPISODIC PAROXYSMAL ANXIETY): ICD-10-CM

## 2022-01-11 RX ORDER — BUSPIRONE HYDROCHLORIDE 7.5 MG/1
7.5 TABLET ORAL 2 TIMES DAILY
Qty: 60 TABLET | Refills: 0 | Status: SHIPPED | OUTPATIENT
Start: 2022-01-11 | End: 2022-01-25 | Stop reason: SDUPTHER

## 2022-01-12 ENCOUNTER — TELEMEDICINE (OUTPATIENT)
Dept: PSYCHIATRY | Facility: CLINIC | Age: 27
End: 2022-01-12

## 2022-01-12 DIAGNOSIS — F43.10 POST TRAUMATIC STRESS DISORDER (PTSD): Primary | ICD-10-CM

## 2022-01-12 PROCEDURE — 90837 PSYTX W PT 60 MINUTES: CPT | Performed by: SOCIAL WORKER

## 2022-01-12 NOTE — PROGRESS NOTES
[facility]   Follow-up Progress Note     Date: January 25, 2022  Time In: 9:40 AM  Time Out: 10:33 AM      PROGRESS NOTE  Data:  Genesis Cahpin is a 26 y.o. female presenting to [facility] for follow-up with Kayla Frank LCSW. The patient is seen remotely using [facility] My Chart. Patient is being seen via telehealth and stated they are in a secure environment for this session. The patient's condition being diagnosed/treated is appropriate for telemedicine. The provider identified herself as well as her credentials. The patient and/or patients guardian consent to be seen remotely, and when consent is given they understand that the consent allows for patient identifiable information to be sent to a third party as needed. They may refuse to be seen remotely at any time. The electronic data is encrypted and password protected, and the patient has been advised of the potential risks to privacy not withstanding such measures.    Today Patient presented for follow-up appointment.  She completed PCL 5 with a score of 48.  Patient provider began CBT session 1.  Provider began psychoeducation regarding PTSD diagnosis, cognitive theory, and the role of emotions and recovery versus nonrecovery.  Patient had good insight into her PTSD symptoms. Patient was assigned impact statement    Clinical Maneuvering/Intervention: Cognitive processing therapy    Assisted Patient in processing above session content; acknowledged and normalized patient’s thoughts, feelings, and concerns.  Rationalized patient thought process regarding her own PTSD symptoms.  Discussed triggers associated with patient's PTSD.  Also discussed coping skills for patient to implement such as cognitive reprocessing.    Allowed Patient to freely discuss issues  without interruption or judgement with unconditional positive regard, active listening skills, and empathy. Therapist  provided a safe, confidential environment to facilitate the development of a positive therapeutic relationship and encouraged open, honest communication. Assisted Patient in identifying risk factors which would indicate the need for higher level of care including thoughts to harm self or others and/or self-harming behavior and encouraged Patient to contact this office, call 911, or present to the nearest emergency room should any of these events occur. Discussed crisis intervention services and means to access. Patient adamantly and convincingly denies current suicidal or homicidal ideation or perceptual disturbance. Assisted Patient in processing session content; acknowledged and normalized Patient’s thoughts, feelings, and concerns by utilizing a person-centered approach in efforts to build appropriate rapport and a positive therapeutic relationship with open and honest communication. Therapist utilized dialectical behavior techniques to teach and model emotional regulation and relaxation methods. Therapist assisted Patient with identifying and implementing healthier coping strategies.     Assessment   Patient appears to be maintaining relative stability as compared to baseline.  Patient continues to struggle with PTSD.   As a result, they can be reasonably expected to continue to benefit from treatment and would likely be at increased risk for decompensation otherwise.    Mental Status Exam:   Hygiene:   good  Cooperation:  Cooperative  Eye Contact:  Good  Psychomotor Behavior:  Appropriate  Affect:  Appropriate  Mood: normal  Speech:  Normal  Thought Process:  Goal directed  Thought Content:  Normal  Suicidal:  None  Homicidal:  None  Hallucinations:  None  Delusion:  None  Memory:  Intact  Orientation:  Person, Place, Time and Situation  Reliability:  good  Insight:  Good  Judgement:  Good  Impulse Control:  Good  Physical/Medical Issues:  No      PHQ-Score Total:  PHQ-9 Total Score:      JUANCARLOS-7:       Patient's  Support Network Includes:      Functional Status: Moderate impairment     Progress toward goal: Not at goal    Prognosis: Good with Ongoing Treatment            Impression/Formulation:    VISIT DIAGNOSIS:     ICD-10-CM ICD-9-CM   1. Post traumatic stress disorder (PTSD)  F43.10 309.81        Patient appeared alert and oriented.  Patient is voluntarily requesting to continue outpatient therapy at [facility].  Patient is receptive to assistance with maintaining a stable lifestyle.  Patient presents with history of PTSD.  Patient is agreeable to attend routine therapy sessions.  Patient expressed desire to maintain stability and participate in the therapeutic process.        Crisis Plan:  If symptoms/behaviors persist, Patient will present to the nearest hospital for an assessment. Advised patient of [facility] ER and assessment services.     Plan:   Patient will continue in individual outpatient therapy with focus on improved functioning and coping skills, maintaining stability, and avoiding decompensation and the need for higher level of care.    Patient will contact this office, call 911 or present to the nearest emergency room should suicidal or homicidal ideations occur. Provide Cognitive Behavioral Therapy and Solution Focused Therapy to improve functioning, maintain stability, and avoid decompensation and the need for higher level of care.     Return in about 1 weeks, or earlier if symptoms worsen or fail to improve.    Recommended Referrals: None        This document has been electronically signed by Kayla Frank LCSW  January 25, 2022 14:49 EST        Part of this note may be an electronic transcription/translation of spoken language to printed text using the Dragon Dictation System.

## 2022-01-20 ENCOUNTER — TELEMEDICINE (OUTPATIENT)
Dept: PSYCHIATRY | Facility: CLINIC | Age: 27
End: 2022-01-20

## 2022-01-20 DIAGNOSIS — F41.9 ANXIETY DISORDER, UNSPECIFIED TYPE: Chronic | ICD-10-CM

## 2022-01-20 DIAGNOSIS — F41.0 PANIC DISORDER (EPISODIC PAROXYSMAL ANXIETY): ICD-10-CM

## 2022-01-20 DIAGNOSIS — F43.10 POST TRAUMATIC STRESS DISORDER (PTSD): Primary | ICD-10-CM

## 2022-01-20 DIAGNOSIS — F39 UNSPECIFIED MOOD (AFFECTIVE) DISORDER: Chronic | ICD-10-CM

## 2022-01-20 PROCEDURE — 90837 PSYTX W PT 60 MINUTES: CPT | Performed by: SOCIAL WORKER

## 2022-01-20 RX ORDER — VENLAFAXINE HYDROCHLORIDE 150 MG/1
150 CAPSULE, EXTENDED RELEASE ORAL DAILY
Qty: 30 CAPSULE | Refills: 0 | Status: SHIPPED | OUTPATIENT
Start: 2022-01-20 | End: 2022-02-21 | Stop reason: SDUPTHER

## 2022-01-20 NOTE — TELEPHONE ENCOUNTER
Patient has an appointment on 1/25/2022 but is out of her effexor xr..  Patient would like for it to be called in.  Please advise.

## 2022-01-20 NOTE — PROGRESS NOTES
[facility]   Follow-up Progress Note     Date: January 30, 2022  Time In: 8:31AM  Time Out: 9:32 AM      PROGRESS NOTE  Data:  Genesis Chapin is a 26 y.o. female presenting to [facility] for follow-up with Kayla Frank LCSW. The patient is seen remotely using [facility] My Chart. Patient is being seen via telehealth and stated they are in a secure environment for this session. Patient is located at home.The patient's condition being diagnosed/treated is appropriate for telemedicine. The provider identified herself as well as her credentials. The patient and/or patients guardian consent to be seen remotely, and when consent is given they understand that the consent allows for patient identifiable information to be sent to a third party as needed. They may refuse to be seen remotely at any time. The electronic data is encrypted and password protected, and the patient has been advised of the potential risks to privacy not withstanding such measures.    Today Patient presented for follow up and continued cognitive processing therapy.  She completed PCL 5 with a score 53.  Patient and provider reviewed ABC sheets.  Continue to identify stuck points.  Continue to utilize Socratic dialogue to explore and challenge stuck points.  Patient and provider reviewed next practice assignment.  Clinical Maneuvering/Intervention: CPT    Assisted Patient in processing above session content; acknowledged and normalized patient’s thoughts, feelings, and concerns.  Rationalized patient thought process regarding stuck points.  Discussed triggers associated with patient's PTSD.  Also discussed coping skills for patient to implement such as cognitive reprocessing.    Allowed Patient to freely discuss issues  without interruption or judgement with unconditional positive regard, active listening skills, and empathy. Therapist provided a safe, confidential  environment to facilitate the development of a positive therapeutic relationship and encouraged open, honest communication. Assisted Patient in identifying risk factors which would indicate the need for higher level of care including thoughts to harm self or others and/or self-harming behavior and encouraged Patient to contact this office, call 911, or present to the nearest emergency room should any of these events occur. Discussed crisis intervention services and means to access. Patient adamantly and convincingly denies current suicidal or homicidal ideation or perceptual disturbance. Assisted Patient in processing session content; acknowledged and normalized Patient’s thoughts, feelings, and concerns by utilizing a person-centered approach in efforts to build appropriate rapport and a positive therapeutic relationship with open and honest communication. Therapist utilized dialectical behavior techniques to teach and model emotional regulation and relaxation methods. Therapist assisted Patient with identifying and implementing healthier coping strategies.     Assessment   Patient appears to be maintaining relative stability as compared to baseline.  Patient continues to struggle with PTSD.   As a result, they can be reasonably expected to continue to benefit from treatment and would likely be at increased risk for decompensation otherwise.    Mental Status Exam:   Hygiene:   good  Cooperation:  Cooperative  Eye Contact:  Good  Psychomotor Behavior:  Appropriate  Affect:  Appropriate  Mood: normal  Speech:  Normal  Thought Process:  Goal directed  Thought Content:  Normal  Suicidal:  None  Homicidal:  None  Hallucinations:  None  Delusion:  None  Memory:  Intact  Orientation:  Person, Place, Time and Situation  Reliability:  good  Insight:  Good  Judgement:  Good  Impulse Control:  Good  Physical/Medical Issues:  No      PHQ-Score Total:  PHQ-9 Total Score:      JUANCARLOS-7:       Patient's Support Network Includes:       Functional Status: Moderate impairment     Progress toward goal: Not at goal    Prognosis: Good with Ongoing Treatment            Impression/Formulation:    VISIT DIAGNOSIS:     ICD-10-CM ICD-9-CM   1. Post traumatic stress disorder (PTSD)  F43.10 309.81        Patient appeared alert and oriented.  Patient is voluntarily requesting to continue outpatient therapy at [facility].  Patient is receptive to assistance with maintaining a stable lifestyle.  Patient presents with history of PTSD.  Patient is agreeable to attend routine therapy sessions.  Patient expressed desire to maintain stability and participate in the therapeutic process.        Crisis Plan:  If symptoms/behaviors persist, Patient will present to the nearest hospital for an assessment. Advised patient of [facility] ER and assessment services.     Plan:   Patient will continue in individual outpatient therapy with focus on improved functioning and coping skills, maintaining stability, and avoiding decompensation and the need for higher level of care.    Patient will contact this office, call 911 or present to the nearest emergency room should suicidal or homicidal ideations occur. Provide Cognitive Behavioral Therapy and Solution Focused Therapy to improve functioning, maintain stability, and avoid decompensation and the need for higher level of care.     Return in about 1 weeks, or earlier if symptoms worsen or fail to improve.    Recommended Referrals: None        This document has been electronically signed by Kayla Frank LCSW  January 30, 2022 19:18 EST        Part of this note may be an electronic transcription/translation of spoken language to printed text using the Dragon Dictation System.

## 2022-01-25 ENCOUNTER — TELEMEDICINE (OUTPATIENT)
Dept: PSYCHIATRY | Facility: CLINIC | Age: 27
End: 2022-01-25

## 2022-01-25 DIAGNOSIS — F41.9 ANXIETY DISORDER, UNSPECIFIED TYPE: Chronic | ICD-10-CM

## 2022-01-25 DIAGNOSIS — G47.9 SLEEPING DIFFICULTIES: ICD-10-CM

## 2022-01-25 DIAGNOSIS — F41.0 PANIC DISORDER (EPISODIC PAROXYSMAL ANXIETY): ICD-10-CM

## 2022-01-25 DIAGNOSIS — R45.4 IRRITABILITY: ICD-10-CM

## 2022-01-25 DIAGNOSIS — F39 UNSPECIFIED MOOD (AFFECTIVE) DISORDER: Primary | Chronic | ICD-10-CM

## 2022-01-25 DIAGNOSIS — R45.86 MOOD SWINGS: ICD-10-CM

## 2022-01-25 PROCEDURE — 99214 OFFICE O/P EST MOD 30 MIN: CPT | Performed by: NURSE PRACTITIONER

## 2022-01-25 PROCEDURE — 90833 PSYTX W PT W E/M 30 MIN: CPT | Performed by: NURSE PRACTITIONER

## 2022-01-25 RX ORDER — BUSPIRONE HYDROCHLORIDE 10 MG/1
10 TABLET ORAL 2 TIMES DAILY
Qty: 60 TABLET | Refills: 0 | Status: SHIPPED | OUTPATIENT
Start: 2022-01-25 | End: 2022-02-23 | Stop reason: SDUPTHER

## 2022-01-25 RX ORDER — LAMOTRIGINE 100 MG/1
100 TABLET ORAL 2 TIMES DAILY
Qty: 60 TABLET | Refills: 0 | Status: SHIPPED | OUTPATIENT
Start: 2022-01-25 | End: 2022-02-23 | Stop reason: SDUPTHER

## 2022-01-25 NOTE — PROGRESS NOTES
"This provider is located at the [facility] (through [facility]), [address] [facility] [address], [facility], [address] using a secure [employer] Video Visit through [employer]. Patient is being seen remotely via telehealth at their home address in [place], and stated they are in a secure environment for this session. The patient's condition being diagnosed/treated is appropriate for telemedicine. The provider identified herself as well as her credentials. The patient, and/or patients guardian, consent to be seen remotely, and when consent is given they understand that the consent allows for patient identifiable information to be sent to a third party as needed. They may refuse to be seen remotely at any time. The electronic data is encrypted and password protected, and the patient and/or guardian has been advised of the potential risks to privacy not withstanding such measures.    You have chosen to receive care through a telehealth visit.  Do you consent to use a video/audio connection for your medical care today? Yes     Subjective   Genesis Chapin is a 26 y.o. female who is here today for medication management follow up.    Chief Complaint: Depression, anxiety, panic attacks, sleeping difficulties    History of Present Illness:  The patient describes her mood as \"okay\" over the last few weeks.  The patient states that overall she has been doing okay over the past few weeks.  The patient states that she is still having a lot of issues with \"lashing out\" and getting very angry/irritable whenever she becomes overwhelmed.  The patient states that she is still frequently becoming overwhelmed very easily, resulting in behavioral outbursts.  The patient states that this is concerning for her because she doesn't want to do this in front of her children, but states that she feels unable to control it.  The patient states that she noticed that she can \"calm down\" easier and more quickly since " beginning the increased dose of Lamictal, but states that she hasn't noticed any improvement in preventing them from occurring.  The patient states that she is unable to take the Propanolol during the daytime whenever she feels herself becoming overwhelmed because it is too sedating.  The patient states that instead she will just smoke marijuana because it helps her to calm down but does not cause her to feel sedated.  The patient states that other than her irritability/anger she feels that she has been doing rather well.  The patient states that she is also working on these things in therapy and endorses that therapy has been going well.  The patient reports her appetite as good.  The patient reports her sleep as good.  The patient states that overall she has been sleeping better over the past few weeks.  The patient states that falling asleep is still difficult sometimes, but states that overall this has improved as well.  The patient states that her children being back in school after sarah break and recent snow days has been helping her sleep also.  The patient denies any nightmares or bad dreams.  The patient denies any new medical problems or changes in medications since last appointment with this facility.  The patient reports compliance with current medication regimen.  The patient denies any current side effects from her current medication regimen.  The patient denies any abnormal muscle movements or tics.  The patient rates her depression at a 4/10 on a 0-10 scale, with 10 being the worst.  The patient rates her anxiety at a 10/10 on a 0-10 scale, with 10 being the worst.  The patient rates hopelessness at a 0/10 on a 0-10 scale with 10 being the worst.  The patient denies suicidal ideations and homicidal ideations, and is convincing.  The patient denies any auditory hallucinations or visual hallucinations.  The patient does not endorse significant symptoms consistent with bipolar disorder or a  psychotic illness.          LMP:  4 weeks ago.  The patient denies any chance of pregnancy at this time.  The patient was educated that her prescribed medications can have potential risk to a developing fetus. The patient is advised to contact this APRN/this office if she becomes pregnant or plans to become pregnant.  Pt verbalizes understanding and acknowledged agreement with this plan in her own words.      Past Psychiatric Medications:  Lexapro 10 mg daily - effective for depression, but ineffective for anxiety   Klonopin 0.5 mg three times daily as needed for anxiety/panic  - effective but discontinued due to marijuana use  Hydroxyzine 25 mg three times daily as needed for anxiety/panic - ineffective      The following portions of the patient's history were reviewed and updated as appropriate: allergies, current medications, past family history, past medical history, past social history, past surgical history and problem list.    Review of Systems   Constitutional: Positive for fatigue. Negative for activity change, appetite change and unexpected weight change.   Psychiatric/Behavioral: Positive for agitation, behavioral problems, decreased concentration and dysphoric mood. Negative for confusion, hallucinations, self-injury, sleep disturbance and suicidal ideas. The patient is nervous/anxious. The patient is not hyperactive.        Objective   Physical Exam  Constitutional:       Appearance: Normal appearance.   Neurological:      Mental Status: She is alert.   Psychiatric:         Attention and Perception: Perception normal. She is inattentive.         Mood and Affect: Affect normal. Mood is anxious.         Speech: Speech normal.         Behavior: Behavior normal. Behavior is cooperative.         Thought Content: Thought content normal. Thought content does not include homicidal or suicidal ideation. Thought content does not include homicidal or suicidal plan.         Cognition and Memory: Cognition and  memory normal.         Judgment: Judgment is impulsive.       not currently breastfeeding.    The patient was seen remotely today via a MyChart Video Visit through [facility].  Unable to obtain vital signs due to nature of remote visit.  Height stated at 67 inches.  Weight stated at 250 pounds.     Allergies   Allergen Reactions   • Amoxicillin Nausea And Vomiting   • Penicillins Nausea And Vomiting       No results found for this or any previous visit (from the past 2016 hour(s)).    Current Medications:   Current Outpatient Medications   Medication Sig Dispense Refill   • busPIRone (BUSPAR) 10 MG tablet Take 1 tablet by mouth 2 (Two) Times a Day. 60 tablet 0   • lamoTRIgine (LaMICtal) 100 MG tablet Take 1 tablet by mouth 2 (Two) Times a Day. 60 tablet 0   • propranolol (INDERAL) 10 MG tablet Take 1 tablet by mouth 2 (Two) Times a Day As Needed (anxiety/panic). 60 tablet 0   • traMADol (ULTRAM) 50 MG tablet Take 1 tablet by mouth Every 6 (Six) Hours As Needed for Moderate Pain . 60 tablet 2   • venlafaxine XR (EFFEXOR-XR) 150 MG 24 hr capsule Take 1 capsule by mouth Daily. 30 capsule 0     No current facility-administered medications for this visit.       Mental Status Exam:   Hygiene:   good  Cooperation:  Cooperative  Eye Contact:  Good  Psychomotor Behavior:  Aggitated  Affect:  Full range  Hopelessness: Denies  Speech:  Normal  Thought Process:  Goal directed  Thought Content:  Normal  Suicidal:  None  Homicidal:  None  Hallucinations:  None  Delusion:  None  Memory:  Intact  Orientation:  Person, Place, Time and Situation  Reliability:  good  Insight:  Fair  Judgement:  Fair  Impulse Control:  Fair  Physical/Medical Issues:  Yes See medical history        Assessment/Plan   Diagnoses and all orders for this visit:    1. Unspecified mood (affective) disorder (HCC) (Primary)  -     lamoTRIgine (LaMICtal) 100 MG tablet; Take 1 tablet by mouth 2 (Two) Times a Day.  Dispense: 60 tablet; Refill: 0  -     busPIRone (BUSPAR)  10 MG tablet; Take 1 tablet by mouth 2 (Two) Times a Day.  Dispense: 60 tablet; Refill: 0    2. Anxiety disorder, unspecified type  -     lamoTRIgine (LaMICtal) 100 MG tablet; Take 1 tablet by mouth 2 (Two) Times a Day.  Dispense: 60 tablet; Refill: 0  -     busPIRone (BUSPAR) 10 MG tablet; Take 1 tablet by mouth 2 (Two) Times a Day.  Dispense: 60 tablet; Refill: 0    3. Panic disorder (episodic paroxysmal anxiety)  -     busPIRone (BUSPAR) 10 MG tablet; Take 1 tablet by mouth 2 (Two) Times a Day.  Dispense: 60 tablet; Refill: 0    4. Mood swings  -     lamoTRIgine (LaMICtal) 100 MG tablet; Take 1 tablet by mouth 2 (Two) Times a Day.  Dispense: 60 tablet; Refill: 0    5. Irritability  -     lamoTRIgine (LaMICtal) 100 MG tablet; Take 1 tablet by mouth 2 (Two) Times a Day.  Dispense: 60 tablet; Refill: 0    6. Sleeping difficulties        In/Start Time: 11:10 AM.  Out/Stop Time: 11:27 AM.  17 minutes of face to face direct patient care with patient was spent for supportive psychotherapy including promoting the therapeutic alliance, strengthening self awareness and insights, strengthening coping skills, discussing relaxation techniques, counseling the patient regarding diagnoses, utilizing cognitive behavioral therapy to assist the patient in recognizing more appropriate coping mechanisms which are proven effective in reducing the severity of frequency of symptoms and and in coordination of care.  This APRN assisted the patient in processing the patient's diagnoses including anxiety, depression, and irritability/mood swings, and also acknowledged and normalized the patient's thoughts, feelings, and concerns  The patient is also strongly urged to eat healthy well balanced foods in order to reduce further health risks, and exercise as tolerated and in guidance with the patient's PCP's recommendations. This APRN allowed the patient to freely discuss issues without interruption or judgment.  This APRN answered any  questions the patient had regarding medication and treatment plan.        Visit Diagnoses:    ICD-10-CM ICD-9-CM   1. Unspecified mood (affective) disorder ([facility])  F39 296.90   2. Anxiety disorder, unspecified type  F41.9 300.00   3. Panic disorder (episodic paroxysmal anxiety)  F41.0 300.01   4. Mood swings  R45.86 799.24   5. Irritability  R45.4 799.22   6. Sleeping difficulties  G47.9 780.50       GOALS:  Short Term Goals: Patient will be compliant with medication, and patient will have no significant medication related side effects.  Patient will be engaged in psychotherapy as indicated.  Patient will report subjective improvement of symptoms.  Long term goals: To stabilize mood and treat/improve subjective symptoms, the patient will stay out of the hospital, the patient will be at an optimal level of functioning, and the patient will take all medications as prescribed.  The patient verbalized understanding and agreement with goals that were mutually set.      SUICIDE RISK ASSESSMENT: Unalterable demographics and a history of mental health intervention indicate this patient is in a high risk category compared to the general population. At present, the patient denies active SI/HI, intentions, or plans at this time and agrees to seek immediate care should such thoughts develop. The patient verbalizes understanding of how to access emergency care if needed and agrees to do so. Consideration of suicide risk and protective factors such as history, current presentation, individual strengths and weaknesses, psychosocial and environmental stressors and variables, psychiatric illness and symptoms, medical conditions and pain, took place in this interview. Based on those considerations, the patient is determined: within individual baseline and presenting no imminent risk for suicide or homicide. Other recommendations: The patient does not meet the criteria for inpatient admission and is not a safety risk to self or others  at today's visit. Inpatient treatment offers no significant advantages over outpatient treatment for this patient at today's visit.      SAFETY PLAN:  Patient was given ample time for questions and fully participated in treatment planning.  Patient was encouraged to call the clinic with any questions or concerns.  Patient was informed of access to emergency care. If patient were to develop any significant symptomatology, suicidal ideation, homicidal ideation, any concerns, or feel unsafe at any time they are to call the clinic and if unable to get immediate assistance should immediately call 911 or go to the nearest emergency room.  The patient is advised to remove or secure (lock away) all lethal weapons (including guns) and sharps (including razors, scissors, knives, etc.).  All medications (including any prescribed and any over the counter medications) should be stored in a safe and secured location that is not obtainable by children/adolescents.  Patient was given an opportunity and encouraged to ask questions about their medication, illness, and treatment. Patient contracted verbally for the following: If you are experiencing an emotional crisis or have thoughts of harming yourself or others, please go to your nearest local emergency room or call 911. Will continue to re-assess medication response and side effects frequently to establish efficacy and ensure safety. Risks, any black box warnings, side effects, off label usage, and benefits of medication and treatment discussed with patient, along with potential adverse side effects of current and/or newly prescribed medication, alternative treatment options, and OTC medications.  Patient verbalized understanding of potential risks, any off label use of medication, any black box warnings, and any side effects in their own words. The patient verbalized understanding and agreed to comply with the safety plan discussed in their own words.  Patient given the number  to the office. Number also available to the 24- hour suicide hotline.      TREATMENT PLAN/GOALS: Continue medications and treatment plan as indicated. Treatment and medication options discussed during today's visit. Patient acknowledged and verbally consented to continue with current treatment plan and was educated on the importance of compliance with treatment and follow-up appointments.        -Increase Lamictal to 100 mg twice daily for mood stabilization  -Increase Buspar to 10 mg twice daily as adjunct for depression/anxiety   -Continue Effexor  mg daily for depression/mood and anxiety  -Continue propanolol 10 mg twice daily as needed for anxiety/panic  -Continue psychotherapy with Kayla Frank LCSW  -Rule out bipolar/mood disorder        MEDICATION ISSUES: Discussed medication options and treatment plan of prescribed medication, any off label use of medication, as well as the risks, benefits, any black box warnings including increased suicidality, and side effects including but not limited to potential falls, dizziness, possible impaired driving, GI side effects (change in appetite, abdominal discomfort, nausea, vomiting, diarrhea, and/or constipation), dry mouth, somnolence, sedation, insomnia, activation, agitation, irritation, tremors, abnormal muscle movements or disorders, headache, sweating, possible bruising or rare bleeding, electrolyte and/or fluid abnormalities, change in blood pressure/heart rate/and or heart rhythm, sexual dysfunction, and metabolic adversities among others. Patient and/or guardian agreeable to call the office with any worsening of symptoms or onset of side effects, or if any concerns or questions arise.  The contact information for the office is made available to the patient and/or guardian.  Patient and/or guardian agreeable to call 911 or go to the nearest ER should they begin having any SI/HI, or if any urgent concerns arise. No medication side effects or related  complaints today.    This APRN has discussed the benefits and risks of starting Lamictal (Lamotrigine).  The side effects of Lamictal can include a benign rash, blurred or double vision, dizziness, ataxia, sedation, headache, tremor, insomnia, poor coordination, fatigue,  nausea, vomiting, dyspepsia, rhinitis, infection, pharyngitis, asthenia, a rare but serious rash, rare multi-organ failure associated with Torres-Sander Syndrome, toxic epidermal necrolysis, drug hypersensitivity syndrome, rare blood dyscrasias, rare aseptic meningitis, rare sudden unexplained deaths in people with epilepsy, withdrawal seizures upon abrupt withdrawal, and rare activation of suicidal ideation and behavior (suicidality).  This APRN has discussed with the patient that a very slow dose titration when starting Lamictal may reduce the incidence of skin rash and other side effects.  The dosage should not be titrated upwards or increased faster than recommended due to the possibility of the discussed side effects and risk of development of a skin rash (which can become life threatening).    This APRN has also discussed with the patient that if the patient stops taking the Lamictal for 5 days or longer, it will be necessary to restart the drug with the initial dose titration, as rashes have been reported on reexposure.    This APRN has also discussed with the patient that the patient should avoid new medications, foods, or products during the first 3 months of Lamictal treatment in order to decrease the risk of unrelated rash.  The patient should also not start Lamictal within 2 weeks of a viral infection, a rash, or a vaccination.  Also, if the patient and Provider decide to stop the Lamictal, the patient will follow the directions of this APRN/this office as a guided taper over about two weeks is appropriate due to the risk of relapse in bipolar disorder, the risk of seizures in those with epilepsy, and discontinuation symptoms upon  rapid discontinuation of Lamictal.    The patient verbalizes understanding of benefits and risks as discussed, the patient feels the benefits outweigh the risks and is agreeable to start Lamictal via a slow titration schedule as discussed.  The patient is advised should any side effects or rash develops they are to stop the Lamictal immediately and contact this APRN/this office or go to the emergency department immediately.  The patient verbalizes understanding and agreement with treatment plan in their own words.                        [facility] No Show Policy:  We understand unexpected circumstances arise; however, anytime you miss your appointment we are unable to provide you appropriate care.  In addition, each appointment missed could have been used to provide care for others.  We ask that you call at least 24 hours in advance to cancel or reschedule an appointment.  We would like to take this opportunity to remind you of our policy stating patients who miss THREE or more appointments without cancelling or rescheduling 24 hours in advance of the appointment may be subject to cancellation of any further visits with our clinic and recommendation to seek in-person services/visits.    Please call [phone] to reschedule your appointment. If there are reasons that make it difficult for you to keep the appointments, please call and let us know how we can help.  Please understand that medication prescribing will not continue without seeing your provider.      [facility]'s No Show Policy reviewed with patient at today's visit. Patient verbalized understanding of this policy. Discussed with patient that in the event that there are three or more no show visits, it will be recommended that they pursue in-person services/visits as noncompliance with telehealth visits indicates that patient is not an appropriate candidate for telemedicine and would likely be more  appropriate for in-person services/visits. Patient verbalizes understanding and is agreeable to this.              MEDS ORDERED DURING VISIT:  New Medications Ordered This Visit   Medications   • lamoTRIgine (LaMICtal) 100 MG tablet     Sig: Take 1 tablet by mouth 2 (Two) Times a Day.     Dispense:  60 tablet     Refill:  0   • busPIRone (BUSPAR) 10 MG tablet     Sig: Take 1 tablet by mouth 2 (Two) Times a Day.     Dispense:  60 tablet     Refill:  0       Return in about 4 weeks (around 2/22/2022), or if symptoms worsen or fail to improve, for Recheck.        Patient will follow-up in 4 weeks, highly encouraged the patient if she had any questions or concerns to contact the behavioral health virtual clinic for sooner appointment patient verbalized understanding.      Functional Status: Moderate impairment     Prognosis: Guarded with Ongoing Treatment            This document has been electronically signed by LENA Barajas  January 25, 2022 11:28 EST       Some of the data in this electronic note has been brought forward from a previous encounter, any necessary changes have been made, it has been reviewed by this APRN, and it is accurate.      Part of this note may be an electronic transcription/translation of spoken language to printed text using the Dragon Dictation System.

## 2022-01-26 ENCOUNTER — TELEMEDICINE (OUTPATIENT)
Dept: PSYCHIATRY | Facility: CLINIC | Age: 27
End: 2022-01-26

## 2022-01-26 DIAGNOSIS — F43.10 POST TRAUMATIC STRESS DISORDER (PTSD): Primary | ICD-10-CM

## 2022-01-26 PROCEDURE — 90837 PSYTX W PT 60 MINUTES: CPT | Performed by: SOCIAL WORKER

## 2022-01-26 NOTE — PROGRESS NOTES
[facility]   Follow-up Progress Note     Date: January 31, 2022  Time In: 9:38AM  Time Out: 10:33 AM      PROGRESS NOTE  Data:  Genesis Chapin is a 26 y.o. female presenting to [facility] for follow-up with Kayla Frank LCSW. The patient is seen remotely using [facility] My Chart. Patient is being seen via telehealth and stated they are in a secure environment for this session. Patient is located at home.The patient's condition being diagnosed/treated is appropriate for telemedicine. The provider identified herself as well as her credentials. The patient and/or patients guardian consent to be seen remotely, and when consent is given they understand that the consent allows for patient identifiable information to be sent to a third party as needed. They may refuse to be seen remotely at any time. The electronic data is encrypted and password protected, and the patient has been advised of the potential risks to privacy not withstanding such measures.    Today Patient presented for follow-up appointment.  She checked in with how the homework was, she stated it was a lot because she relies to have any stuck point she has.  Patient completed PCL 5 with a score of 49.  Patient rewrote the second part of impact statement and reviewed this, patient and provider continued identifying stuck points throughout.  Patient and provider reviewed ABC sheets.  Patient continued identifying and challenging stuck points.  Patient and provider discussed a written account and assigned this as homework as patient stated she would like to do this.    Clinical Maneuvering/Intervention: Cognitive processing therapy      Assisted Patient in processing above session content; acknowledged and normalized patient’s thoughts, feelings, and concerns.  Rationalized patient thought process regarding stuck points.  Discussed triggers associated with patient's PTSD.  Also  discussed coping skills for patient to implement such as cognitive reprocessing.    Allowed Patient to freely discuss issues  without interruption or judgement with unconditional positive regard, active listening skills, and empathy. Therapist provided a safe, confidential environment to facilitate the development of a positive therapeutic relationship and encouraged open, honest communication. Assisted Patient in identifying risk factors which would indicate the need for higher level of care including thoughts to harm self or others and/or self-harming behavior and encouraged Patient to contact this office, call 911, or present to the nearest emergency room should any of these events occur. Discussed crisis intervention services and means to access. Patient adamantly and convincingly denies current suicidal or homicidal ideation or perceptual disturbance. Assisted Patient in processing session content; acknowledged and normalized Patient’s thoughts, feelings, and concerns by utilizing a person-centered approach in efforts to build appropriate rapport and a positive therapeutic relationship with open and honest communication. Therapist utilized dialectical behavior techniques to teach and model emotional regulation and relaxation methods. Therapist assisted Patient with identifying and implementing healthier coping strategies.     Assessment   Patient appears to be maintaining relative stability as compared to baseline.  Patient continues to struggle with PTSD.   As a result, they can be reasonably expected to continue to benefit from treatment and would likely be at increased risk for decompensation otherwise.    Mental Status Exam:   Hygiene:   good  Cooperation:  Cooperative  Eye Contact:  Good  Psychomotor Behavior:  Appropriate  Affect:  Appropriate  Mood: normal  Speech:  Normal  Thought Process:  Goal directed  Thought Content:  Normal  Suicidal:  None  Homicidal:  None  Hallucinations:  None  Delusion:   None  Memory:  Intact  Orientation:  Person, Place, Time and Situation  Reliability:  good  Insight:  Good  Judgement:  Good  Impulse Control:  Good  Physical/Medical Issues:  No      PHQ-Score Total:  PHQ-9 Total Score:      JUANCARLOS-7:       Patient's Support Network Includes:      Functional Status: Moderate impairment     Progress toward goal: Not at goal    Prognosis: Good with Ongoing Treatment            Impression/Formulation:    VISIT DIAGNOSIS:     ICD-10-CM ICD-9-CM   1. Post traumatic stress disorder (PTSD)  F43.10 309.81        Patient appeared alert and oriented.  Patient is voluntarily requesting to continue outpatient therapy at [facility].  Patient is receptive to assistance with maintaining a stable lifestyle.  Patient presents with history of PTSD.  Patient is agreeable to attend routine therapy sessions.  Patient expressed desire to maintain stability and participate in the therapeutic process.        Crisis Plan:  If symptoms/behaviors persist, Patient will present to the nearest hospital for an assessment. Advised patient of [facility] ER and assessment services.     Plan:   Patient will continue in individual outpatient therapy with focus on improved functioning and coping skills, maintaining stability, and avoiding decompensation and the need for higher level of care.    Patient will contact this office, call 911 or present to the nearest emergency room should suicidal or homicidal ideations occur. Provide Cognitive Behavioral Therapy and Solution Focused Therapy to improve functioning, maintain stability, and avoid decompensation and the need for higher level of care.     Return in about 1 weeks, or earlier if symptoms worsen or fail to improve.    Recommended Referrals: None        This document has been electronically signed by Kayla Frank LCSW  January 31, 2022 08:00 EST        Part of this note may be an electronic transcription/translation  of spoken language to printed text using the Dragon Dictation System.

## 2022-02-02 ENCOUNTER — TELEMEDICINE (OUTPATIENT)
Dept: PSYCHIATRY | Facility: CLINIC | Age: 27
End: 2022-02-02

## 2022-02-02 DIAGNOSIS — F43.10 POST TRAUMATIC STRESS DISORDER (PTSD): Primary | ICD-10-CM

## 2022-02-02 PROCEDURE — 90832 PSYTX W PT 30 MINUTES: CPT | Performed by: SOCIAL WORKER

## 2022-02-02 NOTE — PROGRESS NOTES
[facility]   Follow-up Progress Note     Date: February 4, 2022  Time In: 11:34AM  Time Out: 12:03 PM      PROGRESS NOTE  Data:  Genesis Chapin is a 26 y.o. female presenting to [facility] for follow-up with Kayla Frank LCSW. The patient is seen remotely using [facility] My Chart. Patient is being seen via telehealth in her home and stated they are in a secure environment for this session. The patient's condition being diagnosed/treated is appropriate for telemedicine. The provider identified herself as well as her credentials. The patient and/or patients guardian consent to be seen remotely, and when consent is given they understand that the consent allows for patient identifiable information to be sent to a third party as needed. They may refuse to be seen remotely at any time. The electronic data is encrypted and password protected, and the patient has been advised of the potential risks to privacy not withstanding such measures.    Today Patient presented for follow up appt. patient completed PCL 5.  Patient and provider reviewed trauma account patient identified emotions experienced.  Patient reviewed some of the ABC sheets and identified stuck points.  Patient was reassigned trauma account homework.       Clinical Maneuvering/Intervention: CPT    Assisted Patient in processing above session content; acknowledged and normalized patient’s thoughts, feelings, and concerns.  Rationalized patient thought process regarding trauma.  Discussed triggers associated with patient's PTSD.  Also discussed coping skills for patient to implement such as cognitive reprocessing.    Allowed Patient to freely discuss issues  without interruption or judgement with unconditional positive regard, active listening skills, and empathy. Therapist provided a safe, confidential environment to facilitate the development of a positive therapeutic  relationship and encouraged open, honest communication. Assisted Patient in identifying risk factors which would indicate the need for higher level of care including thoughts to harm self or others and/or self-harming behavior and encouraged Patient to contact this office, call 911, or present to the nearest emergency room should any of these events occur. Discussed crisis intervention services and means to access. Patient adamantly and convincingly denies current suicidal or homicidal ideation or perceptual disturbance. Assisted Patient in processing session content; acknowledged and normalized Patient’s thoughts, feelings, and concerns by utilizing a person-centered approach in efforts to build appropriate rapport and a positive therapeutic relationship with open and honest communication. Therapist utilized dialectical behavior techniques to teach and model emotional regulation and relaxation methods. Therapist assisted Patient with identifying and implementing healthier coping strategies.     Assessment   Patient appears to be maintaining relative stability as compared to baseline.  Patient continues to struggle with PTSD.   As a result, they can be reasonably expected to continue to benefit from treatment and would likely be at increased risk for decompensation otherwise.    Mental Status Exam:   Hygiene:   good  Cooperation:  Cooperative  Eye Contact:  Good  Psychomotor Behavior:  Appropriate  Affect:  Appropriate  Mood: normal  Speech:  Normal  Thought Process:  Goal directed  Thought Content:  Normal  Suicidal:  None  Homicidal:  None  Hallucinations:  None  Delusion:  None  Memory:  Intact  Orientation:  Person, Place, Time and Situation  Reliability:  good  Insight:  Good  Judgement:  Good  Impulse Control:  Good  Physical/Medical Issues:  No      PHQ-Score Total:  PHQ-9 Total Score:      JUANCARLOS-7:       Patient's Support Network Includes:      Functional Status: Moderate impairment     Progress toward  goal: Not at goal    Prognosis: Good with Ongoing Treatment            Impression/Formulation:    VISIT DIAGNOSIS:     ICD-10-CM ICD-9-CM   1. Post traumatic stress disorder (PTSD)  F43.10 309.81        Patient appeared alert and oriented.  Patient is voluntarily requesting to continue outpatient therapy at [facility].  Patient is receptive to assistance with maintaining a stable lifestyle.  Patient presents with history of PTSD.  Patient is agreeable to attend routine therapy sessions.  Patient expressed desire to maintain stability and participate in the therapeutic process.        Crisis Plan:  If symptoms/behaviors persist, Patient will present to the nearest hospital for an assessment. Advised patient of [facility] ER and assessment services.     Plan:   Patient will continue in individual outpatient therapy with focus on improved functioning and coping skills, maintaining stability, and avoiding decompensation and the need for higher level of care.    Patient will contact this office, call 911 or present to the nearest emergency room should suicidal or homicidal ideations occur. Provide Cognitive Behavioral Therapy and Solution Focused Therapy to improve functioning, maintain stability, and avoid decompensation and the need for higher level of care.     Return in about 1 weeks, or earlier if symptoms worsen or fail to improve.    Recommended Referrals: None        This document has been electronically signed by Kayla Frank LCSW  February 4, 2022 09:23 EST        Part of this note may be an electronic transcription/translation of spoken language to printed text using the Dragon Dictation System.

## 2022-02-07 DIAGNOSIS — G89.29 CHRONIC BILATERAL LOW BACK PAIN WITHOUT SCIATICA: ICD-10-CM

## 2022-02-07 DIAGNOSIS — M54.50 CHRONIC BILATERAL LOW BACK PAIN WITHOUT SCIATICA: ICD-10-CM

## 2022-02-08 RX ORDER — TRAMADOL HYDROCHLORIDE 50 MG/1
50 TABLET ORAL EVERY 6 HOURS PRN
Qty: 60 TABLET | Refills: 2 | Status: CANCELLED | OUTPATIENT
Start: 2022-02-08

## 2022-02-08 NOTE — TELEPHONE ENCOUNTER
LOV 01/07/2021      Spoke to patient she stated that she is looking for a new provider and does not want to schedule an appointment for her refills I advised patient that if she wanted anymore refills she would need to schedule an appointment. Patient verb good understanding.

## 2022-02-21 ENCOUNTER — TELEMEDICINE (OUTPATIENT)
Dept: PSYCHIATRY | Facility: CLINIC | Age: 27
End: 2022-02-21

## 2022-02-21 DIAGNOSIS — F41.0 PANIC DISORDER (EPISODIC PAROXYSMAL ANXIETY): ICD-10-CM

## 2022-02-21 DIAGNOSIS — F41.9 ANXIETY DISORDER, UNSPECIFIED TYPE: Chronic | ICD-10-CM

## 2022-02-21 DIAGNOSIS — F43.10 POST TRAUMATIC STRESS DISORDER (PTSD): Primary | ICD-10-CM

## 2022-02-21 DIAGNOSIS — F39 UNSPECIFIED MOOD (AFFECTIVE) DISORDER: Chronic | ICD-10-CM

## 2022-02-21 PROCEDURE — 90837 PSYTX W PT 60 MINUTES: CPT | Performed by: SOCIAL WORKER

## 2022-02-21 RX ORDER — VENLAFAXINE HYDROCHLORIDE 150 MG/1
150 CAPSULE, EXTENDED RELEASE ORAL DAILY
Qty: 30 CAPSULE | Refills: 0 | Status: SHIPPED | OUTPATIENT
Start: 2022-02-21 | End: 2022-03-10 | Stop reason: SDUPTHER

## 2022-02-23 ENCOUNTER — TELEMEDICINE (OUTPATIENT)
Dept: PSYCHIATRY | Facility: CLINIC | Age: 27
End: 2022-02-23

## 2022-02-23 DIAGNOSIS — F41.9 ANXIETY DISORDER, UNSPECIFIED TYPE: Chronic | ICD-10-CM

## 2022-02-23 DIAGNOSIS — G47.9 SLEEPING DIFFICULTIES: ICD-10-CM

## 2022-02-23 DIAGNOSIS — R45.4 IRRITABILITY: ICD-10-CM

## 2022-02-23 DIAGNOSIS — R45.86 MOOD SWINGS: ICD-10-CM

## 2022-02-23 DIAGNOSIS — F39 UNSPECIFIED MOOD (AFFECTIVE) DISORDER: Primary | Chronic | ICD-10-CM

## 2022-02-23 DIAGNOSIS — F41.0 PANIC DISORDER (EPISODIC PAROXYSMAL ANXIETY): ICD-10-CM

## 2022-02-23 PROCEDURE — 99214 OFFICE O/P EST MOD 30 MIN: CPT | Performed by: NURSE PRACTITIONER

## 2022-02-23 RX ORDER — LAMOTRIGINE 100 MG/1
100 TABLET ORAL 2 TIMES DAILY
Qty: 60 TABLET | Refills: 0 | Status: SHIPPED | OUTPATIENT
Start: 2022-02-23 | End: 2022-03-10 | Stop reason: SDUPTHER

## 2022-02-23 RX ORDER — QUETIAPINE FUMARATE 25 MG/1
12.5 TABLET, FILM COATED ORAL 2 TIMES DAILY
Qty: 30 TABLET | Refills: 0 | Status: SHIPPED | OUTPATIENT
Start: 2022-02-23 | End: 2022-03-10 | Stop reason: SDUPTHER

## 2022-02-23 RX ORDER — BUSPIRONE HYDROCHLORIDE 10 MG/1
10 TABLET ORAL 2 TIMES DAILY
Qty: 60 TABLET | Refills: 0 | Status: SHIPPED | OUTPATIENT
Start: 2022-02-23 | End: 2022-03-10 | Stop reason: SDUPTHER

## 2022-02-23 NOTE — PROGRESS NOTES
"This provider is located at the [facility] (through [facility]), [address], [facility], [address] using a secure [employer] Video Visit through [employer]. Patient is being seen remotely via telehealth at their home address in [place], and stated they are in a secure environment for this session. The patient's condition being diagnosed/treated is appropriate for telemedicine. The provider identified herself as well as her credentials. The patient, and/or patients guardian, consent to be seen remotely, and when consent is given they understand that the consent allows for patient identifiable information to be sent to a third party as needed. They may refuse to be seen remotely at any time. The electronic data is encrypted and password protected, and the patient and/or guardian has been advised of the potential risks to privacy not withstanding such measures.    You have chosen to receive care through a telehealth visit.  Do you consent to use a video/audio connection for your medical care today? Yes     Subjective   Genesis Chapin is a 26 y.o. female who is here today for medication management follow up.    Chief Complaint: Depression, anxiety, panic attacks, sleeping difficulties    History of Present Illness:  The patient describes her mood as \"more anxious\" over the last few weeks.  The patient states that her family came to visit a few weeks ago and endorses that her anxiety became significantly exacerbated then.  The patient states that her anxiety has progressively increased since that time.  The patient states that her panic attacks have significantly increased over the past few weeks as well as her anxiety is increased.  The patient states that she is also noticed that depression is increased as her anxiety has increased.  Patient states that she still having a lot of mood swings and irritability as well.  The patient reports her appetite as good.  The patient " reports her sleep as fair.  The patient states that she has been sleeping well other than dealing with her daughter at night who hasn't been sleeping well.  The patient denies any nightmares or bad dreams.  The patient denies any new medical problems or changes in medications since last appointment with this facility.  The patient reports compliance with current medication regimen.  The patient denies any current side effects from her current medication regimen.  The patient denies any abnormal muscle movements or tics.  The patient rates her depression at a 5-6/10 on a 0-10 scale, with 10 being the worst.  The patient rates her anxiety at a 10/10 on a 0-10 scale, with 10 being the worst.  The patient rates hopelessness at a 0/10 on a 0-10 scale with 10 being the worst.  The patient denies suicidal ideations and homicidal ideations, and is convincing.  The patient denies any auditory hallucinations or visual hallucinations.  The patient does not endorse significant symptoms consistent with a psychotic illness.              LMP:  3-4 weeks ago.  The patient denies any chance of pregnancy at this time.  The patient was educated that her prescribed medications can have potential risk to a developing fetus. The patient is advised to contact this APRN/this office if she becomes pregnant or plans to become pregnant.  Pt verbalizes understanding and acknowledged agreement with this plan in her own words.      Past Psychiatric Medications:  Lexapro 10 mg daily - effective for depression, but ineffective for anxiety   Klonopin 0.5 mg three times daily as needed for anxiety/panic  - effective but discontinued due to marijuana use  Hydroxyzine 25 mg three times daily as needed for anxiety/panic - ineffective      The following portions of the patient's history were reviewed and updated as appropriate: allergies, current medications, past family history, past medical history, past social history, past surgical history and  problem list.    Review of Systems   Constitutional: Positive for activity change and fatigue. Negative for appetite change and unexpected weight change.   Psychiatric/Behavioral: Positive for agitation, behavioral problems, decreased concentration and dysphoric mood. Negative for confusion, hallucinations, self-injury, sleep disturbance and suicidal ideas. The patient is nervous/anxious. The patient is not hyperactive.        Objective   Physical Exam  Constitutional:       Appearance: Normal appearance.   Neurological:      Mental Status: She is alert.   Psychiatric:         Attention and Perception: Perception normal. She is inattentive.         Mood and Affect: Affect normal. Mood is anxious and depressed.         Speech: Speech normal.         Behavior: Behavior normal. Behavior is cooperative.         Thought Content: Thought content normal. Thought content does not include homicidal or suicidal ideation. Thought content does not include homicidal or suicidal plan.         Cognition and Memory: Cognition and memory normal.         Judgment: Judgment is impulsive.       not currently breastfeeding.    The patient was seen remotely today via a MyChart Video Visit through [facility].  Unable to obtain vital signs due to nature of remote visit.  Height stated at 67 inches.  Weight stated at 250 pounds.     Allergies   Allergen Reactions   • Amoxicillin Nausea And Vomiting   • Penicillins Nausea And Vomiting       No results found for this or any previous visit (from the past 2016 hour(s)).    Current Medications:   Current Outpatient Medications   Medication Sig Dispense Refill   • busPIRone (BUSPAR) 10 MG tablet Take 1 tablet by mouth 2 (Two) Times a Day. 60 tablet 0   • lamoTRIgine (LaMICtal) 100 MG tablet Take 1 tablet by mouth 2 (Two) Times a Day. 60 tablet 0   • propranolol (INDERAL) 10 MG tablet Take 1 tablet by mouth 2 (Two) Times a Day As Needed (anxiety/panic). 60 tablet 0   • venlafaxine XR (EFFEXOR-XR) 150 MG  24 hr capsule Take 1 capsule by mouth Daily. 30 capsule 0   • QUEtiapine (SEROquel) 25 MG tablet Take 0.5 tablets by mouth 2 (Two) Times a Day. 30 tablet 0     No current facility-administered medications for this visit.       Mental Status Exam:   Hygiene:   good  Cooperation:  Cooperative  Eye Contact:  Good  Psychomotor Behavior:  Appropriate  Affect:  Full range  Hopelessness: Denies  Speech:  Normal  Thought Process:  Goal directed  Thought Content:  Normal  Suicidal:  None  Homicidal:  None  Hallucinations:  None  Delusion:  None  Memory:  Intact  Orientation:  Person, Place, Time and Situation  Reliability:  good  Insight:  Fair  Judgement:  Fair  Impulse Control:  Fair  Physical/Medical Issues:  Yes See medical history        Assessment/Plan   Diagnoses and all orders for this visit:    1. Unspecified mood (affective) disorder (HCC) (Primary)  -     busPIRone (BUSPAR) 10 MG tablet; Take 1 tablet by mouth 2 (Two) Times a Day.  Dispense: 60 tablet; Refill: 0  -     lamoTRIgine (LaMICtal) 100 MG tablet; Take 1 tablet by mouth 2 (Two) Times a Day.  Dispense: 60 tablet; Refill: 0  -     QUEtiapine (SEROquel) 25 MG tablet; Take 0.5 tablets by mouth 2 (Two) Times a Day.  Dispense: 30 tablet; Refill: 0    2. Anxiety disorder, unspecified type  -     busPIRone (BUSPAR) 10 MG tablet; Take 1 tablet by mouth 2 (Two) Times a Day.  Dispense: 60 tablet; Refill: 0  -     lamoTRIgine (LaMICtal) 100 MG tablet; Take 1 tablet by mouth 2 (Two) Times a Day.  Dispense: 60 tablet; Refill: 0  -     QUEtiapine (SEROquel) 25 MG tablet; Take 0.5 tablets by mouth 2 (Two) Times a Day.  Dispense: 30 tablet; Refill: 0    3. Panic disorder (episodic paroxysmal anxiety)  -     busPIRone (BUSPAR) 10 MG tablet; Take 1 tablet by mouth 2 (Two) Times a Day.  Dispense: 60 tablet; Refill: 0  -     QUEtiapine (SEROquel) 25 MG tablet; Take 0.5 tablets by mouth 2 (Two) Times a Day.  Dispense: 30 tablet; Refill: 0    4. Sleeping difficulties    5. Mood  swings  -     lamoTRIgine (LaMICtal) 100 MG tablet; Take 1 tablet by mouth 2 (Two) Times a Day.  Dispense: 60 tablet; Refill: 0  -     QUEtiapine (SEROquel) 25 MG tablet; Take 0.5 tablets by mouth 2 (Two) Times a Day.  Dispense: 30 tablet; Refill: 0    6. Irritability  -     lamoTRIgine (LaMICtal) 100 MG tablet; Take 1 tablet by mouth 2 (Two) Times a Day.  Dispense: 60 tablet; Refill: 0  -     QUEtiapine (SEROquel) 25 MG tablet; Take 0.5 tablets by mouth 2 (Two) Times a Day.  Dispense: 30 tablet; Refill: 0              Visit Diagnoses:    ICD-10-CM ICD-9-CM   1. Unspecified mood (affective) disorder ([facility])  F39 296.90   2. Anxiety disorder, unspecified type  F41.9 300.00   3. Panic disorder (episodic paroxysmal anxiety)  F41.0 300.01   4. Sleeping difficulties  G47.9 780.50   5. Mood swings  R45.86 799.24   6. Irritability  R45.4 799.22       GOALS:  Short Term Goals: Patient will be compliant with medication, and patient will have no significant medication related side effects.  Patient will be engaged in psychotherapy as indicated.  Patient will report subjective improvement of symptoms.  Long term goals: To stabilize mood and treat/improve subjective symptoms, the patient will stay out of the hospital, the patient will be at an optimal level of functioning, and the patient will take all medications as prescribed.  The patient verbalized understanding and agreement with goals that were mutually set.      SUICIDE RISK ASSESSMENT: Unalterable demographics and a history of mental health intervention indicate this patient is in a high risk category compared to the general population. At present, the patient denies active SI/HI, intentions, or plans at this time and agrees to seek immediate care should such thoughts develop. The patient verbalizes understanding of how to access emergency care if needed and agrees to do so. Consideration of suicide risk and protective factors such as history, current presentation,  individual strengths and weaknesses, psychosocial and environmental stressors and variables, psychiatric illness and symptoms, medical conditions and pain, took place in this interview. Based on those considerations, the patient is determined: within individual baseline and presenting no imminent risk for suicide or homicide. Other recommendations: The patient does not meet the criteria for inpatient admission and is not a safety risk to self or others at today's visit. Inpatient treatment offers no significant advantages over outpatient treatment for this patient at today's visit.      SAFETY PLAN:  Patient was given ample time for questions and fully participated in treatment planning.  Patient was encouraged to call the clinic with any questions or concerns.  Patient was informed of access to emergency care. If patient were to develop any significant symptomatology, suicidal ideation, homicidal ideation, any concerns, or feel unsafe at any time they are to call the clinic and if unable to get immediate assistance should immediately call 911 or go to the nearest emergency room.  The patient is advised to remove or secure (lock away) all lethal weapons (including guns) and sharps (including razors, scissors, knives, etc.).  All medications (including any prescribed and any over the counter medications) should be stored in a safe and secured location that is not obtainable by children/adolescents.  Patient was given an opportunity and encouraged to ask questions about their medication, illness, and treatment. Patient contracted verbally for the following: If you are experiencing an emotional crisis or have thoughts of harming yourself or others, please go to your nearest local emergency room or call 911. Will continue to re-assess medication response and side effects frequently to establish efficacy and ensure safety. Risks, any black box warnings, side effects, off label usage, and benefits of medication and  treatment discussed with patient, along with potential adverse side effects of current and/or newly prescribed medication, alternative treatment options, and OTC medications.  Patient verbalized understanding of potential risks, any off label use of medication, any black box warnings, and any side effects in their own words. The patient verbalized understanding and agreed to comply with the safety plan discussed in their own words.  Patient given the number to the office. Number also available to the 24- hour suicide hotline.      TREATMENT PLAN/GOALS: Continue medications and treatment plan as indicated. Treatment and medication options discussed during today's visit. Patient acknowledged and verbally consented to continue with current treatment plan and was educated on the importance of compliance with treatment and follow-up appointments.        -Begin Seroquel 12.5 mg twice daily for anxiety/mood  -Continue Lamictal 100 mg twice daily for mood stabilization  -Continue Buspar 10 mg twice daily as adjunct for mood/anxiety   -Continue Effexor  mg daily for depression/mood and anxiety  -Continue propanolol 10 mg twice daily as needed for anxiety/panic  -Continue psychotherapy with Kayla Frank LCSW  -Rule out bipolar/mood disorder        MEDICATION ISSUES: Discussed medication options and treatment plan of prescribed medication, any off label use of medication, as well as the risks, benefits, any black box warnings including increased suicidality, and side effects including but not limited to potential falls, dizziness, possible impaired driving, GI side effects (change in appetite, abdominal discomfort, nausea, vomiting, diarrhea, and/or constipation), dry mouth, somnolence, sedation, insomnia, activation, agitation, irritation, tremors, abnormal muscle movements or disorders, headache, sweating, possible bruising or rare bleeding, electrolyte and/or fluid abnormalities, change in blood pressure/heart  rate/and or heart rhythm, sexual dysfunction, and metabolic adversities among others. Patient and/or guardian agreeable to call the office with any worsening of symptoms or onset of side effects, or if any concerns or questions arise.  The contact information for the office is made available to the patient and/or guardian.  Patient and/or guardian agreeable to call 911 or go to the nearest ER should they begin having any SI/HI, or if any urgent concerns arise. No medication side effects or related complaints today.    This APRN has discussed the benefits and risks of starting Lamictal (Lamotrigine).  The side effects of Lamictal can include a benign rash, blurred or double vision, dizziness, ataxia, sedation, headache, tremor, insomnia, poor coordination, fatigue,  nausea, vomiting, dyspepsia, rhinitis, infection, pharyngitis, asthenia, a rare but serious rash, rare multi-organ failure associated with Torres-Sander Syndrome, toxic epidermal necrolysis, drug hypersensitivity syndrome, rare blood dyscrasias, rare aseptic meningitis, rare sudden unexplained deaths in people with epilepsy, withdrawal seizures upon abrupt withdrawal, and rare activation of suicidal ideation and behavior (suicidality).  This APRN has discussed with the patient that a very slow dose titration when starting Lamictal may reduce the incidence of skin rash and other side effects.  The dosage should not be titrated upwards or increased faster than recommended due to the possibility of the discussed side effects and risk of development of a skin rash (which can become life threatening).    This APRN has also discussed with the patient that if the patient stops taking the Lamictal for 5 days or longer, it will be necessary to restart the drug with the initial dose titration, as rashes have been reported on reexposure.    This APRN has also discussed with the patient that the patient should avoid new medications, foods, or products during the  first 3 months of Lamictal treatment in order to decrease the risk of unrelated rash.  The patient should also not start Lamictal within 2 weeks of a viral infection, a rash, or a vaccination.  Also, if the patient and Provider decide to stop the Lamictal, the patient will follow the directions of this APRN/this office as a guided taper over about two weeks is appropriate due to the risk of relapse in bipolar disorder, the risk of seizures in those with epilepsy, and discontinuation symptoms upon rapid discontinuation of Lamictal.    The patient verbalizes understanding of benefits and risks as discussed, the patient feels the benefits outweigh the risks and is agreeable to start Lamictal via a slow titration schedule as discussed.  The patient is advised should any side effects or rash develops they are to stop the Lamictal immediately and contact this APRN/this office or go to the emergency department immediately.  The patient verbalizes understanding and agreement with treatment plan in their own words.                        [facility] No Show Policy:  We understand unexpected circumstances arise; however, anytime you miss your appointment we are unable to provide you appropriate care.  In addition, each appointment missed could have been used to provide care for others.  We ask that you call at least 24 hours in advance to cancel or reschedule an appointment.  We would like to take this opportunity to remind you of our policy stating patients who miss THREE or more appointments without cancelling or rescheduling 24 hours in advance of the appointment may be subject to cancellation of any further visits with our clinic and recommendation to seek in-person services/visits.    Please call [phone] to reschedule your appointment. If there are reasons that make it difficult for you to keep the appointments, please call and let us know how we can help.  Please understand that medication  prescribing will not continue without seeing your provider.      [facility]'s No Show Policy reviewed with patient at today's visit. Patient verbalized understanding of this policy. Discussed with patient that in the event that there are three or more no show visits, it will be recommended that they pursue in-person services/visits as noncompliance with telehealth visits indicates that patient is not an appropriate candidate for telemedicine and would likely be more appropriate for in-person services/visits. Patient verbalizes understanding and is agreeable to this.              MEDS ORDERED DURING VISIT:  New Medications Ordered This Visit   Medications   • busPIRone (BUSPAR) 10 MG tablet     Sig: Take 1 tablet by mouth 2 (Two) Times a Day.     Dispense:  60 tablet     Refill:  0   • lamoTRIgine (LaMICtal) 100 MG tablet     Sig: Take 1 tablet by mouth 2 (Two) Times a Day.     Dispense:  60 tablet     Refill:  0   • QUEtiapine (SEROquel) 25 MG tablet     Sig: Take 0.5 tablets by mouth 2 (Two) Times a Day.     Dispense:  30 tablet     Refill:  0       Return in about 2 weeks (around 3/9/2022), or if symptoms worsen or fail to improve, for Recheck.        Patient will follow-up in 2 weeks, highly encouraged the patient if she had any questions or concerns to contact the behavioral health virtual clinic for sooner appointment patient verbalized understanding.      Functional Status: Moderate impairment     Prognosis: Guarded with Ongoing Treatment            This document has been electronically signed by LENA Barajas  February 23, 2022 08:43 EST       Some of the data in this electronic note has been brought forward from a previous encounter, any necessary changes have been made, it has been reviewed by this APRN, and it is accurate.      Part of this note may be an electronic transcription/translation of spoken language to printed text using the Dragon [employer] System.

## 2022-02-24 ENCOUNTER — TELEPHONE (OUTPATIENT)
Dept: PSYCHIATRY | Facility: CLINIC | Age: 27
End: 2022-02-24

## 2022-02-24 NOTE — TELEPHONE ENCOUNTER
Patient is on seroquel x2 daily.  She is finding she gets sleepy with this and wants to know if she can take the entire 25mg at night.  Please advise.

## 2022-03-02 ENCOUNTER — TELEMEDICINE (OUTPATIENT)
Dept: PSYCHIATRY | Facility: CLINIC | Age: 27
End: 2022-03-02

## 2022-03-02 DIAGNOSIS — F43.10 POST TRAUMATIC STRESS DISORDER (PTSD): Primary | ICD-10-CM

## 2022-03-02 PROCEDURE — 90837 PSYTX W PT 60 MINUTES: CPT | Performed by: SOCIAL WORKER

## 2022-03-02 NOTE — PROGRESS NOTES
[facility]   Follow-up Progress Note     Date: March 7, 2022  Time In: 9:40 AM  Time Out: 10:40 AM      PROGRESS NOTE  Data:  Genesis Chapin is a 26 y.o. female presenting to [facility] for follow-up with Kayla Frank LCSW. The patient is seen remotely using [facility] My Chart. Patient is being seen via telehealth and stated they are in a secure environment for this session.  Patient is located in her home.  The patient's condition being diagnosed/treated is appropriate for telemedicine. The provider identified herself as well as her credentials. The patient and/or patients guardian consent to be seen remotely, and when consent is given they understand that the consent allows for patient identifiable information to be sent to a third party as needed. They may refuse to be seen remotely at any time. The electronic data is encrypted and password protected, and the patient has been advised of the potential risks to privacy not withstanding such measures.    Today Patient presented for follow-up appointment.  She checked in with how she is doing.  Patient and provider explored avoidance behaviors and reviewed progress of scores on PCL 5.  Patient continues to struggle some with avoidance.  Patient did well to review practice assignment of challenging questions worksheets.  Patient identified avoidance and tendency to focus on others needs.  Patient does well to identify stuck points and continue to challenge.  Patient and provider reviewed patterns of problematic thinking and assigned next week's assignment.    Clinical Maneuvering/Intervention: Cognitive processing therapy    Assisted Patient in processing above session content; acknowledged and normalized patient’s thoughts, feelings, and concerns.  Rationalized patient thought process regarding avoidance and stuck points.  Discussed triggers associated with patient's PTSD.  Also  discussed coping skills for patient to implement such as cognitive reprocessing.    Allowed Patient to freely discuss issues  without interruption or judgement with unconditional positive regard, active listening skills, and empathy. Therapist provided a safe, confidential environment to facilitate the development of a positive therapeutic relationship and encouraged open, honest communication. Assisted Patient in identifying risk factors which would indicate the need for higher level of care including thoughts to harm self or others and/or self-harming behavior and encouraged Patient to contact this office, call 911, or present to the nearest emergency room should any of these events occur. Discussed crisis intervention services and means to access. Patient adamantly and convincingly denies current suicidal or homicidal ideation or perceptual disturbance. Assisted Patient in processing session content; acknowledged and normalized Patient’s thoughts, feelings, and concerns by utilizing a person-centered approach in efforts to build appropriate rapport and a positive therapeutic relationship with open and honest communication. Therapist utilized dialectical behavior techniques to teach and model emotional regulation and relaxation methods. Therapist assisted Patient with identifying and implementing healthier coping strategies.     Assessment   Patient appears to be maintaining relative stability as compared to baseline.  Patient continues to struggle with PTSD.   As a result, they can be reasonably expected to continue to benefit from treatment and would likely be at increased risk for decompensation otherwise.    Mental Status Exam:   Hygiene:   good  Cooperation:  Cooperative  Eye Contact:  Good  Psychomotor Behavior:  Appropriate  Affect:  Appropriate  Mood: normal  Speech:  Normal  Thought Process:  Goal directed  Thought Content:  Normal  Suicidal:  None  Homicidal:  None  Hallucinations:  None  Delusion:   None  Memory:  Intact  Orientation:  Person, Place, Time and Situation  Reliability:  good  Insight:  Good  Judgement:  Good  Impulse Control:  Fair  Physical/Medical Issues:  No      PHQ-Score Total:  PHQ-9 Total Score:      JUANCARLOS-7:       Patient's Support Network Includes:      Functional Status: Moderate impairment     Progress toward goal: Not at goal    Prognosis: Good with Ongoing Treatment            Impression/Formulation:    VISIT DIAGNOSIS:     ICD-10-CM ICD-9-CM   1. Post traumatic stress disorder (PTSD)  F43.10 309.81        Patient appeared alert and oriented.  Patient is voluntarily requesting to continue outpatient therapy at [facility].  Patient is receptive to assistance with maintaining a stable lifestyle.  Patient presents with history of PTSD.  Patient is agreeable to attend routine therapy sessions.  Patient expressed desire to maintain stability and participate in the therapeutic process.        Crisis Plan:  If symptoms/behaviors persist, Patient will present to the nearest hospital for an assessment. Advised patient of [facility] ER and assessment services.     Plan:   Patient will continue in individual outpatient therapy with focus on improved functioning and coping skills, maintaining stability, and avoiding decompensation and the need for higher level of care.    Patient will contact this office, call 911 or present to the nearest emergency room should suicidal or homicidal ideations occur. Provide Cognitive Behavioral Therapy and Solution Focused Therapy to improve functioning, maintain stability, and avoid decompensation and the need for higher level of care.     Return in about 1 weeks, or earlier if symptoms worsen or fail to improve.    Recommended Referrals: None        This document has been electronically signed by Kayla Frank LCSW  March 7, 2022 07:47 EST        Part of this note may be an electronic transcription/translation of  spoken language to printed text using the Dragon Dictation System.

## 2022-03-08 ENCOUNTER — TELEPHONE (OUTPATIENT)
Dept: INTERNAL MEDICINE | Facility: CLINIC | Age: 27
End: 2022-03-08

## 2022-03-08 NOTE — TELEPHONE ENCOUNTER
Patient had appointment scheduled by video visit. Patient been instructed multiple times she needed to be in office for follow-up appointment last instruction was given by Dr. Hoffman's nurse Yessica who spoke with patient by Dr. Hoffman refilled Ultram 1/22 and advised patient she would need to be seen in the clinic prior to further refills. Call patient today and let her know this information and with her I would like to set  her up for office visit and she declined stating she would go to the ER for control of her pain. I did advise she would need to have us in person visit for her follow-up fasting and suggested she make an annual physical exam appointment.

## 2022-03-09 ENCOUNTER — TELEMEDICINE (OUTPATIENT)
Dept: PSYCHIATRY | Facility: CLINIC | Age: 27
End: 2022-03-09

## 2022-03-09 DIAGNOSIS — F43.10 POST TRAUMATIC STRESS DISORDER (PTSD): Primary | ICD-10-CM

## 2022-03-09 PROCEDURE — 90837 PSYTX W PT 60 MINUTES: CPT | Performed by: SOCIAL WORKER

## 2022-03-09 NOTE — PROGRESS NOTES
[facility]   Follow-up Progress Note     Date: March 10, 2022  Time In: 9:37AM  Time Out: 10:34 AM      PROGRESS NOTE  Data:  Genesis Chapin is a 26 y.o. female presenting to [facility] for follow-up with Kayla Frank LCSW. The patient is seen remotely using [facility] My Chart. Patient is being seen via telehealth and stated they are in a secure environment for this session.  Patient was located in her home.  The patient's condition being diagnosed/treated is appropriate for telemedicine. The provider identified herself as well as her credentials. The patient and/or patients guardian consent to be seen remotely, and when consent is given they understand that the consent allows for patient identifiable information to be sent to a third party as needed. They may refuse to be seen remotely at any time. The electronic data is encrypted and password protected, and the patient has been advised of the potential risks to privacy not withstanding such measures.    Today Patient presented for follow-up appointment.  She completed PCL 5.  Patient expressed a lot of panic and anxiety today and appeared very overwhelmed.  Provider assisted patient to utilize deep breathing skills and to relax.  Patient was able to identify multiple unrealistic thoughts contributing to anxiety and panic.  Patient was able to calm down and review homework.  Patient did well on the patterns of problematic thinking homework.  Patient exhibited good insight into her tendencies and patterns of thinking that impact other areas of her life.  Patient and provider reviewed challenging beliefs worksheet and assigned for homework.    Clinical Maneuvering/Intervention: Cognitive processing therapy    Assisted Patient in processing above session content; acknowledged and normalized patient’s thoughts, feelings, and concerns.  Rationalized patient thought process regarding  stress and stuck points.  Discussed triggers associated with patient's PTSD.  Also discussed coping skills for patient to implement such as cognitive reprocessing.    Allowed Patient to freely discuss issues  without interruption or judgement with unconditional positive regard, active listening skills, and empathy. Therapist provided a safe, confidential environment to facilitate the development of a positive therapeutic relationship and encouraged open, honest communication. Assisted Patient in identifying risk factors which would indicate the need for higher level of care including thoughts to harm self or others and/or self-harming behavior and encouraged Patient to contact this office, call 911, or present to the nearest emergency room should any of these events occur. Discussed crisis intervention services and means to access. Patient adamantly and convincingly denies current suicidal or homicidal ideation or perceptual disturbance. Assisted Patient in processing session content; acknowledged and normalized Patient’s thoughts, feelings, and concerns by utilizing a person-centered approach in efforts to build appropriate rapport and a positive therapeutic relationship with open and honest communication. Therapist utilized dialectical behavior techniques to teach and model emotional regulation and relaxation methods. Therapist assisted Patient with identifying and implementing healthier coping strategies.     Assessment   Patient appears to be maintaining relative stability as compared to baseline.  Patient continues to struggle with PTSD.   As a result, they can be reasonably expected to continue to benefit from treatment and would likely be at increased risk for decompensation otherwise.    Mental Status Exam:   Hygiene:   good  Cooperation:  Cooperative  Eye Contact:  Good  Psychomotor Behavior:  Appropriate  Affect:  Appropriate  Mood: normal  Speech:  Normal  Thought Process:  Goal directed  Thought Content:   Normal  Suicidal:  None  Homicidal:  None  Hallucinations:  None  Delusion:  None  Memory:  Intact  Orientation:  Person, Place, Time and Situation  Reliability:  good  Insight:  Good  Judgement:  Good  Impulse Control:  Fair  Physical/Medical Issues:  No      PHQ-Score Total:  PHQ-9 Total Score:      JUANCARLOS-7:       Patient's Support Network Includes:      Functional Status: Moderate impairment     Progress toward goal: Not at goal    Prognosis: Good with Ongoing Treatment            Impression/Formulation:    VISIT DIAGNOSIS:     ICD-10-CM ICD-9-CM   1. Post traumatic stress disorder (PTSD)  F43.10 309.81        Patient appeared alert and oriented.  Patient is voluntarily requesting to continue outpatient therapy at [facility].  Patient is receptive to assistance with maintaining a stable lifestyle.  Patient presents with history of PTSD.  Patient is agreeable to attend routine therapy sessions.  Patient expressed desire to maintain stability and participate in the therapeutic process.        Crisis Plan:  If symptoms/behaviors persist, Patient will present to the nearest hospital for an assessment. Advised patient of [facility] ER and assessment services.     Plan:   Patient will continue in individual outpatient therapy with focus on improved functioning and coping skills, maintaining stability, and avoiding decompensation and the need for higher level of care.    Patient will contact this office, call 911 or present to the nearest emergency room should suicidal or homicidal ideations occur. Provide Cognitive Behavioral Therapy and Solution Focused Therapy to improve functioning, maintain stability, and avoid decompensation and the need for higher level of care.     Return in about 1 weeks, or earlier if symptoms worsen or fail to improve.    Recommended Referrals: None        This document has been electronically signed by Kayla Frank LCSW  March 10, 2022 13:56  EST        Part of this note may be an electronic transcription/translation of spoken language to printed text using the Dragon Dictation System.

## 2022-03-10 ENCOUNTER — TELEMEDICINE (OUTPATIENT)
Dept: PSYCHIATRY | Facility: CLINIC | Age: 27
End: 2022-03-10

## 2022-03-10 DIAGNOSIS — F41.0 PANIC DISORDER (EPISODIC PAROXYSMAL ANXIETY): ICD-10-CM

## 2022-03-10 DIAGNOSIS — F39 UNSPECIFIED MOOD (AFFECTIVE) DISORDER: Chronic | ICD-10-CM

## 2022-03-10 DIAGNOSIS — F41.9 ANXIETY DISORDER, UNSPECIFIED TYPE: Chronic | ICD-10-CM

## 2022-03-10 DIAGNOSIS — G47.9 SLEEPING DIFFICULTIES: ICD-10-CM

## 2022-03-10 DIAGNOSIS — R45.86 MOOD SWINGS: ICD-10-CM

## 2022-03-10 DIAGNOSIS — F43.10 POST TRAUMATIC STRESS DISORDER (PTSD): Primary | Chronic | ICD-10-CM

## 2022-03-10 DIAGNOSIS — R45.4 IRRITABILITY: ICD-10-CM

## 2022-03-10 PROCEDURE — 99214 OFFICE O/P EST MOD 30 MIN: CPT | Performed by: NURSE PRACTITIONER

## 2022-03-10 RX ORDER — QUETIAPINE FUMARATE 25 MG/1
25 TABLET, FILM COATED ORAL NIGHTLY
Qty: 30 TABLET | Refills: 0 | Status: SHIPPED | OUTPATIENT
Start: 2022-03-10 | End: 2022-04-04

## 2022-03-10 RX ORDER — LAMOTRIGINE 100 MG/1
100 TABLET ORAL 2 TIMES DAILY
Qty: 60 TABLET | Refills: 0 | Status: SHIPPED | OUTPATIENT
Start: 2022-03-10 | End: 2022-04-04 | Stop reason: SDUPTHER

## 2022-03-10 RX ORDER — BUSPIRONE HYDROCHLORIDE 10 MG/1
10 TABLET ORAL 2 TIMES DAILY
Qty: 60 TABLET | Refills: 0 | Status: SHIPPED | OUTPATIENT
Start: 2022-03-10 | End: 2022-04-04 | Stop reason: SDUPTHER

## 2022-03-10 RX ORDER — VENLAFAXINE HYDROCHLORIDE 150 MG/1
150 CAPSULE, EXTENDED RELEASE ORAL DAILY
Qty: 30 CAPSULE | Refills: 0 | Status: SHIPPED | OUTPATIENT
Start: 2022-03-10 | End: 2022-04-04 | Stop reason: SDUPTHER

## 2022-03-10 NOTE — PROGRESS NOTES
"This provider is located at the [facility] (through [facility]), [address], [place], [address] using a secure Net[employer]er[employer] Video Visit through [employer]. Patient is being seen remotely via telehealth at their home address in [place], and stated they are in a secure environment for this session. The patient's condition being diagnosed/treated is appropriate for telemedicine. The provider identified herself as well as her credentials. The patient, and/or patients guardian, consent to be seen remotely, and when consent is given they understand that the consent allows for patient identifiable information to be sent to a third party as needed. They may refuse to be seen remotely at any time. The electronic data is encrypted and password protected, and the patient and/or guardian has been advised of the potential risks to privacy not withstanding such measures.    You have chosen to receive care through a telehealth visit.  Do you consent to use a video/audio connection for your medical care today? Yes     Subjective   Genesis Chapin is a 26 y.o. female who is here today for medication management follow up.    Chief Complaint: Depression, anxiety, panic attacks, sleeping difficulties    History of Present Illness:  The patient describes her mood as \"better\" over the last few weeks.  The patient states that she has been doing really well over the past few weeks.  Patient states that her mood and anxiety have been significantly improved since beginning the Seroquel.  The patient states that she was unable to tolerate taking it twice daily, but endorses that she is tolerated taking it and a single 25 mg dose at bedtime much better.  The patient states that she has been sleeping much better since beginning the Seroquel as well.  The patient states that she has not been able to take it over the past few days due to hurting her back and having to take pain medication.  The patient " states that with the combination of the Seroquel and pain medication it was causing her to be too sedated.  The patient states that she is planning to start her Seroquel back tonight.  The patient states that she has been sleeping as well over the past few days since she has not been taking her Seroquel.  Patient states that overall she has been very pleased with how the medications are working for her and endorses that she does not want to make any changes or adjustments to her current psychiatric medication regimen.  Patient states that both she and her  have noticed a significant improvement in her anxiety and irritability since beginning the Seroquel.  The patient states that she does have periods where her anxiety can increase and be more severe, but endorses that it does not staying elevated and as severe as it was prior to beginning the Seroquel.  The patient states that overall she is tolerated the Seroquel well and it is not causing her to feel too groggy or drowsy upon awakening.  The patient reports her appetite as good.  The patient reports her sleep as good.  The patient denies any nightmares or bad dreams.  The patient denies any abnormal muscle movements or tics.  The patient rates her depression at a 4/10 on a 0-10 scale, with 10 being the worst.  The patient rates her anxiety at a 8/10 on a 0-10 scale, with 10 being the worst.  The patient rates hopelessness at a 0/10 on a 0-10 scale with 10 being the worst.  The patient would like to not adjust or change her medications at this visit.  The patient denies suicidal ideations and homicidal ideations, and is convincing.  The patient denies any auditory hallucinations or visual hallucinations.  The patient does not endorse significant symptoms consistent with a psychotic illness.              LMP:  2-3 weeks ago.  The patient denies any chance of pregnancy at this time.  The patient was educated that her prescribed medications can have potential  risk to a developing fetus. The patient is advised to contact this APRN/this office if she becomes pregnant or plans to become pregnant.  Pt verbalizes understanding and acknowledged agreement with this plan in her own words.        Past Psychiatric Medications:  Lexapro 10 mg daily - effective for depression, but ineffective for anxiety   Klonopin 0.5 mg three times daily as needed for anxiety/panic  - effective but discontinued due to marijuana use  Hydroxyzine 25 mg three times daily as needed for anxiety/panic - ineffective      The following portions of the patient's history were reviewed and updated as appropriate: allergies, current medications, past family history, past medical history, past social history, past surgical history and problem list.    Review of Systems   Constitutional: Negative for activity change, appetite change, fatigue and unexpected weight change.   Psychiatric/Behavioral: Positive for dysphoric mood. Negative for agitation, behavioral problems, confusion, decreased concentration, hallucinations, self-injury, sleep disturbance and suicidal ideas. The patient is nervous/anxious. The patient is not hyperactive.        Objective   Physical Exam  Constitutional:       Appearance: Normal appearance.   Neurological:      Mental Status: She is alert.   Psychiatric:         Attention and Perception: Attention and perception normal.         Mood and Affect: Affect normal. Mood is anxious.         Speech: Speech normal.         Behavior: Behavior normal. Behavior is cooperative.         Thought Content: Thought content normal. Thought content does not include homicidal or suicidal ideation. Thought content does not include homicidal or suicidal plan.         Cognition and Memory: Cognition and memory normal.         Judgment: Judgment is impulsive.       not currently breastfeeding.    The patient was seen remotely today via a MyChart Video Visit through [facility].  Unable to obtain vital signs due to  nature of remote visit.  Height stated at 67 inches.  Weight stated at 250 pounds.     Allergies   Allergen Reactions   • Amoxicillin Nausea And Vomiting   • Penicillins Nausea And Vomiting       No results found for this or any previous visit (from the past 2016 hour(s)).    Current Medications:   Current Outpatient Medications   Medication Sig Dispense Refill   • propranolol (INDERAL) 10 MG tablet Take 1 tablet by mouth 2 (Two) Times a Day As Needed (anxiety/panic). 60 tablet 0   • busPIRone (BUSPAR) 10 MG tablet Take 1 tablet by mouth 2 (Two) Times a Day. 60 tablet 0   • lamoTRIgine (LaMICtal) 100 MG tablet Take 1 tablet by mouth 2 (Two) Times a Day. 60 tablet 0   • QUEtiapine (SEROquel) 25 MG tablet Take 1 tablet by mouth Every Night. 30 tablet 0   • venlafaxine XR (EFFEXOR-XR) 150 MG 24 hr capsule Take 1 capsule by mouth Daily. 30 capsule 0     No current facility-administered medications for this visit.       Mental Status Exam:   Hygiene:   good  Cooperation:  Cooperative  Eye Contact:  Good  Psychomotor Behavior:  Appropriate  Affect:  Full range  Hopelessness: Denies  Speech:  Normal  Thought Process:  Goal directed  Thought Content:  Normal  Suicidal:  None  Homicidal:  None  Hallucinations:  None  Delusion:  None  Memory:  Intact  Orientation:  Person, Place, Time and Situation  Reliability:  good  Insight:  Fair  Judgement:  Fair  Impulse Control:  Fair  Physical/Medical Issues:  Yes See medical history        Assessment/Plan   Diagnoses and all orders for this visit:    1. Post traumatic stress disorder (PTSD) (Primary)  -     venlafaxine XR (EFFEXOR-XR) 150 MG 24 hr capsule; Take 1 capsule by mouth Daily.  Dispense: 30 capsule; Refill: 0  -     QUEtiapine (SEROquel) 25 MG tablet; Take 1 tablet by mouth Every Night.  Dispense: 30 tablet; Refill: 0  -     lamoTRIgine (LaMICtal) 100 MG tablet; Take 1 tablet by mouth 2 (Two) Times a Day.  Dispense: 60 tablet; Refill: 0  -     busPIRone (BUSPAR) 10 MG  tablet; Take 1 tablet by mouth 2 (Two) Times a Day.  Dispense: 60 tablet; Refill: 0    2. Unspecified mood (affective) disorder (HCC)  -     venlafaxine XR (EFFEXOR-XR) 150 MG 24 hr capsule; Take 1 capsule by mouth Daily.  Dispense: 30 capsule; Refill: 0  -     QUEtiapine (SEROquel) 25 MG tablet; Take 1 tablet by mouth Every Night.  Dispense: 30 tablet; Refill: 0  -     lamoTRIgine (LaMICtal) 100 MG tablet; Take 1 tablet by mouth 2 (Two) Times a Day.  Dispense: 60 tablet; Refill: 0  -     busPIRone (BUSPAR) 10 MG tablet; Take 1 tablet by mouth 2 (Two) Times a Day.  Dispense: 60 tablet; Refill: 0    3. Anxiety disorder, unspecified type  -     venlafaxine XR (EFFEXOR-XR) 150 MG 24 hr capsule; Take 1 capsule by mouth Daily.  Dispense: 30 capsule; Refill: 0  -     QUEtiapine (SEROquel) 25 MG tablet; Take 1 tablet by mouth Every Night.  Dispense: 30 tablet; Refill: 0  -     lamoTRIgine (LaMICtal) 100 MG tablet; Take 1 tablet by mouth 2 (Two) Times a Day.  Dispense: 60 tablet; Refill: 0  -     busPIRone (BUSPAR) 10 MG tablet; Take 1 tablet by mouth 2 (Two) Times a Day.  Dispense: 60 tablet; Refill: 0    4. Panic disorder (episodic paroxysmal anxiety)  -     venlafaxine XR (EFFEXOR-XR) 150 MG 24 hr capsule; Take 1 capsule by mouth Daily.  Dispense: 30 capsule; Refill: 0  -     QUEtiapine (SEROquel) 25 MG tablet; Take 1 tablet by mouth Every Night.  Dispense: 30 tablet; Refill: 0  -     lamoTRIgine (LaMICtal) 100 MG tablet; Take 1 tablet by mouth 2 (Two) Times a Day.  Dispense: 60 tablet; Refill: 0  -     busPIRone (BUSPAR) 10 MG tablet; Take 1 tablet by mouth 2 (Two) Times a Day.  Dispense: 60 tablet; Refill: 0    5. Sleeping difficulties  -     QUEtiapine (SEROquel) 25 MG tablet; Take 1 tablet by mouth Every Night.  Dispense: 30 tablet; Refill: 0    6. Mood swings  -     QUEtiapine (SEROquel) 25 MG tablet; Take 1 tablet by mouth Every Night.  Dispense: 30 tablet; Refill: 0  -     lamoTRIgine (LaMICtal) 100 MG tablet;  Take 1 tablet by mouth 2 (Two) Times a Day.  Dispense: 60 tablet; Refill: 0    7. Irritability  -     QUEtiapine (SEROquel) 25 MG tablet; Take 1 tablet by mouth Every Night.  Dispense: 30 tablet; Refill: 0  -     lamoTRIgine (LaMICtal) 100 MG tablet; Take 1 tablet by mouth 2 (Two) Times a Day.  Dispense: 60 tablet; Refill: 0              Visit Diagnoses:    ICD-10-CM ICD-9-CM   1. Post traumatic stress disorder (PTSD)  F43.10 309.81   2. Unspecified mood (affective) disorder (HCC)  F39 296.90   3. Anxiety disorder, unspecified type  F41.9 300.00   4. Panic disorder (episodic paroxysmal anxiety)  F41.0 300.01   5. Sleeping difficulties  G47.9 780.50   6. Mood swings  R45.86 799.24   7. Irritability  R45.4 799.22       GOALS:  Short Term Goals: Patient will be compliant with medication, and patient will have no significant medication related side effects.  Patient will be engaged in psychotherapy as indicated.  Patient will report subjective improvement of symptoms.  Long term goals: To stabilize mood and treat/improve subjective symptoms, the patient will stay out of the hospital, the patient will be at an optimal level of functioning, and the patient will take all medications as prescribed.  The patient verbalized understanding and agreement with goals that were mutually set.      SUICIDE RISK ASSESSMENT: Unalterable demographics and a history of mental health intervention indicate this patient is in a high risk category compared to the general population. At present, the patient denies active SI/HI, intentions, or plans at this time and agrees to seek immediate care should such thoughts develop. The patient verbalizes understanding of how to access emergency care if needed and agrees to do so. Consideration of suicide risk and protective factors such as history, current presentation, individual strengths and weaknesses, psychosocial and environmental stressors and variables, psychiatric illness and symptoms,  medical conditions and pain, took place in this interview. Based on those considerations, the patient is determined: within individual baseline and presenting no imminent risk for suicide or homicide. Other recommendations: The patient does not meet the criteria for inpatient admission and is not a safety risk to self or others at today's visit. Inpatient treatment offers no significant advantages over outpatient treatment for this patient at today's visit.      SAFETY PLAN:  Patient was given ample time for questions and fully participated in treatment planning.  Patient was encouraged to call the clinic with any questions or concerns.  Patient was informed of access to emergency care. If patient were to develop any significant symptomatology, suicidal ideation, homicidal ideation, any concerns, or feel unsafe at any time they are to call the clinic and if unable to get immediate assistance should immediately call 911 or go to the nearest emergency room.  The patient is advised to remove or secure (lock away) all lethal weapons (including guns) and sharps (including razors, scissors, knives, etc.).  All medications (including any prescribed and any over the counter medications) should be stored in a safe and secured location that is not obtainable by children/adolescents.  Patient was given an opportunity and encouraged to ask questions about their medication, illness, and treatment. Patient contracted verbally for the following: If you are experiencing an emotional crisis or have thoughts of harming yourself or others, please go to your nearest local emergency room or call 911. Will continue to re-assess medication response and side effects frequently to establish efficacy and ensure safety. Risks, any black box warnings, side effects, off label usage, and benefits of medication and treatment discussed with patient, along with potential adverse side effects of current and/or newly prescribed medication,  alternative treatment options, and OTC medications.  Patient verbalized understanding of potential risks, any off label use of medication, any black box warnings, and any side effects in their own words. The patient verbalized understanding and agreed to comply with the safety plan discussed in their own words.  Patient given the number to the office. Number also available to the 24- hour suicide hotline.      TREATMENT PLAN/GOALS: Continue medications and treatment plan as indicated. Treatment and medication options discussed during today's visit. Patient acknowledged and verbally consented to continue with current treatment plan and was educated on the importance of compliance with treatment and follow-up appointments.        -Continue Seroquel 25 mg nightly for mood/anxiety and sleeping difficulties  -Continue Lamictal 100 mg twice daily for mood stabilization  -Continue Buspar 10 mg twice daily as adjunct for mood/anxiety   -Continue Effexor  mg daily for mood and anxiety  -Continue propanolol 10 mg twice daily as needed for anxiety/panic  -Continue psychotherapy with Kayla Frank LCSW  -Rule out bipolar/mood disorder        MEDICATION ISSUES: Discussed medication options and treatment plan of prescribed medication, any off label use of medication, as well as the risks, benefits, any black box warnings including increased suicidality, and side effects including but not limited to potential falls, dizziness, possible impaired driving, GI side effects (change in appetite, abdominal discomfort, nausea, vomiting, diarrhea, and/or constipation), dry mouth, somnolence, sedation, insomnia, activation, agitation, irritation, tremors, abnormal muscle movements or disorders, tardive dyskinesia, akathisia, asthenia, headache, sweating, possible bruising or rare bleeding, electrolyte and/or fluid abnormalities, change in blood pressure/heart rate/and or heart rhythm, hypotension, sexual dysfunction, rare impulse  control problems, rare seizures, rare neuroleptic malignant syndrome, increased risk of death and cerebrovascular events, change in blood glucose and increased risk for diabetes, change in triglycerides and cholesterol and increased risk for dyslipidemia,  weight gain, weight gain that can become problematic to health, skin conditions and reactions, and metabolic adversities among others. Patient and/or guardian are agreeable to call the office with any worsening of symptoms or onset of side effects, or if any concerns or questions arise.  The contact information for the office is made available to the patient and/or guardian. Patient and/or guardian are agreeable to call 911 or go to the nearest ER should they begin having any SI/HI, or if any urgent concerns arise. No medication side effects or related complaints today.    This APRN has discussed with the patient/guardian about the possibility of serotonin syndrome when certain medications are taken together, as is the case with this patient.  This APRN has provided the patient/guardian with a list of symptoms of serotonin syndrome including symptoms of autonomic instability, altered sensorium, confusion, restlessness, agitation, myoclonus, hyperreflexia, hyperthermia, diaphoresis, tremor, chills, diarrhea and cramps, ataxia, headache, migraines, seizures, and insomnia; which could lead to permanent hyperthermic brain damage, cardiovascular collapse, coma, or even death.  The patient/guardian are instructed to stop medications immediately and either contact this APRN/this office during regular office hours, or go to the emergency department/call 911, if they begin to experience any of the symptoms discussed.  The benefits and risks of the current medication regimen are discussed with the patient/guardian, and they feel that the benefits out weigh the risks.  The patient/guardian verbalized understanding and agreement in their own words.    This APRN has discussed  the benefits and risks of taking Lamictal (Lamotrigine).  The side effects of Lamictal can include a benign rash, blurred or double vision, dizziness, ataxia, sedation, headache, tremor, insomnia, poor coordination, fatigue,  nausea, vomiting, dyspepsia, rhinitis, infection, pharyngitis, asthenia, a rare but serious rash, rare multi-organ failure associated with Torres-Sander Syndrome, toxic epidermal necrolysis, drug hypersensitivity syndrome, rare blood dyscrasias, rare aseptic meningitis, rare sudden unexplained deaths in people with epilepsy, withdrawal seizures upon abrupt withdrawal, and rare activation of suicidal ideation and behavior (suicidality).  This APRN has discussed that a very slow dose titration when starting, or changing doses, of Lamictal may reduce the incidence of skin rash and other side effects.  The dosage should not be titrated upwards or increased faster than recommended due to the possibility of the discussed side effects and risk of development of a skin rash (which can become life threatening).    This APRN has also discussed that if the patient stops taking the Lamictal for 5 days or longer, it will be necessary to restart the drug with an initial dose titration, as rashes have been reported on reexposure.  If the patient/guardian and Provider decide to stop the Lamictal, the patient/guardian will follow the directions of this APRN/this office as a guided taper over about two weeks is appropriate due to the risk of relapse in bipolar disorder with those with bipolar disorder, the risk of seizures in those with epilepsy, and discontinuation symptoms upon rapid discontinuation of Lamictal.    The patient/guardian verbalizes understanding of benefits and risks as discussed, the patient/guardian feels the benefits outweigh the risks and is agreeable to continue/take Lamictal as discussed.  The patient/guardian is advised should any side effects or rash develops they are to stop the  Lamictal immediately and contact this APRN/this office or go to the emergency department immediately.  The patient/guardian verbalizes understanding and agreement with treatment plan in their own words.                        [facility] No Show Policy:  We understand unexpected circumstances arise; however, anytime you miss your appointment we are unable to provide you appropriate care.  In addition, each appointment missed could have been used to provide care for others.  We ask that you call at least 24 hours in advance to cancel or reschedule an appointment.  We would like to take this opportunity to remind you of our policy stating patients who miss THREE or more appointments without cancelling or rescheduling 24 hours in advance of the appointment may be subject to cancellation of any further visits with our clinic and recommendation to seek in-person services/visits.    Please call [phone] to reschedule your appointment. If there are reasons that make it difficult for you to keep the appointments, please call and let us know how we can help.  Please understand that medication prescribing will not continue without seeing your provider.      [facility]'s No Show Policy reviewed with patient at today's visit. Patient verbalized understanding of this policy. Discussed with patient that in the event that there are three or more no show visits, it will be recommended that they pursue in-person services/visits as noncompliance with telehealth visits indicates that patient is not an appropriate candidate for telemedicine and would likely be more appropriate for in-person services/visits. Patient verbalizes understanding and is agreeable to this.              MEDS ORDERED DURING VISIT:  New Medications Ordered This Visit   Medications   • venlafaxine XR (EFFEXOR-XR) 150 MG 24 hr capsule     Sig: Take 1 capsule by mouth Daily.     Dispense:  30 capsule     Refill:  0    • QUEtiapine (SEROquel) 25 MG tablet     Sig: Take 1 tablet by mouth Every Night.     Dispense:  30 tablet     Refill:  0   • lamoTRIgine (LaMICtal) 100 MG tablet     Sig: Take 1 tablet by mouth 2 (Two) Times a Day.     Dispense:  60 tablet     Refill:  0   • busPIRone (BUSPAR) 10 MG tablet     Sig: Take 1 tablet by mouth 2 (Two) Times a Day.     Dispense:  60 tablet     Refill:  0       Return in about 4 weeks (around 4/7/2022), or if symptoms worsen or fail to improve, for Recheck.        Patient will follow-up in 4 weeks, highly encouraged the patient if she had any questions or concerns to contact the behavioral health virtual clinic for sooner appointment patient verbalized understanding.      Functional Status: Moderate impairment     Prognosis: Fair with Ongoing Treatment             This document has been electronically signed by LENA Barajas  March 10, 2022 14:33 EST       Some of the data in this electronic note has been brought forward from a previous encounter, any necessary changes have been made, it has been reviewed by this APRN, and it is accurate.      Part of this note may be an electronic transcription/translation of spoken language to printed text using the Dragon Dictation System.

## 2022-03-15 ENCOUNTER — TELEPHONE (OUTPATIENT)
Dept: PSYCHIATRY | Facility: CLINIC | Age: 27
End: 2022-03-15

## 2022-03-15 NOTE — TELEPHONE ENCOUNTER
PATIENT WAS WONDERING IF SHE COULD TAKE HER SEROQUEL HALF IN THE MORNING AND HALF AT NIGHT . SHE ALSO ASK IF SHE COULD TAKE HER BUSPAR AND LAMICTAL AT DIFFERENT TIMES.    PATIENT IS ALSO REQUESTING A LOW DOSE OF AN ADHD MEDICATION.      PLEASE ADVISE .

## 2022-03-16 ENCOUNTER — TELEMEDICINE (OUTPATIENT)
Dept: PSYCHIATRY | Facility: CLINIC | Age: 27
End: 2022-03-16

## 2022-03-16 DIAGNOSIS — F43.10 POST TRAUMATIC STRESS DISORDER (PTSD): Primary | ICD-10-CM

## 2022-03-16 PROCEDURE — 90837 PSYTX W PT 60 MINUTES: CPT | Performed by: SOCIAL WORKER

## 2022-03-16 NOTE — PROGRESS NOTES
[facility]   Follow-up Progress Note     Date: March 28, 2022  Time In: 9:38AM  Time Out: 10:35 AM      PROGRESS NOTE  Data:  Genesis Chapin is a 26 y.o. female presenting to [facility] for follow-up with Kayla Frank LCSW. The patient is seen remotely using [facility] My Chart. Patient is being seen via telehealth and stated they are in a secure environment for this session.  Patient is located in her home.  The patient's condition being diagnosed/treated is appropriate for telemedicine. The provider identified herself as well as her credentials. The patient and/or patients guardian consent to be seen remotely, and when consent is given they understand that the consent allows for patient identifiable information to be sent to a third party as needed. They may refuse to be seen remotely at any time. The electronic data is encrypted and password protected, and the patient has been advised of the potential risks to privacy not withstanding such measures.    Today Patient presented for follow-up appointment.  Patient completed PCL 5 with a score of 44.  Patient and provider explored avoidance and nonadherence of homework assignments.  Patient recognizes avoidance at times and discussed life stressors.  Patient was able to complete challenging related to safety.  Patient and provider reviewed and identified about safety-related stuck points.  Patient and provider reviewed trust module and assigned homework.    Clinical Maneuvering/Intervention: CPT    Assisted Patient in processing above session content; acknowledged and normalized patient’s thoughts, feelings, and concerns.  Rationalized patient thought process regarding stuck points.  Discussed triggers associated with patient's PTSD.  Also discussed coping skills for patient to implement such as cognitive reprocessing.    Allowed Patient to freely discuss issues  without interruption  or judgement with unconditional positive regard, active listening skills, and empathy. Therapist provided a safe, confidential environment to facilitate the development of a positive therapeutic relationship and encouraged open, honest communication. Assisted Patient in identifying risk factors which would indicate the need for higher level of care including thoughts to harm self or others and/or self-harming behavior and encouraged Patient to contact this office, call 911, or present to the nearest emergency room should any of these events occur. Discussed crisis intervention services and means to access. Patient adamantly and convincingly denies current suicidal or homicidal ideation or perceptual disturbance. Assisted Patient in processing session content; acknowledged and normalized Patient’s thoughts, feelings, and concerns by utilizing a person-centered approach in efforts to build appropriate rapport and a positive therapeutic relationship with open and honest communication. Therapist utilized dialectical behavior techniques to teach and model emotional regulation and relaxation methods. Therapist assisted Patient with identifying and implementing healthier coping strategies.     Assessment   Patient appears to be maintaining relative stability as compared to baseline.  Patient continues to struggle with PTSD.   As a result, they can be reasonably expected to continue to benefit from treatment and would likely be at increased risk for decompensation otherwise.    Mental Status Exam:   Hygiene:   good  Cooperation:  Cooperative  Eye Contact:  Good  Psychomotor Behavior:  Appropriate  Affect:  Appropriate  Mood: normal  Speech:  Normal  Thought Process:  Goal directed  Thought Content:  Normal  Suicidal:  None  Homicidal:  None  Hallucinations:  None  Delusion:  None  Memory:  Intact  Orientation:  Person, Time and Situation  Reliability:  good  Insight:  Good  Judgement:  Good  Impulse Control:   Good  Physical/Medical Issues:  No      PHQ-Score Total:  PHQ-9 Total Score:      JUANCARLOS-7:       Patient's Support Network Includes:      Functional Status: Moderate impairment     Progress toward goal: Not at goal    Prognosis: Good with Ongoing Treatment            Impression/Formulation:    VISIT DIAGNOSIS:     ICD-10-CM ICD-9-CM   1. Post traumatic stress disorder (PTSD)  F43.10 309.81        Patient appeared alert and oriented.  Patient is voluntarily requesting to continue outpatient therapy at [facility].  Patient is receptive to assistance with maintaining a stable lifestyle.  Patient presents with history of PTSD.  Patient is agreeable to attend routine therapy sessions.  Patient expressed desire to maintain stability and participate in the therapeutic process.        Crisis Plan:  If symptoms/behaviors persist, Patient will present to the nearest hospital for an assessment. Advised patient of [facility] and assessment services.     Plan:   Patient will continue in individual outpatient therapy with focus on improved functioning and coping skills, maintaining stability, and avoiding decompensation and the need for higher level of care.    Patient will contact this office, call 911 or present to the nearest emergency room should suicidal or homicidal ideations occur. Provide Cognitive Behavioral Therapy and Solution Focused Therapy to improve functioning, maintain stability, and avoid decompensation and the need for higher level of care.     Return in about 1 weeks, or earlier if symptoms worsen or fail to improve.    Recommended Referrals: None        This document has been electronically signed by Kayla Frank LCSW  March 28, 2022 07:47 EDT        Part of this note may be an electronic transcription/translation of spoken language to printed text using the Dragon Dictation System.

## 2022-03-23 ENCOUNTER — TELEMEDICINE (OUTPATIENT)
Dept: PSYCHIATRY | Facility: CLINIC | Age: 27
End: 2022-03-23

## 2022-03-23 DIAGNOSIS — F43.10 POST TRAUMATIC STRESS DISORDER (PTSD): Primary | ICD-10-CM

## 2022-03-23 PROCEDURE — 90837 PSYTX W PT 60 MINUTES: CPT | Performed by: SOCIAL WORKER

## 2022-03-30 ENCOUNTER — TELEMEDICINE (OUTPATIENT)
Dept: PSYCHIATRY | Facility: CLINIC | Age: 27
End: 2022-03-30

## 2022-03-30 DIAGNOSIS — F43.10 POST TRAUMATIC STRESS DISORDER (PTSD): Primary | ICD-10-CM

## 2022-03-30 PROCEDURE — 90837 PSYTX W PT 60 MINUTES: CPT | Performed by: SOCIAL WORKER

## 2022-03-31 ENCOUNTER — TELEPHONE (OUTPATIENT)
Dept: PSYCHIATRY | Facility: CLINIC | Age: 27
End: 2022-03-31

## 2022-03-31 NOTE — TELEPHONE ENCOUNTER
PT IS FEELING OVER WHELMED TODAY AND HASN'T BEEN SLEEPING GOOD. HASN'T BEEN SLEEPING GOOD IN 2 WEEKS.  HAD SI THOUGHTS LAST NIGHT.  WANTS TO ADD ON MEDICATION FOR DEPRESSION.  STARTED SEROQUEL ABOUT A MONTH AGO. DON'T KNOW IF THAT IS WHAT IS MAKING HER FEEL UP AND DOWN. HASN'T BEEN TAKING IT CORRECTLY DIDN'T WANT TO SLEEP TO HARD.  DIDN'T TAKE IT AT THE SAME TIME.  NEVER MORE THAN SHE SHOULD OR LESS THAN SHE SHOULD.  SOMETIMES PT TAKE IT HALF IN THE MORNING AND HALF AT NIGHT AND THEN SOMETIMES SHE TAKES IT HALF IN THE MORNING BUT NOT AT NIGHT.  CONSINTED FOR SAFETY. PT STATES SHE IS NOT HAVING ANY SI AT THIS TIME.    ADVISED IF TO GO TO LOCAL ED IF PT STARTS HAVING ANY SI.

## 2022-03-31 NOTE — TELEPHONE ENCOUNTER
MADE PT AWARE.    ADVISED PT IF ANY SI/HI TO GO TO LOCAL ED.  PT VERBALLY UNDERSTOOD.    SCHEDULED PT WITH HER THERAPIST FOR 4/1/2022.

## 2022-04-01 ENCOUNTER — TELEMEDICINE (OUTPATIENT)
Dept: PSYCHIATRY | Facility: CLINIC | Age: 27
End: 2022-04-01

## 2022-04-01 DIAGNOSIS — F43.10 POST TRAUMATIC STRESS DISORDER (PTSD): Primary | ICD-10-CM

## 2022-04-01 PROCEDURE — 90834 PSYTX W PT 45 MINUTES: CPT | Performed by: SOCIAL WORKER

## 2022-04-01 NOTE — PROGRESS NOTES
[facility]   Follow-up Progress Note     Date: April 1, 2022  Time In: 10:34AM  Time Out: 11:19 AM      PROGRESS NOTE  Data:  Genesis Chapin is a 26 y.o. female presenting to [facility] for follow-up with Kayla Frank LCSW. The patient is seen remotely using [facility] My Chart. Patient is being seen via telehealth and stated they are in a secure environment for this session.  Patient is located in her home.  The patient's condition being diagnosed/treated is appropriate for telemedicine. The provider identified herself as well as her credentials. The patient and/or patients guardian consent to be seen remotely, and when consent is given they understand that the consent allows for patient identifiable information to be sent to a third party as needed. They may refuse to be seen remotely at any time. The electronic data is encrypted and password protected, and the patient has been advised of the potential risks to privacy not withstanding such measures.    Today Patient presented for follow-up.  Patient reported a couple days ago she called the suicide prevention line due to thoughts of suicide.  She stated she was trying to sleep and cannot get to sleep.  She stated she took 2 Seroquel instead of 1 and she still could not sleep.  Patient stated she was just laying on the couch and began thinking of ways she would kill herself.  She stated she is not going to do it and does not want to die but she cannot stop thinking of how she was a burden to people.  Patient and provider processed thoughts and emotions and challenged maladaptive thinking.  Patient and provider practiced ways to continue replacing the negative thought patterns.  Patient identified intensity of emotions when she was having these thoughts versus now.  Patient and provider discussed ways she will keep herself safe and how she will reach out for support.  Patient and  provider discussed distress tolerance and mindfulness to let emotions pass.  Patient agreed if thoughts of harming self or wanting to die increase that she will go to the emergency room.  Patient identified avoidance of trauma therapy homework impacted these feelings.  Patient identified that when she does the therapy homework and she challenges stuck points she is able to work through them.    Clinical Maneuvering/Intervention: Safety planning and crisis      Assisted Patient in processing above session content; acknowledged and normalized patient’s thoughts, feelings, and concerns.  Rationalized patient thought process regarding emotional distress.  Discussed triggers associated with patient's PTSD.  Also discussed coping skills for patient to implement such as increased support, cognitive reprocessing, thought replacement.    Allowed Patient to freely discuss issues  without interruption or judgement with unconditional positive regard, active listening skills, and empathy. Therapist provided a safe, confidential environment to facilitate the development of a positive therapeutic relationship and encouraged open, honest communication. Assisted Patient in identifying risk factors which would indicate the need for higher level of care including thoughts to harm self or others and/or self-harming behavior and encouraged Patient to contact this office, call 911, or present to the nearest emergency room should any of these events occur. Discussed crisis intervention services and means to access. Patient adamantly and convincingly denies current suicidal or homicidal ideation or perceptual disturbance. Assisted Patient in processing session content; acknowledged and normalized Patient’s thoughts, feelings, and concerns by utilizing a person-centered approach in efforts to build appropriate rapport and a positive therapeutic relationship with open and honest communication. Therapist utilized dialectical behavior  techniques to teach and model emotional regulation and relaxation methods. Therapist assisted Patient with identifying and implementing healthier coping strategies.     Assessment   Patient appears to be maintaining relative stability as compared to baseline.  Patient continues to struggle with PTSD.   As a result, they can be reasonably expected to continue to benefit from treatment and would likely be at increased risk for decompensation otherwise.    Mental Status Exam:   Hygiene:   good  Cooperation:  Cooperative  Eye Contact:  Good  Psychomotor Behavior:  Appropriate  Affect:  Appropriate  Mood: depressed  Speech:  Normal  Thought Process:  Goal directed  Thought Content:  Normal  Suicidal:  Suicidal Ideation  Homicidal:  None  Hallucinations:  None  Delusion:  None  Memory:  Intact  Orientation:  Person, Place, Time and Situation  Reliability:  good  Insight:  Good  Judgement:  Good  Impulse Control:  Good  Physical/Medical Issues:  No      PHQ-Score Total:  PHQ-9 Total Score:      JUANCARLOS-7:       Patient's Support Network Includes:      Functional Status: Moderate impairment     Progress toward goal: Not at goal    Prognosis: Good with Ongoing Treatment            Impression/Formulation:    VISIT DIAGNOSIS:     ICD-10-CM ICD-9-CM   1. Post traumatic stress disorder (PTSD)  F43.10 309.81        Patient appeared alert and oriented.  Patient is voluntarily requesting to continue outpatient therapy at [facility].  Patient is receptive to assistance with maintaining a stable lifestyle.  Patient presents with history of PTSD.  Patient is agreeable to attend routine therapy sessions.  Patient expressed desire to maintain stability and participate in the therapeutic process.        Crisis Plan:  Symptoms and/or behaviors to indicate a crisis: Feeling sad or low, Lack of sleep, Thinking about suicide and Self-doubt    What calming techniques or other strategies will patient use  to de-esclate and stay safe: slow down, breathe, visualize calming self, think it though, listen to music, change focus, take a walk    Who is one person patient can contact to assist with de-escalation?     If symptoms/behaviors persist, Patient will present to the nearest hospital for an assessment. Advised patient of [facility] and assessment services.     Plan:   Patient will continue in individual outpatient therapy with focus on improved functioning and coping skills, maintaining stability, and avoiding decompensation and the need for higher level of care.    Patient will contact this office, call 911 or present to the nearest emergency room should suicidal or homicidal ideations occur. Provide Cognitive Behavioral Therapy and Solution Focused Therapy to improve functioning, maintain stability, and avoid decompensation and the need for higher level of care.     Return in about 1 weeks, or earlier if symptoms worsen or fail to improve.    Recommended Referrals: None        This document has been electronically signed by Kayla Frank LCSW  April 1, 2022 11:30 EDT        Part of this note may be an electronic transcription/translation of spoken language to printed text using the Dragon Dictation System.

## 2022-04-04 ENCOUNTER — TELEMEDICINE (OUTPATIENT)
Dept: PSYCHIATRY | Facility: CLINIC | Age: 27
End: 2022-04-04

## 2022-04-04 DIAGNOSIS — F41.9 ANXIETY DISORDER, UNSPECIFIED TYPE: Chronic | ICD-10-CM

## 2022-04-04 DIAGNOSIS — R45.4 IRRITABILITY: ICD-10-CM

## 2022-04-04 DIAGNOSIS — F39 UNSPECIFIED MOOD (AFFECTIVE) DISORDER: Primary | Chronic | ICD-10-CM

## 2022-04-04 DIAGNOSIS — G47.9 SLEEPING DIFFICULTIES: ICD-10-CM

## 2022-04-04 DIAGNOSIS — F41.0 PANIC DISORDER (EPISODIC PAROXYSMAL ANXIETY): ICD-10-CM

## 2022-04-04 DIAGNOSIS — F43.10 POST TRAUMATIC STRESS DISORDER (PTSD): Chronic | ICD-10-CM

## 2022-04-04 DIAGNOSIS — R45.86 MOOD SWINGS: ICD-10-CM

## 2022-04-04 PROCEDURE — 99214 OFFICE O/P EST MOD 30 MIN: CPT | Performed by: NURSE PRACTITIONER

## 2022-04-04 PROCEDURE — 90833 PSYTX W PT W E/M 30 MIN: CPT | Performed by: NURSE PRACTITIONER

## 2022-04-04 RX ORDER — ARIPIPRAZOLE 2 MG/1
2 TABLET ORAL DAILY
Qty: 21 TABLET | Refills: 0 | Status: SHIPPED | OUTPATIENT
Start: 2022-04-04 | End: 2022-04-20 | Stop reason: SDUPTHER

## 2022-04-04 RX ORDER — VENLAFAXINE HYDROCHLORIDE 150 MG/1
150 CAPSULE, EXTENDED RELEASE ORAL DAILY
Qty: 30 CAPSULE | Refills: 0 | Status: SHIPPED | OUTPATIENT
Start: 2022-04-04 | End: 2022-04-20 | Stop reason: SDUPTHER

## 2022-04-04 RX ORDER — BUSPIRONE HYDROCHLORIDE 10 MG/1
10 TABLET ORAL 2 TIMES DAILY
Qty: 60 TABLET | Refills: 0 | Status: SHIPPED | OUTPATIENT
Start: 2022-04-04 | End: 2022-04-20 | Stop reason: SDUPTHER

## 2022-04-04 RX ORDER — LAMOTRIGINE 100 MG/1
100 TABLET ORAL 2 TIMES DAILY
Qty: 60 TABLET | Refills: 0 | Status: SHIPPED | OUTPATIENT
Start: 2022-04-04 | End: 2022-04-20 | Stop reason: SDUPTHER

## 2022-04-04 NOTE — PROGRESS NOTES
"This provider is located at the [facility] (through [facility]), [address], [facility], [address] using a secure [facility] Video Visit through [employer]. Patient is being seen remotely via telehealth at their home address in [place], and stated they are in a secure environment for this session. The patient's condition being diagnosed/treated is appropriate for telemedicine. The provider identified herself as well as her credentials. The patient, and/or patients guardian, consent to be seen remotely, and when consent is given they understand that the consent allows for patient identifiable information to be sent to a third party as needed. They may refuse to be seen remotely at any time. The electronic data is encrypted and password protected, and the patient and/or guardian has been advised of the potential risks to privacy not withstanding such measures.    You have chosen to receive care through a telehealth visit.  Do you consent to use a video/audio connection for your medical care today? Yes     Subjective   Genesis Chapin is a 26 y.o. female who is here today for medication management follow up.    Chief Complaint: Depression, anxiety, panic attacks, sleeping difficulties    History of Present Illness:  The patient describes her mood as \"more down\" over the last few weeks.  The patient endorses that she has not been feeling as good over the past few weeks.  The patient states that last week she had a significant exacerbation of depression and anxiety that led to suicidal ideations.  The patient states that at the time she had not been able to sleep well in the past 4 days.  The patient states that she had only been sleeping a couple hours \"here and there\" over the past 4 days due to children being sick.  The patient states that her children have been sick over the past 2 weeks, when to significant difficulties with sleeping due to having to awaken throughout the " "night and care for them.  The patient states that she could sleep if she had the opportunity, but states that she just had not been able to due to caring for the children.  The patient states that she was so \"sleep deprived\" causing her to feel so exhausted and tired on top of dealing with life stressors, sick children, so endorses that she \"just wanted it all to end\".  The patient states that she did not actually want to die or harm herself, but states that she was so tired and overwhelmed with everything over the past couple of weeks that she \"just wanted it all to stop\".  The patient states that she called the suicide hotline when these thoughts were occurring and endorses that she felt better after speaking with them.  The patient states that she has not had suicidal ideation since that time and denies any suicidal ideations currently.  The patient states that she has been able to sleep a little bit better since that occurred last week, but states that overall sleep has been very poor over the past couple of weeks due to frequent nighttime awakenings with the children.  The patient states that she has been unable to tolerate the Seroquel.  The patient states that she has trialed both 12.5 mg twice daily and 25 mg nightly and endorses that both doses are too sedating.  The patient states that initially it does not seem to be quite sedating, but states that after continuing the medication for a few days causes her to feel too groggy and oversedated.  The patient states that she really likes the Seroquel and that has been very effective for managing her mood and irritability, but states that she does not think she is able to tolerate the medication due to side effects.  The patient reports her appetite as good.  The patient denies any abnormal muscle movements or tics.  The patient rates her depression at a 4-5/10 on a 0-10 scale, with 10 being the worst.  The patient rates her anxiety at a 10/10 on a 0-10 scale, " with 10 being the worst.  The patient rates hopelessness at a 2-3/10 on a 0-10 scale with 10 being the worst.  The patient denies homicidal ideations, and is convincing.  The patient denies any auditory hallucinations or visual hallucinations.  The patient does not endorse significant symptoms consistent with a psychotic illness.              LMP:  Currently menstrating.  The patient denies any chance of pregnancy at this time.  The patient was educated that her prescribed medications can have potential risk to a developing fetus. The patient is advised to contact this APRN/this office if she becomes pregnant or plans to become pregnant.  Pt verbalizes understanding and acknowledged agreement with this plan in her own words.        Past Psychiatric Medications:  Lexapro 10 mg daily - effective for depression, but ineffective for anxiety   Klonopin 0.5 mg three times daily as needed for anxiety/panic  - effective but discontinued due to marijuana use  Hydroxyzine 25 mg three times daily as needed for anxiety/panic - ineffective  Seroquel - trialed 12.5 mg twice daily then 25 mg nightly.  Effective for irritability/mood swings, but caused sedation at both doses and unable to tolerate.         The following portions of the patient's history were reviewed and updated as appropriate: allergies, current medications, past family history, past medical history, past social history, past surgical history and problem list.    Review of Systems   Constitutional: Positive for activity change and fatigue. Negative for appetite change and unexpected weight change.   Psychiatric/Behavioral: Positive for agitation, dysphoric mood and sleep disturbance. Negative for behavioral problems, confusion, decreased concentration, hallucinations, self-injury and suicidal ideas. The patient is nervous/anxious. The patient is not hyperactive.        Objective   Physical Exam  Constitutional:       Appearance: Normal appearance.   Neurological:       Mental Status: She is alert.   Psychiatric:         Attention and Perception: Attention and perception normal.         Mood and Affect: Affect normal. Mood is anxious and depressed.         Speech: Speech normal.         Behavior: Behavior normal. Behavior is cooperative.         Thought Content: Thought content normal. Thought content does not include homicidal or suicidal ideation. Thought content does not include homicidal or suicidal plan.         Cognition and Memory: Cognition and memory normal.         Judgment: Judgment is impulsive.       not currently breastfeeding.    The patient was seen remotely today via a MyChart Video Visit through [facility].  Unable to obtain vital signs due to nature of remote visit.  Height stated at 67 inches.  Weight stated at 250 pounds.     Allergies   Allergen Reactions   • Amoxicillin Nausea And Vomiting   • Penicillins Nausea And Vomiting       No results found for this or any previous visit (from the past 2016 hour(s)).    Current Medications:   Current Outpatient Medications   Medication Sig Dispense Refill   • busPIRone (BUSPAR) 10 MG tablet Take 1 tablet by mouth 2 (Two) Times a Day. 60 tablet 0   • lamoTRIgine (LaMICtal) 100 MG tablet Take 1 tablet by mouth 2 (Two) Times a Day. 60 tablet 0   • propranolol (INDERAL) 10 MG tablet Take 1 tablet by mouth 2 (Two) Times a Day As Needed (anxiety/panic). 60 tablet 0   • venlafaxine XR (EFFEXOR-XR) 150 MG 24 hr capsule Take 1 capsule by mouth Daily. 30 capsule 0   • ARIPiprazole (ABILIFY) 2 MG tablet Take 1 tablet by mouth Daily. 21 tablet 0     No current facility-administered medications for this visit.       Mental Status Exam:   Hygiene:   good  Cooperation:  Cooperative  Eye Contact:  Good  Psychomotor Behavior:  Appropriate  Affect:  Full range  Hopelessness: Denies  Speech:  Normal  Thought Process:  Goal directed  Thought Content:  Normal  Suicidal:  None  Homicidal:  None  Hallucinations:  None  Delusion:   None  Memory:  Intact  Orientation:  Person, Place, Time and Situation  Reliability:  good  Insight:  Fair  Judgement:  Fair  Impulse Control:  Fair  Physical/Medical Issues:  Yes See medical history        Assessment/Plan   Diagnoses and all orders for this visit:    1. Unspecified mood (affective) disorder (HCC) (Primary)  -     busPIRone (BUSPAR) 10 MG tablet; Take 1 tablet by mouth 2 (Two) Times a Day.  Dispense: 60 tablet; Refill: 0  -     lamoTRIgine (LaMICtal) 100 MG tablet; Take 1 tablet by mouth 2 (Two) Times a Day.  Dispense: 60 tablet; Refill: 0  -     venlafaxine XR (EFFEXOR-XR) 150 MG 24 hr capsule; Take 1 capsule by mouth Daily.  Dispense: 30 capsule; Refill: 0  -     ARIPiprazole (ABILIFY) 2 MG tablet; Take 1 tablet by mouth Daily.  Dispense: 21 tablet; Refill: 0    2. Anxiety disorder, unspecified type  -     busPIRone (BUSPAR) 10 MG tablet; Take 1 tablet by mouth 2 (Two) Times a Day.  Dispense: 60 tablet; Refill: 0  -     lamoTRIgine (LaMICtal) 100 MG tablet; Take 1 tablet by mouth 2 (Two) Times a Day.  Dispense: 60 tablet; Refill: 0  -     venlafaxine XR (EFFEXOR-XR) 150 MG 24 hr capsule; Take 1 capsule by mouth Daily.  Dispense: 30 capsule; Refill: 0  -     ARIPiprazole (ABILIFY) 2 MG tablet; Take 1 tablet by mouth Daily.  Dispense: 21 tablet; Refill: 0    3. Panic disorder (episodic paroxysmal anxiety)  -     busPIRone (BUSPAR) 10 MG tablet; Take 1 tablet by mouth 2 (Two) Times a Day.  Dispense: 60 tablet; Refill: 0  -     lamoTRIgine (LaMICtal) 100 MG tablet; Take 1 tablet by mouth 2 (Two) Times a Day.  Dispense: 60 tablet; Refill: 0  -     venlafaxine XR (EFFEXOR-XR) 150 MG 24 hr capsule; Take 1 capsule by mouth Daily.  Dispense: 30 capsule; Refill: 0    4. Post traumatic stress disorder (PTSD)  -     busPIRone (BUSPAR) 10 MG tablet; Take 1 tablet by mouth 2 (Two) Times a Day.  Dispense: 60 tablet; Refill: 0  -     lamoTRIgine (LaMICtal) 100 MG tablet; Take 1 tablet by mouth 2 (Two) Times a  Day.  Dispense: 60 tablet; Refill: 0  -     venlafaxine XR (EFFEXOR-XR) 150 MG 24 hr capsule; Take 1 capsule by mouth Daily.  Dispense: 30 capsule; Refill: 0    5. Sleeping difficulties    6. Mood swings  -     lamoTRIgine (LaMICtal) 100 MG tablet; Take 1 tablet by mouth 2 (Two) Times a Day.  Dispense: 60 tablet; Refill: 0  -     ARIPiprazole (ABILIFY) 2 MG tablet; Take 1 tablet by mouth Daily.  Dispense: 21 tablet; Refill: 0    7. Irritability  -     lamoTRIgine (LaMICtal) 100 MG tablet; Take 1 tablet by mouth 2 (Two) Times a Day.  Dispense: 60 tablet; Refill: 0  -     ARIPiprazole (ABILIFY) 2 MG tablet; Take 1 tablet by mouth Daily.  Dispense: 21 tablet; Refill: 0          In/Start Time: 9:39 AM EST.  Out/Stop Time: 9:55 AM EST.  16 minutes of face to face direct patient care with patient was spent for supportive psychotherapy including promoting the therapeutic alliance, strengthening self awareness and insights, strengthening coping skills, discussing relaxation techniques, counseling the patient regarding diagnoses, utilizing cognitive behavioral therapy to assist the patient in recognizing more appropriate coping mechanisms which are proven effective in reducing the severity of frequency of symptoms and and in coordination of care.  This APRN assisted the patient in processing the patient's diagnoses including anxiety and depression, and also acknowledged and normalized the patient's thoughts, feelings, and concerns  The patient is also strongly urged to eat healthy well balanced foods in order to reduce further health risks, and exercise as tolerated and in guidance with the patient's PCP's recommendations. This APRN allowed the patient to freely discuss issues without interruption or judgment.  This APRN answered any questions the patient had regarding medication and treatment plan.        Visit Diagnoses:    ICD-10-CM ICD-9-CM   1. Unspecified mood (affective) disorder ([facility])  F39 296.90   2. Anxiety  disorder, unspecified type  F41.9 300.00   3. Panic disorder (episodic paroxysmal anxiety)  F41.0 300.01   4. Post traumatic stress disorder (PTSD)  F43.10 309.81   5. Sleeping difficulties  G47.9 780.50   6. Mood swings  R45.86 799.24   7. Irritability  R45.4 799.22       GOALS:  Short Term Goals: Patient will be compliant with medication, and patient will have no significant medication related side effects.  Patient will be engaged in psychotherapy as indicated.  Patient will report subjective improvement of symptoms.  Long term goals: To stabilize mood and treat/improve subjective symptoms, the patient will stay out of the hospital, the patient will be at an optimal level of functioning, and the patient will take all medications as prescribed.  The patient verbalized understanding and agreement with goals that were mutually set.      SUICIDE RISK ASSESSMENT: Unalterable demographics and a history of mental health intervention indicate this patient is in a high risk category compared to the general population. At present, the patient denies active SI/HI, intentions, or plans at this time and agrees to seek immediate care should such thoughts develop. The patient verbalizes understanding of how to access emergency care if needed and agrees to do so. Consideration of suicide risk and protective factors such as history, current presentation, individual strengths and weaknesses, psychosocial and environmental stressors and variables, psychiatric illness and symptoms, medical conditions and pain, took place in this interview. Based on those considerations, the patient is determined: within individual baseline and presenting no imminent risk for suicide or homicide. Other recommendations: The patient does not meet the criteria for inpatient admission and is not a safety risk to self or others at today's visit. Inpatient treatment offers no significant advantages over outpatient treatment for this patient at today's  visit.      SAFETY PLAN:  Patient was given ample time for questions and fully participated in treatment planning.  Patient was encouraged to call the clinic with any questions or concerns.  Patient was informed of access to emergency care. If patient were to develop any significant symptomatology, suicidal ideation, homicidal ideation, any concerns, or feel unsafe at any time they are to call the clinic and if unable to get immediate assistance should immediately call 911 or go to the nearest emergency room.  The patient is advised to remove or secure (lock away) all lethal weapons (including guns) and sharps (including razors, scissors, knives, etc.).  All medications (including any prescribed and any over the counter medications) should be stored in a safe and secured location that is not obtainable by children/adolescents.  Patient was given an opportunity and encouraged to ask questions about their medication, illness, and treatment. Patient contracted verbally for the following: If you are experiencing an emotional crisis or have thoughts of harming yourself or others, please go to your nearest local emergency room or call 911. Will continue to re-assess medication response and side effects frequently to establish efficacy and ensure safety. Risks, any black box warnings, side effects, off label usage, and benefits of medication and treatment discussed with patient, along with potential adverse side effects of current and/or newly prescribed medication, alternative treatment options, and OTC medications.  Patient verbalized understanding of potential risks, any off label use of medication, any black box warnings, and any side effects in their own words. The patient verbalized understanding and agreed to comply with the safety plan discussed in their own words.  Patient given the number to the office. Number also available to the 24- hour suicide hotline.      TREATMENT PLAN/GOALS: Continue medications and  treatment plan as indicated. Treatment and medication options discussed during today's visit. Patient acknowledged and verbally consented to continue with current treatment plan and was educated on the importance of compliance with treatment and follow-up appointments.        -Discontinue Seroquel 25 mg nightly due to side effects   -Begin Abilify 2 mg daily for mood stabilization   -Continue Lamictal 100 mg twice daily for mood stabilization  -Continue Buspar 10 mg twice daily as adjunct for mood/anxiety   -Continue Effexor  mg daily for mood and anxiety  -Continue propanolol 10 mg twice daily as needed for anxiety/panic  -Continue psychotherapy with Kayla Frank LCSW  -Rule out bipolar/mood disorder        MEDICATION ISSUES: Discussed medication options and treatment plan of prescribed medication, any off label use of medication, as well as the risks, benefits, any black box warnings including increased suicidality, and side effects including but not limited to potential falls, dizziness, possible impaired driving, GI side effects (change in appetite, abdominal discomfort, nausea, vomiting, diarrhea, and/or constipation), dry mouth, somnolence, sedation, insomnia, activation, agitation, irritation, tremors, abnormal muscle movements or disorders, tardive dyskinesia, akathisia, asthenia, headache, sweating, possible bruising or rare bleeding, electrolyte and/or fluid abnormalities, change in blood pressure/heart rate/and or heart rhythm, hypotension, sexual dysfunction, rare impulse control problems, rare seizures, rare neuroleptic malignant syndrome, increased risk of death and cerebrovascular events, change in blood glucose and increased risk for diabetes, change in triglycerides and cholesterol and increased risk for dyslipidemia,  weight gain, weight gain that can become problematic to health, skin conditions and reactions, and metabolic adversities among others. Patient and/or guardian are agreeable to  call the office with any worsening of symptoms or onset of side effects, or if any concerns or questions arise.  The contact information for the office is made available to the patient and/or guardian. Patient and/or guardian are agreeable to call 911 or go to the nearest ER should they begin having any SI/HI, or if any urgent concerns arise. No medication side effects or related complaints today.    This APRN has discussed with the patient/guardian about the possibility of serotonin syndrome when certain medications are taken together, as is the case with this patient.  This APRN has provided the patient/guardian with a list of symptoms of serotonin syndrome including symptoms of autonomic instability, altered sensorium, confusion, restlessness, agitation, myoclonus, hyperreflexia, hyperthermia, diaphoresis, tremor, chills, diarrhea and cramps, ataxia, headache, migraines, seizures, and insomnia; which could lead to permanent hyperthermic brain damage, cardiovascular collapse, coma, or even death.  The patient/guardian are instructed to stop medications immediately and either contact this APRN/this office during regular office hours, or go to the emergency department/call 911, if they begin to experience any of the symptoms discussed.  The benefits and risks of the current medication regimen are discussed with the patient/guardian, and they feel that the benefits out weigh the risks.  The patient/guardian verbalized understanding and agreement in their own words.    This APRN has discussed the benefits and risks of taking Lamictal (Lamotrigine).  The side effects of Lamictal can include a benign rash, blurred or double vision, dizziness, ataxia, sedation, headache, tremor, insomnia, poor coordination, fatigue,  nausea, vomiting, dyspepsia, rhinitis, infection, pharyngitis, asthenia, a rare but serious rash, rare multi-organ failure associated with Torres-Sander Syndrome, toxic epidermal necrolysis, drug  hypersensitivity syndrome, rare blood dyscrasias, rare aseptic meningitis, rare sudden unexplained deaths in people with epilepsy, withdrawal seizures upon abrupt withdrawal, and rare activation of suicidal ideation and behavior (suicidality).  This APRN has discussed that a very slow dose titration when starting, or changing doses, of Lamictal may reduce the incidence of skin rash and other side effects.  The dosage should not be titrated upwards or increased faster than recommended due to the possibility of the discussed side effects and risk of development of a skin rash (which can become life threatening).    This APRN has also discussed that if the patient stops taking the Lamictal for 5 days or longer, it will be necessary to restart the drug with an initial dose titration, as rashes have been reported on reexposure.  If the patient/guardian and Provider decide to stop the Lamictal, the patient/guardian will follow the directions of this APRN/this office as a guided taper over about two weeks is appropriate due to the risk of relapse in bipolar disorder with those with bipolar disorder, the risk of seizures in those with epilepsy, and discontinuation symptoms upon rapid discontinuation of Lamictal.    The patient/guardian verbalizes understanding of benefits and risks as discussed, the patient/guardian feels the benefits outweigh the risks and is agreeable to continue/take Lamictal as discussed.  The patient/guardian is advised should any side effects or rash develops they are to stop the Lamictal immediately and contact this APRN/this office or go to the emergency department immediately.  The patient/guardian verbalizes understanding and agreement with treatment plan in their own words.                        [facility] No Show Policy:  We understand unexpected circumstances arise; however, anytime you miss your appointment we are unable to provide you appropriate care.  In addition,  each appointment missed could have been used to provide care for others.  We ask that you call at least 24 hours in advance to cancel or reschedule an appointment.  We would like to take this opportunity to remind you of our policy stating patients who miss THREE or more appointments without cancelling or rescheduling 24 hours in advance of the appointment may be subject to cancellation of any further visits with our clinic and recommendation to seek in-person services/visits.    Please call [phone] to reschedule your appointment. If there are reasons that make it difficult for you to keep the appointments, please call and let us know how we can help.  Please understand that medication prescribing will not continue without seeing your provider.      [facility]'s No Show Policy reviewed with patient at today's visit. Patient verbalized understanding of this policy. Discussed with patient that in the event that there are three or more no show visits, it will be recommended that they pursue in-person services/visits as noncompliance with telehealth visits indicates that patient is not an appropriate candidate for telemedicine and would likely be more appropriate for in-person services/visits. Patient verbalizes understanding and is agreeable to this.              MEDS ORDERED DURING VISIT:  New Medications Ordered This Visit   Medications   • busPIRone (BUSPAR) 10 MG tablet     Sig: Take 1 tablet by mouth 2 (Two) Times a Day.     Dispense:  60 tablet     Refill:  0   • lamoTRIgine (LaMICtal) 100 MG tablet     Sig: Take 1 tablet by mouth 2 (Two) Times a Day.     Dispense:  60 tablet     Refill:  0   • venlafaxine XR (EFFEXOR-XR) 150 MG 24 hr capsule     Sig: Take 1 capsule by mouth Daily.     Dispense:  30 capsule     Refill:  0   • ARIPiprazole (ABILIFY) 2 MG tablet     Sig: Take 1 tablet by mouth Daily.     Dispense:  21 tablet     Refill:  0       Return in about 3 weeks (around  4/25/2022), or if symptoms worsen or fail to improve, for Recheck.        Patient will follow-up in 3 weeks, highly encouraged the patient if she had any questions or concerns to contact the behavioral health [facility] clinic for sooner appointment patient verbalized understanding.      Functional Status: Moderate impairment     Prognosis: Fair with Ongoing Treatment             This document has been electronically signed by LENA Barajas  April 4, 2022 10:03 EDT       Some of the data in this electronic note has been brought forward from a previous encounter, any necessary changes have been made, it has been reviewed by this APRN, and it is accurate.      Part of this note may be an electronic transcription/translation of spoken language to printed text using the Dragon Dictation System.

## 2022-04-06 ENCOUNTER — TELEMEDICINE (OUTPATIENT)
Dept: PSYCHIATRY | Facility: CLINIC | Age: 27
End: 2022-04-06

## 2022-04-06 DIAGNOSIS — F43.10 POST TRAUMATIC STRESS DISORDER (PTSD): Primary | ICD-10-CM

## 2022-04-06 PROCEDURE — 90832 PSYTX W PT 30 MINUTES: CPT | Performed by: SOCIAL WORKER

## 2022-04-06 NOTE — PROGRESS NOTES
[facility]   Follow-up Progress Note     Date: April 18, 2022  Time In: 11:33AM  Time Out: 12:02 PM      PROGRESS NOTE  Data:  Genesis Chapin is a 26 y.o. female presenting to [facility] for follow-up with Kayla Frank LCSW. The patient is seen remotely using [facility] My Chart. Patient is being seen via telehealth and stated they are in a secure environment for this session.  Patient is located in her home.  The patient's condition being diagnosed/treated is appropriate for telemedicine. The provider identified herself as well as her credentials. The patient and/or patients guardian consent to be seen remotely, and when consent is given they understand that the consent allows for patient identifiable information to be sent to a third party as needed. They may refuse to be seen remotely at any time. The electronic data is encrypted and password protected, and the patient has been advised of the potential risks to privacy not withstanding such measures.    Today Patient presented for follow-up appointment.  She completed PCL 5 with a score of 41.  Patient stated she did 3 challenging beliefs worksheets on trust, safety and power/control.  Patient reviewed 1 of these together.  Patient and provider explored esteem module and discussed how esteem can be impacted by traumas.  Patient and provider reviewed next homework assignment.  Clinical Maneuvering/Intervention: Cognitive processing therapy    Assisted Patient in processing above session content; acknowledged and normalized patient’s thoughts, feelings, and concerns.  Rationalized patient thought process regarding progress with homework.  Discussed triggers associated with patient's PTSD.  Also discussed coping skills for patient to implement such as cognitive reprocessing.    Allowed Patient to freely discuss issues  without interruption or judgement with unconditional positive  regard, active listening skills, and empathy. Therapist provided a safe, confidential environment to facilitate the development of a positive therapeutic relationship and encouraged open, honest communication. Assisted Patient in identifying risk factors which would indicate the need for higher level of care including thoughts to harm self or others and/or self-harming behavior and encouraged Patient to contact this office, call 911, or present to the nearest emergency room should any of these events occur. Discussed crisis intervention services and means to access. Patient adamantly and convincingly denies current suicidal or homicidal ideation or perceptual disturbance. Assisted Patient in processing session content; acknowledged and normalized Patient’s thoughts, feelings, and concerns by utilizing a person-centered approach in efforts to build appropriate rapport and a positive therapeutic relationship with open and honest communication. Therapist utilized dialectical behavior techniques to teach and model emotional regulation and relaxation methods. Therapist assisted Patient with identifying and implementing healthier coping strategies.     Assessment   Patient appears to be maintaining relative stability as compared to baseline.  Patient continues to struggle with PTSD.   As a result, they can be reasonably expected to continue to benefit from treatment and would likely be at increased risk for decompensation otherwise.    Mental Status Exam:   Hygiene:   good  Cooperation:  Cooperative  Eye Contact:  Good  Psychomotor Behavior:  Appropriate  Affect:  Appropriate  Mood: normal  Speech:  Normal  Thought Process:  Goal directed  Thought Content:  Normal  Suicidal:  None  Homicidal:  None  Hallucinations:  None  Delusion:  None  Memory:  Intact  Orientation:  Person, Place, Time and Situation  Reliability:  good  Insight:  Good  Judgement:  Fair  Impulse Control:  Fair  Physical/Medical Issues:  No       PHQ-Score Total:  PHQ-9 Total Score:      JUANCARLOS-7:       Patient's Support Network Includes:      Functional Status: Moderate impairment     Progress toward goal: Not at goal    Prognosis: Good with Ongoing Treatment            Impression/Formulation:    VISIT DIAGNOSIS:     ICD-10-CM ICD-9-CM   1. Post traumatic stress disorder (PTSD)  F43.10 309.81        Patient appeared alert and oriented.  Patient is voluntarily requesting to continue outpatient therapy at [facility].  Patient is receptive to assistance with maintaining a stable lifestyle.  Patient presents with history of PTSD.  Patient is agreeable to attend routine therapy sessions.  Patient expressed desire to maintain stability and participate in the therapeutic process.        Crisis Plan:  If symptoms/behaviors persist, Patient will present to the nearest hospital for an assessment. Advised patient of [facility] ER and assessment services.     Plan:   Patient will continue in individual outpatient therapy with focus on improved functioning and coping skills, maintaining stability, and avoiding decompensation and the need for higher level of care.    Patient will contact this office, call 911 or present to the nearest emergency room should suicidal or homicidal ideations occur. Provide Cognitive Behavioral Therapy and Solution Focused Therapy to improve functioning, maintain stability, and avoid decompensation and the need for higher level of care.     Return in about 1 weeks, or earlier if symptoms worsen or fail to improve.    Recommended Referrals: None        This document has been electronically signed by Kayla Frank LCSW  April 18, 2022 07:28 EDT        Part of this note may be an electronic transcription/translation of spoken language to printed text using the Dragon Dictation System.

## 2022-04-11 ENCOUNTER — TELEMEDICINE (OUTPATIENT)
Dept: PSYCHIATRY | Facility: CLINIC | Age: 27
End: 2022-04-11

## 2022-04-11 DIAGNOSIS — F43.10 POST TRAUMATIC STRESS DISORDER (PTSD): Primary | ICD-10-CM

## 2022-04-11 PROCEDURE — 90832 PSYTX W PT 30 MINUTES: CPT | Performed by: SOCIAL WORKER

## 2022-04-11 NOTE — PROGRESS NOTES
[facility]   Follow-up Progress Note     Date: April 12, 2022  Time In: 3:24PM  Time Out: 4:03PM      PROGRESS NOTE  Data:  Genesis Chapin is a 26 y.o. female presenting to [facility] for follow-up with Kayla Frank LCSW. The patient is seen remotely using [facility] My Chart. Patient is being seen via telehealth and stated they are in a secure environment for this session.  Patient is located in her home.  The patient's condition being diagnosed/treated is appropriate for telemedicine. The provider identified herself as well as her credentials. The patient and/or patients guardian consent to be seen remotely, and when consent is given they understand that the consent allows for patient identifiable information to be sent to a third party as needed. They may refuse to be seen remotely at any time. The electronic data is encrypted and password protected, and the patient has been advised of the potential risks to privacy not withstanding such measures.    Today Patient presented for follow-up appointment.  Patient completed PCL 5 with a score of 34.  Patient explored drop of scores by a few points and stated she noticed her doing nice things for herself and excepting and receiving complements impacted this.  Patient reviewed challenging beliefs worksheet on esteem.  Patient and provider reviewed intimacy module.  Patient and provider discussed the need to continue to challenge more stuck points as she still has many that are unresolved.   Patient and provider will continue identifying stuck points.    Clinical Maneuvering/Intervention: CPT    Assisted Patient in processing above session content; acknowledged and normalized patient’s thoughts, feelings, and concerns.  Rationalized patient thought process regarding stuck points.  Discussed triggers associated with patient's PTSD. Also discussed coping skills for patient to implement such  as cognitive reprocessing.    Allowed Patient to freely discuss issues  without interruption or judgement with unconditional positive regard, active listening skills, and empathy. Therapist provided a safe, confidential environment to facilitate the development of a positive therapeutic relationship and encouraged open, honest communication. Assisted Patient in identifying risk factors which would indicate the need for higher level of care including thoughts to harm self or others and/or self-harming behavior and encouraged Patient to contact this office, call 911, or present to the nearest emergency room should any of these events occur. Discussed crisis intervention services and means to access. Patient adamantly and convincingly denies current suicidal or homicidal ideation or perceptual disturbance. Assisted Patient in processing session content; acknowledged and normalized Patient’s thoughts, feelings, and concerns by utilizing a person-centered approach in efforts to build appropriate rapport and a positive therapeutic relationship with open and honest communication. Therapist utilized dialectical behavior techniques to teach and model emotional regulation and relaxation methods. Therapist assisted Patient with identifying and implementing healthier coping strategies.     Assessment   Patient appears to be maintaining relative stability as compared to baseline.  Patient continues to struggle with PTSD.   As a result, they can be reasonably expected to continue to benefit from treatment and would likely be at increased risk for decompensation otherwise.    Mental Status Exam:   Hygiene:   good  Cooperation:  Cooperative  Eye Contact:  Good  Psychomotor Behavior:  Appropriate  Affect:  Appropriate  Mood: normal  Speech:  Normal  Thought Process:  Goal directed  Thought Content:  Normal  Suicidal:  None  Homicidal:  None  Hallucinations:  None  Delusion:  None  Memory:  Intact  Orientation:  Person, Place, Time  and Situation  Reliability:  good  Insight:  Good  Judgement:  Good  Impulse Control:  Fair  Physical/Medical Issues:  No      PHQ-Score Total:  PHQ-9 Total Score:      JUANCARLOS-7:       Patient's Support Network Includes:      Functional Status: Moderate impairment     Progress toward goal: Not at goal    Prognosis: Good with Ongoing Treatment            Impression/Formulation:    VISIT DIAGNOSIS:     ICD-10-CM ICD-9-CM   1. Post traumatic stress disorder (PTSD)  F43.10 309.81        Patient appeared alert and oriented.  Patient is voluntarily requesting to continue outpatient therapy at [facility].  Patient is receptive to assistance with maintaining a stable lifestyle.  Patient presents with history of anxiety, depression, PTSD.  Patient is agreeable to attend routine therapy sessions.  Patient expressed desire to maintain stability and participate in the therapeutic process.        Crisis Plan:  If symptoms/behaviors persist, Patient will present to the nearest hospital for an assessment. Advised patient of [facility] ER and assessment services.     Plan:   Patient will continue in individual outpatient therapy with focus on improved functioning and coping skills, maintaining stability, and avoiding decompensation and the need for higher level of care.    Patient will contact this office, call 911 or present to the nearest emergency room should suicidal or homicidal ideations occur. Provide Cognitive Behavioral Therapy and Solution Focused Therapy to improve functioning, maintain stability, and avoid decompensation and the need for higher level of care.     Return in about 1 weeks, or earlier if symptoms worsen or fail to improve.    Recommended Referrals: none        This document has been electronically signed by Kayla Frank LCSW  April 12, 2022 11:42 EDT        Part of this note may be an electronic transcription/translation of spoken language to printed text  using the Dragon Dictation System.

## 2022-04-18 ENCOUNTER — TELEMEDICINE (OUTPATIENT)
Dept: PSYCHIATRY | Facility: CLINIC | Age: 27
End: 2022-04-18

## 2022-04-18 DIAGNOSIS — F43.10 POST TRAUMATIC STRESS DISORDER (PTSD): Primary | ICD-10-CM

## 2022-04-18 PROCEDURE — 90834 PSYTX W PT 45 MINUTES: CPT | Performed by: SOCIAL WORKER

## 2022-04-18 NOTE — PROGRESS NOTES
[facility]   Follow-up Progress Note     Date: April 19, 2022  Time In: 12:32PM   Time Out: 1:10 PM      PROGRESS NOTE  Data:  Gneesis Chapin is a 26 y.o. female presenting to [facility] for follow-up with Kayla Frank LCSW. The patient is seen remotely using [facility] My Chart. Patient is being seen via telehealth and stated they are in a secure environment for this session.  Patient is located in her home.  The patient's condition being diagnosed/treated is appropriate for telemedicine. The provider identified herself as well as her credentials. The patient and/or patients guardian consent to be seen remotely, and when consent is given they understand that the consent allows for patient identifiable information to be sent to a third party as needed. They may refuse to be seen remotely at any time. The electronic data is encrypted and password protected, and the patient has been advised of the potential risks to privacy not withstanding such measures.    Today Patient presented for follow up appointment. She checked in with how things have been, she stated she has been very stressed out this week because of her upcoming trip.  Patient and provider reviewed stuck points related to this.  Patient identified stuck points about power and control and needing to be in charge of everything leading to worry of what will happen while she is gone.  Patient did not complete any homework this week.  Patient and provider discussed stuck points related to intimacy patient has good insight into her intimacy related stuck points but needs to follow through with challenging stuck points.  Clinical Maneuvering/Intervention: CPT    Assisted Patient in processing above session content; acknowledged and normalized patient’s thoughts, feelings, and concerns.  Rationalized patient thought process regarding intimacy.  Discussed triggers associated with  patient's PTSD.  Also discussed coping skills for patient to implement such as cognitive reprocessing.    Allowed Patient to freely discuss issues  without interruption or judgement with unconditional positive regard, active listening skills, and empathy. Therapist provided a safe, confidential environment to facilitate the development of a positive therapeutic relationship and encouraged open, honest communication. Assisted Patient in identifying risk factors which would indicate the need for higher level of care including thoughts to harm self or others and/or self-harming behavior and encouraged Patient to contact this office, call 911, or present to the nearest emergency room should any of these events occur. Discussed crisis intervention services and means to access. Patient adamantly and convincingly denies current suicidal or homicidal ideation or perceptual disturbance. Assisted Patient in processing session content; acknowledged and normalized Patient’s thoughts, feelings, and concerns by utilizing a person-centered approach in efforts to build appropriate rapport and a positive therapeutic relationship with open and honest communication. Therapist utilized dialectical behavior techniques to teach and model emotional regulation and relaxation methods. Therapist assisted Patient with identifying and implementing healthier coping strategies.     Assessment   Patient appears to be maintaining relative stability as compared to baseline.  Patient continues to struggle with PTSD.   As a result, they can be reasonably expected to continue to benefit from treatment and would likely be at increased risk for decompensation otherwise.    Mental Status Exam:   Hygiene:   good  Cooperation:  Cooperative  Eye Contact:  Good  Psychomotor Behavior:  Appropriate  Affect:  Appropriate  Mood: normal  Speech:  Normal  Thought Process:  Goal directed  Thought Content:  Normal  Suicidal:  None  Homicidal:  None  Hallucinations:   None  Delusion:  None  Memory:  Intact  Orientation:  Person, Place, Time and Situation  Reliability:  good  Insight:  Good  Judgement:  Good  Impulse Control:  Good  Physical/Medical Issues:  No      PHQ-Score Total:  PHQ-9 Total Score:      JUANCARLOS-7:       Patient's Support Network Includes:      Functional Status: Moderate impairment     Progress toward goal: Not at goal    Prognosis: Good with Ongoing Treatment            Impression/Formulation:    VISIT DIAGNOSIS:     ICD-10-CM ICD-9-CM   1. Post traumatic stress disorder (PTSD)  F43.10 309.81        Patient appeared alert and oriented.  [facility] is [facility]  Patient is receptive to assistance with maintaining a stable lifestyle.  Patient presents with history of PTSD.  Patient is agreeable to attend routine therapy sessions.  Patient expressed desire to maintain stability and participate in the therapeutic process.        Crisis Plan:  If symptoms/behaviors persist, Patient will present to the nearest hospital for an assessment. Advised patient of [facility] ER and assessment services.     Plan:   Patient will continue in individual outpatient therapy with focus on improved functioning and coping skills, maintaining stability, and avoiding decompensation and the need for higher level of care.    Patient will contact this office, call 911 or present to the nearest emergency room should suicidal or homicidal ideations occur. Provide Cognitive Behavioral Therapy and Solution Focused Therapy to improve functioning, maintain stability, and avoid decompensation and the need for higher level of care.     Return in about 1 weeks, or earlier if symptoms worsen or fail to improve.    Recommended Referrals: none        This document has been electronically signed by Kayla Frank LCSW  April 19, 2022 08:14 EDT        Part of this note may be an electronic  transcription/translation of spoken language to printed text using the Dragon Dictation System.

## 2022-04-19 NOTE — PROGRESS NOTES
"This provider is located at the [facility] (through [facility]), [address], [facility], [address] using a secure Server[employer] Video Visit through [employer]. Patient is being seen remotely via telehealth at their home address in [place], and stated they are in a secure environment for this session. The patient's condition being diagnosed/treated is appropriate for telemedicine. The provider identified herself as well as her credentials. The patient, and/or patients guardian, consent to be seen remotely, and when consent is given they understand that the consent allows for patient identifiable information to be sent to a third party as needed. They may refuse to be seen remotely at any time. The electronic data is encrypted and password protected, and the patient and/or guardian has been advised of the potential risks to privacy not withstanding such measures.    You have chosen to receive care through a telehealth visit.  Do you consent to use a video/audio connection for your medical care today? Yes     Subjective   Genesis Chapin is a 27 y.o. female who is here today for medication management follow up.    Chief Complaint: Depression, anxiety, panic attacks, mood swings, irritability    History of Present Illness:  The patient describes her mood as \"better\" over the last few weeks.  The patient endorses that she has been feeling a lot better over the past few weeks.  The patient states that she really likes the Abilify and endorses that has been effective for managing her mood/depression.  The patient endorses that mood is been relatively stable recently and denies any significant mood fluctuations.  The patient states that she has continued to have a lot of anxiety, but endorses that she feels that anxiety has also been somewhat improved since beginning the Abilify.  The patient reports her appetite as good.  The patient reports her sleep as good.  The patient denies any " nightmares or bad dreams.  The patient reports compliance with current medication regimen.  The patient denies any current side effects from her current medication regimen.  The patient denies any abnormal muscle movements or tics.  The patient rates her depression at a 2/10 on a 0-10 scale, with 10 being the worst.  The patient rates her anxiety at a 8/10 on a 0-10 scale, with 10 being the worst.  The patient rates hopelessness at a 0/10 on a 0-10 scale with 10 being the worst.  The patient would like to not adjust or change her medications at this visit.  The patient denies suicidal ideations and homicidal ideations, and is convincing.  The patient denies any auditory hallucinations or visual hallucinations.  The patient does not endorse significant symptoms consistent with a psychotic illness.                LMP:  3 weeks ago.  The patient denies any chance of pregnancy at this time.  The patient was educated that her prescribed medications can have potential risk to a developing fetus. The patient is advised to contact this APRN/this office if she becomes pregnant or plans to become pregnant.  Pt verbalizes understanding and acknowledged agreement with this plan in her own words.        Past Psychiatric Medications:  Lexapro 10 mg daily - effective for depression, but ineffective for anxiety   Klonopin 0.5 mg three times daily as needed for anxiety/panic  - effective but discontinued due to marijuana use  Hydroxyzine 25 mg three times daily as needed for anxiety/panic - ineffective  Seroquel - trialed 12.5 mg twice daily then 25 mg nightly.  Effective for irritability/mood swings, but caused sedation at both doses and unable to tolerate.         The following portions of the patient's history were reviewed and updated as appropriate: allergies, current medications, past family history, past medical history, past social history, past surgical history and problem list.    Review of Systems   Constitutional:  Positive for fatigue. Negative for activity change, appetite change and unexpected weight change.   Psychiatric/Behavioral: Positive for agitation and dysphoric mood. Negative for behavioral problems, confusion, decreased concentration, hallucinations, self-injury, sleep disturbance and suicidal ideas. The patient is nervous/anxious. The patient is not hyperactive.        Objective   Physical Exam  Constitutional:       Appearance: Normal appearance.   Neurological:      Mental Status: She is alert.   Psychiatric:         Attention and Perception: Attention and perception normal.         Mood and Affect: Affect normal. Mood is anxious. Mood is not depressed.         Speech: Speech normal.         Behavior: Behavior normal. Behavior is cooperative.         Thought Content: Thought content normal. Thought content does not include homicidal or suicidal ideation. Thought content does not include homicidal or suicidal plan.         Cognition and Memory: Cognition and memory normal.         Judgment: Judgment is impulsive.       not currently breastfeeding.    The patient was seen remotely today via a MyChart Video Visit through [facility].  Unable to obtain vital signs due to nature of remote visit.  Height stated at 67 inches.  Weight stated at 250 pounds.     Allergies   Allergen Reactions   • Amoxicillin Nausea And Vomiting   • Penicillins Nausea And Vomiting       No results found for this or any previous visit (from the past 2016 hour(s)).    Current Medications:   Current Outpatient Medications   Medication Sig Dispense Refill   • ARIPiprazole (ABILIFY) 2 MG tablet Take 1 tablet by mouth Daily. 30 tablet 0   • busPIRone (BUSPAR) 10 MG tablet Take 1 tablet by mouth 2 (Two) Times a Day. 60 tablet 0   • clindamycin (CLEOCIN) 150 MG capsule      • cyclobenzaprine (FLEXERIL) 10 MG tablet      • Junel 1.5/30 1.5-30 MG-MCG tablet      • lamoTRIgine (LaMICtal) 100 MG tablet Take 1 tablet by mouth 2 (Two) Times a Day. 60 tablet 0    • meloxicam (MOBIC) 15 MG tablet      • propranolol (INDERAL) 10 MG tablet Take 1 tablet by mouth 2 (Two) Times a Day As Needed (anxiety/panic). 60 tablet 0   • venlafaxine XR (EFFEXOR-XR) 150 MG 24 hr capsule Take 1 capsule by mouth Daily. 30 capsule 0     No current facility-administered medications for this visit.       Mental Status Exam:   Hygiene:   good  Cooperation:  Cooperative  Eye Contact:  Good  Psychomotor Behavior:  Appropriate  Affect:  Full range  Hopelessness: Denies  Speech:  Normal  Thought Process:  Goal directed  Thought Content:  Normal  Suicidal:  None  Homicidal:  None  Hallucinations:  None  Delusion:  None  Memory:  Intact  Orientation:  Person, Place, Time and Situation  Reliability:  good  Insight:  Fair  Judgement:  Fair  Impulse Control:  Fair  Physical/Medical Issues:  Yes See medical history        PHQ-2 Depression Screening  Little interest or pleasure in doing things? 0-->not at all   Feeling down, depressed, or hopeless? 1-->several days   PHQ-2 Total Score 1       Assessment/Plan   Diagnoses and all orders for this visit:    1. Post traumatic stress disorder (PTSD) (Primary)  -     busPIRone (BUSPAR) 10 MG tablet; Take 1 tablet by mouth 2 (Two) Times a Day.  Dispense: 60 tablet; Refill: 0  -     lamoTRIgine (LaMICtal) 100 MG tablet; Take 1 tablet by mouth 2 (Two) Times a Day.  Dispense: 60 tablet; Refill: 0  -     venlafaxine XR (EFFEXOR-XR) 150 MG 24 hr capsule; Take 1 capsule by mouth Daily.  Dispense: 30 capsule; Refill: 0    2. Unspecified mood (affective) disorder (HCC)  -     ARIPiprazole (ABILIFY) 2 MG tablet; Take 1 tablet by mouth Daily.  Dispense: 30 tablet; Refill: 0  -     busPIRone (BUSPAR) 10 MG tablet; Take 1 tablet by mouth 2 (Two) Times a Day.  Dispense: 60 tablet; Refill: 0  -     lamoTRIgine (LaMICtal) 100 MG tablet; Take 1 tablet by mouth 2 (Two) Times a Day.  Dispense: 60 tablet; Refill: 0  -     venlafaxine XR (EFFEXOR-XR) 150 MG 24 hr capsule; Take 1  capsule by mouth Daily.  Dispense: 30 capsule; Refill: 0    3. Anxiety disorder, unspecified type  -     ARIPiprazole (ABILIFY) 2 MG tablet; Take 1 tablet by mouth Daily.  Dispense: 30 tablet; Refill: 0  -     busPIRone (BUSPAR) 10 MG tablet; Take 1 tablet by mouth 2 (Two) Times a Day.  Dispense: 60 tablet; Refill: 0  -     propranolol (INDERAL) 10 MG tablet; Take 1 tablet by mouth 2 (Two) Times a Day As Needed (anxiety/panic).  Dispense: 60 tablet; Refill: 0  -     lamoTRIgine (LaMICtal) 100 MG tablet; Take 1 tablet by mouth 2 (Two) Times a Day.  Dispense: 60 tablet; Refill: 0  -     venlafaxine XR (EFFEXOR-XR) 150 MG 24 hr capsule; Take 1 capsule by mouth Daily.  Dispense: 30 capsule; Refill: 0    4. Panic disorder (episodic paroxysmal anxiety)  -     busPIRone (BUSPAR) 10 MG tablet; Take 1 tablet by mouth 2 (Two) Times a Day.  Dispense: 60 tablet; Refill: 0  -     propranolol (INDERAL) 10 MG tablet; Take 1 tablet by mouth 2 (Two) Times a Day As Needed (anxiety/panic).  Dispense: 60 tablet; Refill: 0  -     lamoTRIgine (LaMICtal) 100 MG tablet; Take 1 tablet by mouth 2 (Two) Times a Day.  Dispense: 60 tablet; Refill: 0  -     venlafaxine XR (EFFEXOR-XR) 150 MG 24 hr capsule; Take 1 capsule by mouth Daily.  Dispense: 30 capsule; Refill: 0    5. Mood swings  -     ARIPiprazole (ABILIFY) 2 MG tablet; Take 1 tablet by mouth Daily.  Dispense: 30 tablet; Refill: 0  -     lamoTRIgine (LaMICtal) 100 MG tablet; Take 1 tablet by mouth 2 (Two) Times a Day.  Dispense: 60 tablet; Refill: 0    6. Irritability  -     ARIPiprazole (ABILIFY) 2 MG tablet; Take 1 tablet by mouth Daily.  Dispense: 30 tablet; Refill: 0  -     lamoTRIgine (LaMICtal) 100 MG tablet; Take 1 tablet by mouth 2 (Two) Times a Day.  Dispense: 60 tablet; Refill: 0              Visit Diagnoses:    ICD-10-CM ICD-9-CM   1. Post traumatic stress disorder (PTSD)  F43.10 309.81   2. Unspecified mood (affective) disorder (HCC)  F39 296.90   3. Anxiety disorder,  unspecified type  F41.9 300.00   4. Panic disorder (episodic paroxysmal anxiety)  F41.0 300.01   5. Mood swings  R45.86 799.24   6. Irritability  R45.4 799.22       GOALS:  Short Term Goals: Patient will be compliant with medication, and patient will have no significant medication related side effects.  Patient will be engaged in psychotherapy as indicated.  Patient will report subjective improvement of symptoms.  Long term goals: To stabilize mood and treat/improve subjective symptoms, the patient will stay out of the hospital, the patient will be at an optimal level of functioning, and the patient will take all medications as prescribed.  The patient verbalized understanding and agreement with goals that were mutually set.      SUICIDE RISK ASSESSMENT: Unalterable demographics and a history of mental health intervention indicate this patient is in a high risk category compared to the general population. At present, the patient denies active SI/HI, intentions, or plans at this time and agrees to seek immediate care should such thoughts develop. The patient verbalizes understanding of how to access emergency care if needed and agrees to do so. Consideration of suicide risk and protective factors such as history, current presentation, individual strengths and weaknesses, psychosocial and environmental stressors and variables, psychiatric illness and symptoms, medical conditions and pain, took place in this interview. Based on those considerations, the patient is determined: within individual baseline and presenting no imminent risk for suicide or homicide. Other recommendations: The patient does not meet the criteria for inpatient admission and is not a safety risk to self or others at today's visit. Inpatient treatment offers no significant advantages over outpatient treatment for this patient at today's visit.      SAFETY PLAN:  Patient was given ample time for questions and fully participated in treatment  planning.  Patient was encouraged to call the clinic with any questions or concerns.  Patient was informed of access to emergency care. If patient were to develop any significant symptomatology, suicidal ideation, homicidal ideation, any concerns, or feel unsafe at any time they are to call the clinic and if unable to get immediate assistance should immediately call 911 or go to the nearest emergency room.  The patient is advised to remove or secure (lock away) all lethal weapons (including guns) and sharps (including razors, scissors, knives, etc.).  All medications (including any prescribed and any over the counter medications) should be stored in a safe and secured location that is not obtainable by children/adolescents.  Patient was given an opportunity and encouraged to ask questions about their medication, illness, and treatment. Patient contracted verbally for the following: If you are experiencing an emotional crisis or have thoughts of harming yourself or others, please go to your nearest local emergency room or call 911. Will continue to re-assess medication response and side effects frequently to establish efficacy and ensure safety. Risks, any black box warnings, side effects, off label usage, and benefits of medication and treatment discussed with patient, along with potential adverse side effects of current and/or newly prescribed medication, alternative treatment options, and OTC medications.  Patient verbalized understanding of potential risks, any off label use of medication, any black box warnings, and any side effects in their own words. The patient verbalized understanding and agreed to comply with the safety plan discussed in their own words.  Patient given the number to the office. Number also available to the 24- hour suicide hotline.      TREATMENT PLAN/GOALS: Continue medications and treatment plan as indicated. Treatment and medication options discussed during today's visit. Patient  acknowledged and verbally consented to continue with current treatment plan and was educated on the importance of compliance with treatment and follow-up appointments.        -Continue Abilify 2 mg daily for mood stabilization   -Continue Lamictal 100 mg twice daily for mood stabilization  -Continue Buspar 10 mg twice daily as adjunct for mood/anxiety   -Continue Effexor  mg daily for mood and anxiety  -Continue Propanolol 10 mg twice daily as needed for anxiety/panic  -Continue psychotherapy with Kayla Frank LCSW  -Rule out bipolar/mood disorder        MEDICATION ISSUES: Discussed medication options and treatment plan of prescribed medication, any off label use of medication, as well as the risks, benefits, any black box warnings including increased suicidality, and side effects including but not limited to potential falls, dizziness, possible impaired driving, GI side effects (change in appetite, abdominal discomfort, nausea, vomiting, diarrhea, and/or constipation), dry mouth, somnolence, sedation, insomnia, activation, agitation, irritation, tremors, abnormal muscle movements or disorders, tardive dyskinesia, akathisia, asthenia, headache, sweating, possible bruising or rare bleeding, electrolyte and/or fluid abnormalities, change in blood pressure/heart rate/and or heart rhythm, hypotension, sexual dysfunction, rare impulse control problems, rare seizures, rare neuroleptic malignant syndrome, increased risk of death and cerebrovascular events, change in blood glucose and increased risk for diabetes, change in triglycerides and cholesterol and increased risk for dyslipidemia,  weight gain, weight gain that can become problematic to health, skin conditions and reactions, and metabolic adversities among others. Patient and/or guardian are agreeable to call the office with any worsening of symptoms or onset of side effects, or if any concerns or questions arise.  The contact information for the office is  made available to the patient and/or guardian. Patient and/or guardian are agreeable to call 911 or go to the nearest ER should they begin having any SI/HI, or if any urgent concerns arise. No medication side effects or related complaints today.    This APRN has discussed with the patient/guardian about the possibility of serotonin syndrome when certain medications are taken together, as is the case with this patient.  This APRN has provided the patient/guardian with a list of symptoms of serotonin syndrome including symptoms of autonomic instability, altered sensorium, confusion, restlessness, agitation, myoclonus, hyperreflexia, hyperthermia, diaphoresis, tremor, chills, diarrhea and cramps, ataxia, headache, migraines, seizures, and insomnia; which could lead to permanent hyperthermic brain damage, cardiovascular collapse, coma, or even death.  The patient/guardian are instructed to stop medications immediately and either contact this APRN/this office during regular office hours, or go to the emergency department/call 911, if they begin to experience any of the symptoms discussed.  The benefits and risks of the current medication regimen are discussed with the patient/guardian, and they feel that the benefits out weigh the risks.  The patient/guardian verbalized understanding and agreement in their own words.    This APRN has discussed the benefits and risks of taking Lamictal (Lamotrigine).  The side effects of Lamictal can include a benign rash, blurred or double vision, dizziness, ataxia, sedation, headache, tremor, insomnia, poor coordination, fatigue,  nausea, vomiting, dyspepsia, rhinitis, infection, pharyngitis, asthenia, a rare but serious rash, rare multi-organ failure associated with Torres-Sander Syndrome, toxic epidermal necrolysis, drug hypersensitivity syndrome, rare blood dyscrasias, rare aseptic meningitis, rare sudden unexplained deaths in people with epilepsy, withdrawal seizures upon abrupt  withdrawal, and rare activation of suicidal ideation and behavior (suicidality).  This APRN has discussed that a very slow dose titration when starting, or changing doses, of Lamictal may reduce the incidence of skin rash and other side effects.  The dosage should not be titrated upwards or increased faster than recommended due to the possibility of the discussed side effects and risk of development of a skin rash (which can become life threatening).    This APRN has also discussed that if the patient stops taking the Lamictal for 5 days or longer, it will be necessary to restart the drug with an initial dose titration, as rashes have been reported on reexposure.  If the patient/guardian and Provider decide to stop the Lamictal, the patient/guardian will follow the directions of this APRN/this office as a guided taper over about two weeks is appropriate due to the risk of relapse in bipolar disorder with those with bipolar disorder, the risk of seizures in those with epilepsy, and discontinuation symptoms upon rapid discontinuation of Lamictal.    The patient/guardian verbalizes understanding of benefits and risks as discussed, the patient/guardian feels the benefits outweigh the risks and is agreeable to continue/take Lamictal as discussed.  The patient/guardian is advised should any side effects or rash develops they are to stop the Lamictal immediately and contact this APRN/this office or go to the emergency department immediately.  The patient/guardian verbalizes understanding and agreement with treatment plan in their own words.                        [facility] No Show Policy:  We understand unexpected circumstances arise; however, anytime you miss your appointment we are unable to provide you appropriate care.  In addition, each appointment missed could have been used to provide care for others.  We ask that you call at least 24 hours in advance to cancel or reschedule an appointment.   We would like to take this opportunity to remind you of our policy stating patients who miss THREE or more appointments without cancelling or rescheduling 24 hours in advance of the appointment may be subject to cancellation of any further visits with our clinic and recommendation to seek in-person services/visits.    Please call [phone] to reschedule your appointment. If there are reasons that make it difficult for you to keep the appointments, please call and let us know how we can help.  Please understand that medication prescribing will not continue without seeing your provider.      [facility]'s No Show Policy reviewed with patient at today's visit. Patient verbalized understanding of this policy. Discussed with patient that in the event that there are three or more no show visits, it will be recommended that they pursue in-person services/visits as noncompliance with telehealth visits indicates that patient is not an appropriate candidate for telemedicine and would likely be more appropriate for in-person services/visits. Patient verbalizes understanding and is agreeable to this.              MEDS ORDERED DURING VISIT:  New Medications Ordered This Visit   Medications   • ARIPiprazole (ABILIFY) 2 MG tablet     Sig: Take 1 tablet by mouth Daily.     Dispense:  30 tablet     Refill:  0   • busPIRone (BUSPAR) 10 MG tablet     Sig: Take 1 tablet by mouth 2 (Two) Times a Day.     Dispense:  60 tablet     Refill:  0   • propranolol (INDERAL) 10 MG tablet     Sig: Take 1 tablet by mouth 2 (Two) Times a Day As Needed (anxiety/panic).     Dispense:  60 tablet     Refill:  0   • lamoTRIgine (LaMICtal) 100 MG tablet     Sig: Take 1 tablet by mouth 2 (Two) Times a Day.     Dispense:  60 tablet     Refill:  0   • venlafaxine XR (EFFEXOR-XR) 150 MG 24 hr capsule     Sig: Take 1 capsule by mouth Daily.     Dispense:  30 capsule     Refill:  0       Return in about 4 weeks (around 5/18/2022), or  if symptoms worsen or fail to improve, for Recheck.        Patient will follow-up in 4 weeks, highly encouraged the patient if she had any questions or concerns to contact the behavioral health [facility] clinic for sooner appointment patient verbalized understanding.      Functional Status: Moderate impairment     Prognosis: Fair with Ongoing Treatment             This document has been electronically signed by LENA Barajas  April 20, 2022 13:55 EDT       Some of the data in this electronic note has been brought forward from a previous encounter, any necessary changes have been made, it has been reviewed by this APRN, and it is accurate.      Part of this note may be an electronic transcription/translation of spoken language to printed text using the Dragon Dictation System.

## 2022-04-20 ENCOUNTER — TELEMEDICINE (OUTPATIENT)
Dept: PSYCHIATRY | Facility: CLINIC | Age: 27
End: 2022-04-20

## 2022-04-20 DIAGNOSIS — R45.86 MOOD SWINGS: ICD-10-CM

## 2022-04-20 DIAGNOSIS — R45.4 IRRITABILITY: ICD-10-CM

## 2022-04-20 DIAGNOSIS — F43.10 POST TRAUMATIC STRESS DISORDER (PTSD): Primary | Chronic | ICD-10-CM

## 2022-04-20 DIAGNOSIS — F41.9 ANXIETY DISORDER, UNSPECIFIED TYPE: Chronic | ICD-10-CM

## 2022-04-20 DIAGNOSIS — F41.0 PANIC DISORDER (EPISODIC PAROXYSMAL ANXIETY): ICD-10-CM

## 2022-04-20 DIAGNOSIS — F39 UNSPECIFIED MOOD (AFFECTIVE) DISORDER: Chronic | ICD-10-CM

## 2022-04-20 PROCEDURE — 99214 OFFICE O/P EST MOD 30 MIN: CPT | Performed by: NURSE PRACTITIONER

## 2022-04-20 RX ORDER — LAMOTRIGINE 100 MG/1
100 TABLET ORAL 2 TIMES DAILY
Qty: 60 TABLET | Refills: 0 | Status: SHIPPED | OUTPATIENT
Start: 2022-04-20 | End: 2022-05-27 | Stop reason: SDUPTHER

## 2022-04-20 RX ORDER — MELOXICAM 15 MG/1
TABLET ORAL
COMMUNITY
Start: 2022-04-15 | End: 2022-06-16

## 2022-04-20 RX ORDER — CYCLOBENZAPRINE HCL 10 MG
TABLET ORAL
COMMUNITY
Start: 2022-04-15 | End: 2022-08-16

## 2022-04-20 RX ORDER — ARIPIPRAZOLE 2 MG/1
2 TABLET ORAL DAILY
Qty: 30 TABLET | Refills: 0 | Status: SHIPPED | OUTPATIENT
Start: 2022-04-20 | End: 2022-05-27 | Stop reason: SDUPTHER

## 2022-04-20 RX ORDER — CLINDAMYCIN HYDROCHLORIDE 150 MG/1
CAPSULE ORAL
COMMUNITY
Start: 2022-04-19 | End: 2022-08-16

## 2022-04-20 RX ORDER — NORETHINDRONE ACETATE AND ETHINYL ESTRADIOL 1.5; 3 MG/1; UG/1
TABLET ORAL
COMMUNITY
Start: 2022-04-15

## 2022-04-20 RX ORDER — PROPRANOLOL HYDROCHLORIDE 10 MG/1
10 TABLET ORAL 2 TIMES DAILY PRN
Qty: 60 TABLET | Refills: 0 | Status: SHIPPED | OUTPATIENT
Start: 2022-04-20 | End: 2022-08-16

## 2022-04-20 RX ORDER — BUSPIRONE HYDROCHLORIDE 10 MG/1
10 TABLET ORAL 2 TIMES DAILY
Qty: 60 TABLET | Refills: 0 | Status: SHIPPED | OUTPATIENT
Start: 2022-04-20 | End: 2022-06-16 | Stop reason: SDUPTHER

## 2022-04-20 RX ORDER — VENLAFAXINE HYDROCHLORIDE 150 MG/1
150 CAPSULE, EXTENDED RELEASE ORAL DAILY
Qty: 30 CAPSULE | Refills: 0 | Status: SHIPPED | OUTPATIENT
Start: 2022-04-20 | End: 2022-05-23 | Stop reason: SDUPTHER

## 2022-05-04 ENCOUNTER — TELEMEDICINE (OUTPATIENT)
Dept: PSYCHIATRY | Facility: CLINIC | Age: 27
End: 2022-05-04

## 2022-05-04 DIAGNOSIS — F43.10 POST TRAUMATIC STRESS DISORDER (PTSD): Primary | ICD-10-CM

## 2022-05-04 PROCEDURE — 90837 PSYTX W PT 60 MINUTES: CPT | Performed by: SOCIAL WORKER

## 2022-05-04 NOTE — PROGRESS NOTES
[facility]   Follow-up Progress Note     Date: May 17, 2022  Time In: 9:32AM  Time Out: 10:34 AM      PROGRESS NOTE  Data:  Genesis Chapin is a 27 y.o. female presenting to [facility] for follow-up with Kayla Frank LCSW. The patient is seen remotely using [facility] My Chart. Patient is being seen via telehealth and stated they are in a secure environment for this session.  Patient is located in her home.  The patient's condition being diagnosed/treated is appropriate for telemedicine. The provider identified herself as well as her credentials. The patient and/or patients guardian consent to be seen remotely, and when consent is given they understand that the consent allows for patient identifiable information to be sent to a third party as needed. They may refuse to be seen remotely at any time. The electronic data is encrypted and password protected, and the patient has been advised of the potential risks to privacy not withstanding such measures.    Today Patient presentend for follow up. She stated things are going really well. She stated her trip went great. She was able to let loose and relax.  Patient and provider continued CPT.  She completed PCL 5 with a score of 35.  Patient did not do all of her homework due to being on vacation but did identify stuck points but she completed the challenging beliefs worksheet on.  Patient was reassigned impact statement and will review at next session.    Clinical Maneuvering/Intervention: CPT    Assisted Patient in processing above session content; acknowledged and normalized patient’s thoughts, feelings, and concerns.  Rationalized patient thought process regarding stuck points.  Discussed triggers associated with patient's PTSD.  Also discussed coping skills for patient to implement such as cognitive reprocessing.    Allowed Patient to freely discuss issues  without interruption or  judgement with unconditional positive regard, active listening skills, and empathy. Therapist provided a safe, confidential environment to facilitate the development of a positive therapeutic relationship and encouraged open, honest communication. Assisted Patient in identifying risk factors which would indicate the need for higher level of care including thoughts to harm self or others and/or self-harming behavior and encouraged Patient to contact this office, call 911, or present to the nearest emergency room should any of these events occur. Discussed crisis intervention services and means to access. Patient adamantly and convincingly denies current suicidal or homicidal ideation or perceptual disturbance. Assisted Patient in processing session content; acknowledged and normalized Patient’s thoughts, feelings, and concerns by utilizing a person-centered approach in efforts to build appropriate rapport and a positive therapeutic relationship with open and honest communication. Therapist utilized dialectical behavior techniques to teach and model emotional regulation and relaxation methods. Therapist assisted Patient with identifying and implementing healthier coping strategies.     Assessment   Patient appears to be maintaining relative stability as compared to baseline.  Patient continues to struggle with PTSD.   As a result, they can be reasonably expected to continue to benefit from treatment and would likely be at increased risk for decompensation otherwise.    Mental Status Exam:   Hygiene:   good  Cooperation:  Cooperative  Eye Contact:  Good  Psychomotor Behavior:  Appropriate  Affect:  Appropriate  Mood: normal  Speech:  Normal  Thought Process:  Goal directed  Thought Content:  Normal  Suicidal:  None  Homicidal:  None  Hallucinations:  None  Delusion:  None  Memory:  Intact  Orientation:  Person, Place, Time and Situation  Reliability:  good  Insight:  Good  Judgement:  Good  Impulse Control:   Fair  Physical/Medical Issues:  No      PHQ-Score Total:  PHQ-9 Total Score:      JUANCARLOS-7:       Patient's Support Network Includes:      Functional Status: Moderate impairment     Progress toward goal: Not at goal    Prognosis: Good with Ongoing Treatment            Impression/Formulation:    VISIT DIAGNOSIS:     ICD-10-CM ICD-9-CM   1. Post traumatic stress disorder (PTSD)  F43.10 309.81        Patient appeared alert and oriented.  Patient is voluntarily requesting to continue outpatient therapy at [facility].  Patient is receptive to assistance with maintaining a stable lifestyle.  Patient presents with history of PTSD.  Patient is agreeable to attend routine therapy sessions.  Patient expressed desire to maintain stability and participate in the therapeutic process.        Crisis Plan:  If symptoms/behaviors persist, Patient will present to the nearest hospital for an assessment. Advised patient of [facility] ER and assessment services.     Plan:   Patient will continue in individual outpatient therapy with focus on improved functioning and coping skills, maintaining stability, and avoiding decompensation and the need for higher level of care.    Patient will contact this office, call 911 or present to the nearest emergency room should suicidal or homicidal ideations occur. Provide Cognitive Behavioral Therapy and Solution Focused Therapy to improve functioning, maintain stability, and avoid decompensation and the need for higher level of care.     Return in about 1 weeks, or earlier if symptoms worsen or fail to improve.    Recommended Referrals: none        This document has been electronically signed by Kayla Frank LCSW  May 17, 2022 09:04 EDT        Part of this note may be an electronic transcription/translation of spoken language to printed text using the Dragon Dictation System.

## 2022-05-16 ENCOUNTER — TELEPHONE (OUTPATIENT)
Dept: PSYCHIATRY | Facility: CLINIC | Age: 27
End: 2022-05-16

## 2022-05-16 NOTE — TELEPHONE ENCOUNTER
Made patient aware. Patient would like the list of clinics sent to her.  Told patient that I would e-mail it to her.      Emailed to patient at 4:32 pm on 05/16/2022.

## 2022-05-16 NOTE — TELEPHONE ENCOUNTER
Patient no showed appointment and then called and wanted to be rescheduled to see provider. Patient was given the next available appointment and added to the wait list.  Patient wants to speak to provider about getting on ADHD medication.

## 2022-05-18 ENCOUNTER — TELEMEDICINE (OUTPATIENT)
Dept: PSYCHIATRY | Facility: CLINIC | Age: 27
End: 2022-05-18

## 2022-05-18 DIAGNOSIS — F43.10 POST TRAUMATIC STRESS DISORDER (PTSD): Primary | ICD-10-CM

## 2022-05-18 PROCEDURE — 90837 PSYTX W PT 60 MINUTES: CPT | Performed by: SOCIAL WORKER

## 2022-05-18 NOTE — PROGRESS NOTES
[facility]   Follow-up Progress Note     Date: May 25, 2022  Time In: 9:31AM  Time Out: 10:31 AM      PROGRESS NOTE  Data:  Genesis Chapin is a 27 y.o. female presenting to [facility] for follow-up with Kayla Frank LCSW. The patient is seen remotely using [facility] My Chart.  Patient is located in her home.  Patient is being seen via telehealth and stated they are in a secure environment for this session. The patient's condition being diagnosed/treated is appropriate for telemedicine. The provider identified herself as well as her credentials. The patient and/or patients guardian consent to be seen remotely, and when consent is given they understand that the consent allows for patient identifiable information to be sent to a third party as needed. They may refuse to be seen remotely at any time. The electronic data is encrypted and password protected, and the patient has been advised of the potential risks to privacy not withstanding such measures.    Today Patient presented for follow-up appointment.  Patient checked in with how she did on the homework and stated she did not do any homework.  Patient and provider discussed nonadherence and avoidant and how this has impacted progress.  Patient and provider discussed ways to continue maintenance of skills daily to improve symptoms.  Patient discussed possibility of ADHD and that she would like to be evaluated for this.  Patient and provider discussed upcoming termination with current therapist due to current therapist leaving.  Patient will transition to a new therapist in June and will complete termination with current therapist during next few sessions  Clinical Maneuvering/Intervention: CPT    Assisted Patient in processing above session content; acknowledged and normalized patient’s thoughts, feelings, and concerns.  Rationalized patient thought process regarding progress.   Discussed triggers associated with patient's PTSD.  Also discussed coping skills for patient to implement such as cognitive reprocessing.    Allowed Patient to freely discuss issues  without interruption or judgement with unconditional positive regard, active listening skills, and empathy. Therapist provided a safe, confidential environment to facilitate the development of a positive therapeutic relationship and encouraged open, honest communication. Assisted Patient in identifying risk factors which would indicate the need for higher level of care including thoughts to harm self or others and/or self-harming behavior and encouraged Patient to contact this office, call 911, or present to the nearest emergency room should any of these events occur. Discussed crisis intervention services and means to access. Patient adamantly and convincingly denies current suicidal or homicidal ideation or perceptual disturbance. Assisted Patient in processing session content; acknowledged and normalized Patient’s thoughts, feelings, and concerns by utilizing a person-centered approach in efforts to build appropriate rapport and a positive therapeutic relationship with open and honest communication. Therapist utilized dialectical behavior techniques to teach and model emotional regulation and relaxation methods. Therapist assisted Patient with identifying and implementing healthier coping strategies.     Assessment   Patient appears to be maintaining relative stability as compared to baseline.  Patient continues to struggle with PTSD.   As a result, they can be reasonably expected to continue to benefit from treatment and would likely be at increased risk for decompensation otherwise.    Mental Status Exam:   Hygiene:   good  Cooperation:  Cooperative  Eye Contact:  Good  Psychomotor Behavior:  Appropriate  Affect:  Appropriate  Mood: normal  Speech:  Normal  Thought Process:  Goal directed  Thought Content:  Normal  Suicidal:   None  Homicidal:  None  Hallucinations:  None  Delusion:  None  Memory:  Intact  Orientation:  Person, Place, Time and Situation  Reliability:  good  Insight:  Good  Judgement:  Good  Impulse Control:  Good  Physical/Medical Issues:  No      PHQ-Score Total:  PHQ-9 Total Score:      JUANCARLOS-7:       Patient's Support Network Includes:      Functional Status: Moderate impairment     Progress toward goal: Not at goal    Prognosis: Good with Ongoing Treatment            Impression/Formulation:    VISIT DIAGNOSIS:     ICD-10-CM ICD-9-CM   1. Post traumatic stress disorder (PTSD)  F43.10 309.81        Patient appeared alert and oriented.  Patient is voluntarily requesting to continue outpatient therapy at [facility].  Patient is receptive to assistance with maintaining a stable lifestyle.  Patient presents with history of PTSD.  Patient is agreeable to attend routine therapy sessions.  Patient expressed desire to maintain stability and participate in the therapeutic process.        Crisis Plan:  If symptoms/behaviors persist, Patient will present to the nearest hospital for an assessment. Advised patient of [facility] ER and assessment services.     Plan:   Patient will continue in individual outpatient therapy with focus on improved functioning and coping skills, maintaining stability, and avoiding decompensation and the need for higher level of care.    Patient will contact this office, call 911 or present to the nearest emergency room should suicidal or homicidal ideations occur. Provide Cognitive Behavioral Therapy and Solution Focused Therapy to improve functioning, maintain stability, and avoid decompensation and the need for higher level of care.     Return in about 1 weeks, or earlier if symptoms worsen or fail to improve.    Recommended Referrals: none        This document has been electronically signed by Kayla Frank LCSW  May 25, 2022 08:55 EDT        Part of this  note may be an electronic transcription/translation of spoken language to printed text using the Dragon Dictation System.

## 2022-05-23 DIAGNOSIS — F43.10 POST TRAUMATIC STRESS DISORDER (PTSD): Chronic | ICD-10-CM

## 2022-05-23 DIAGNOSIS — F39 UNSPECIFIED MOOD (AFFECTIVE) DISORDER: Chronic | ICD-10-CM

## 2022-05-23 DIAGNOSIS — F41.0 PANIC DISORDER (EPISODIC PAROXYSMAL ANXIETY): ICD-10-CM

## 2022-05-23 DIAGNOSIS — F41.9 ANXIETY DISORDER, UNSPECIFIED TYPE: Chronic | ICD-10-CM

## 2022-05-23 RX ORDER — VENLAFAXINE HYDROCHLORIDE 150 MG/1
150 CAPSULE, EXTENDED RELEASE ORAL DAILY
Qty: 30 CAPSULE | Refills: 0 | Status: SHIPPED | OUTPATIENT
Start: 2022-05-23 | End: 2022-06-16 | Stop reason: SDUPTHER

## 2022-05-25 ENCOUNTER — TELEMEDICINE (OUTPATIENT)
Dept: PSYCHIATRY | Facility: CLINIC | Age: 27
End: 2022-05-25

## 2022-05-25 DIAGNOSIS — F43.10 POST TRAUMATIC STRESS DISORDER (PTSD): Primary | ICD-10-CM

## 2022-05-25 PROCEDURE — 90837 PSYTX W PT 60 MINUTES: CPT | Performed by: SOCIAL WORKER

## 2022-05-27 DIAGNOSIS — F43.10 POST TRAUMATIC STRESS DISORDER (PTSD): Chronic | ICD-10-CM

## 2022-05-27 DIAGNOSIS — F39 UNSPECIFIED MOOD (AFFECTIVE) DISORDER: Chronic | ICD-10-CM

## 2022-05-27 DIAGNOSIS — R45.4 IRRITABILITY: ICD-10-CM

## 2022-05-27 DIAGNOSIS — F41.0 PANIC DISORDER (EPISODIC PAROXYSMAL ANXIETY): ICD-10-CM

## 2022-05-27 DIAGNOSIS — F41.9 ANXIETY DISORDER, UNSPECIFIED TYPE: Chronic | ICD-10-CM

## 2022-05-27 DIAGNOSIS — R45.86 MOOD SWINGS: ICD-10-CM

## 2022-05-31 DIAGNOSIS — F41.9 ANXIETY DISORDER, UNSPECIFIED TYPE: Chronic | ICD-10-CM

## 2022-05-31 DIAGNOSIS — F39 UNSPECIFIED MOOD (AFFECTIVE) DISORDER: Chronic | ICD-10-CM

## 2022-05-31 DIAGNOSIS — R45.4 IRRITABILITY: ICD-10-CM

## 2022-05-31 DIAGNOSIS — R45.86 MOOD SWINGS: ICD-10-CM

## 2022-05-31 RX ORDER — LAMOTRIGINE 100 MG/1
100 TABLET ORAL 2 TIMES DAILY
Qty: 60 TABLET | Refills: 0 | Status: SHIPPED | OUTPATIENT
Start: 2022-05-31 | End: 2022-06-16 | Stop reason: SDUPTHER

## 2022-05-31 RX ORDER — ARIPIPRAZOLE 2 MG/1
2 TABLET ORAL DAILY
Qty: 30 TABLET | Refills: 0 | Status: SHIPPED | OUTPATIENT
Start: 2022-05-31 | End: 2022-06-16 | Stop reason: SDUPTHER

## 2022-05-31 RX ORDER — ARIPIPRAZOLE 2 MG/1
2 TABLET ORAL DAILY
Qty: 30 TABLET | Refills: 0 | OUTPATIENT
Start: 2022-05-31

## 2022-06-02 ENCOUNTER — TELEMEDICINE (OUTPATIENT)
Dept: PSYCHIATRY | Facility: CLINIC | Age: 27
End: 2022-06-02

## 2022-06-02 DIAGNOSIS — F43.10 POST TRAUMATIC STRESS DISORDER (PTSD): Primary | ICD-10-CM

## 2022-06-02 PROCEDURE — 90837 PSYTX W PT 60 MINUTES: CPT | Performed by: SOCIAL WORKER

## 2022-06-02 NOTE — PROGRESS NOTES
[facility]   Follow-up Progress Note     Date: Malathi 15, 2022  Time In: 8:35AM  Time Out: 9:55 AM      PROGRESS NOTE  Data:  Genesis Chapin is a 27 y.o. female presenting to [facility] for follow-up with Kayla Frank LCSW. The patient is seen remotely using [facility] My Chart.  Patient is located in her home.  Patient is being seen via telehealth and stated they are in a secure environment for this session.  Patient is located in her home.  The patient's condition being diagnosed/treated is appropriate for telemedicine. The provider identified herself as well as her credentials. The patient and/or patients guardian consent to be seen remotely, and when consent is given they understand that the consent allows for patient identifiable information to be sent to a third party as needed. They may refuse to be seen remotely at any time. The electronic data is encrypted and password protected, and the patient has been advised of the potential risks to privacy not withstanding such measures.    Today Patient presented for follow-up appointment.  She completed PCL 5 with a score of 17.  Patient completed final IMPAC statement and read this during session.  Patient and provider compared the changes in her thoughts compared to her first impact statement.  Patient identified significant changes in her thought process and reviewed her progress and improvement of symptoms.  Patient expressed difficult emotions related to terminating therapy with current provider but was able to identify how she can manage this.  Provider pointed out ways patient has been managing PTSD, anxiety and panic are much better and that she is capable of maintaining his progress moving forward with next therapist.  Patient and provider discussed ways to continue to utilize CBT skills for ongoing maintenance of symptoms.    Clinical Maneuvering/Intervention: Transition  planning  Assisted Patient in processing above session content; acknowledged and normalized patient’s thoughts, feelings, and concerns.  Rationalized patient thought process regarding termination.  Discussed triggers associated with patient's anxiety.  Also discussed coping skills for patient to implement such as thought reframing.    Allowed Patient to freely discuss issues  without interruption or judgement with unconditional positive regard, active listening skills, and empathy. Therapist provided a safe, confidential environment to facilitate the development of a positive therapeutic relationship and encouraged open, honest communication. Assisted Patient in identifying risk factors which would indicate the need for higher level of care including thoughts to harm self or others and/or self-harming behavior and encouraged Patient to contact this office, call 911, or present to the nearest emergency room should any of these events occur. Discussed crisis intervention services and means to access. Patient adamantly and convincingly denies current suicidal or homicidal ideation or perceptual disturbance. Assisted Patient in processing session content; acknowledged and normalized Patient’s thoughts, feelings, and concerns by utilizing a person-centered approach in efforts to build appropriate rapport and a positive therapeutic relationship with open and honest communication. Therapist utilized dialectical behavior techniques to teach and model emotional regulation and relaxation methods. Therapist assisted Patient with identifying and implementing healthier coping strategies.     Assessment   Patient appears to be maintaining relative stability as compared to baseline.  Patient continues to struggle with anxiety.   As a result, they can be reasonably expected to continue to benefit from treatment and would likely be at increased risk for decompensation otherwise.    Mental Status Exam:   Hygiene:   good  Cooperation:   Cooperative  Eye Contact:  Good  Psychomotor Behavior:  Appropriate  Affect:  Appropriate  Mood: normal  Speech:  Normal  Thought Process:  Goal directed  Thought Content:  Normal  Suicidal:  None  Homicidal:  None  Hallucinations:  None  Delusion:  None  Memory:  Intact  Orientation:  Person, Place, Time and Situation  Reliability:  good  Insight:  Good  Judgement:  Good  Impulse Control:  Good  Physical/Medical Issues:  No      PHQ-Score Total:  PHQ-9 Total Score:      JUANCARLOS-7:       Patient's Support Network Includes:      Functional Status: Moderate impairment     Progress toward goal: Not at goal    Prognosis: Good with Ongoing Treatment            Impression/Formulation:    VISIT DIAGNOSIS:     ICD-10-CM ICD-9-CM   1. Post traumatic stress disorder (PTSD)  F43.10 309.81        Patient appeared alert and oriented.  Patient is voluntarily requesting to continue outpatient therapy at [facility].  Patient is receptive to assistance with maintaining a stable lifestyle.  Patient presents with history of anxiety and PTSD.  Patient is agreeable to attend routine therapy sessions.  Patient expressed desire to maintain stability and participate in the therapeutic process.        Crisis Plan:  If symptoms/behaviors persist, Patient will present to the nearest hospital for an assessment. Advised patient of [facility] ER and assessment services.     Plan:   Patient will continue in individual outpatient therapy with focus on improved functioning and coping skills, maintaining stability, and avoiding decompensation and the need for higher level of care.    Patient will contact this office, call 911 or present to the nearest emergency room should suicidal or homicidal ideations occur. Provide Cognitive Behavioral Therapy and Solution Focused Therapy to improve functioning, maintain stability, and avoid decompensation and the need for higher level of care.     Return in about 1  weeks, or earlier if symptoms worsen or fail to improve.    Recommended Referrals: Referral for new therapist and office        This document has been electronically signed by Kayla Frank LCSW  Malathi 15, 2022 09:06 EDT        Part of this note may be an electronic transcription/translation of spoken language to printed text using the Dragon Dictation System.

## 2022-06-15 ENCOUNTER — TELEMEDICINE (OUTPATIENT)
Dept: PSYCHIATRY | Facility: CLINIC | Age: 27
End: 2022-06-15

## 2022-06-15 DIAGNOSIS — F43.10 POST TRAUMATIC STRESS DISORDER (PTSD): Primary | ICD-10-CM

## 2022-06-15 PROCEDURE — 90837 PSYTX W PT 60 MINUTES: CPT | Performed by: COUNSELOR
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
## 2022-06-16 ENCOUNTER — TELEMEDICINE (OUTPATIENT)
Dept: PSYCHIATRY | Facility: CLINIC | Age: 27
End: 2022-06-16

## 2022-06-16 DIAGNOSIS — F41.9 ANXIETY DISORDER, UNSPECIFIED TYPE: Chronic | ICD-10-CM

## 2022-06-16 DIAGNOSIS — F39 UNSPECIFIED MOOD (AFFECTIVE) DISORDER: Primary | Chronic | ICD-10-CM

## 2022-06-16 DIAGNOSIS — F41.0 PANIC DISORDER (EPISODIC PAROXYSMAL ANXIETY): Chronic | ICD-10-CM

## 2022-06-16 DIAGNOSIS — R45.86 MOOD SWINGS: ICD-10-CM

## 2022-06-16 DIAGNOSIS — R45.4 IRRITABILITY: ICD-10-CM

## 2022-06-16 PROCEDURE — 99214 OFFICE O/P EST MOD 30 MIN: CPT | Performed by: NURSE PRACTITIONER

## 2022-06-16 RX ORDER — LAMOTRIGINE 100 MG/1
100 TABLET ORAL 2 TIMES DAILY
Qty: 60 TABLET | Refills: 0 | Status: SHIPPED | OUTPATIENT
Start: 2022-06-16 | End: 2022-08-24 | Stop reason: SDUPTHER

## 2022-06-16 RX ORDER — CHLORHEXIDINE GLUCONATE 0.12 MG/ML
RINSE ORAL
COMMUNITY
Start: 2022-06-13 | End: 2022-06-16

## 2022-06-16 RX ORDER — BUSPIRONE HYDROCHLORIDE 10 MG/1
10 TABLET ORAL 2 TIMES DAILY
Qty: 60 TABLET | Refills: 0 | Status: SHIPPED | OUTPATIENT
Start: 2022-06-16 | End: 2022-08-24 | Stop reason: SDUPTHER

## 2022-06-16 RX ORDER — VENLAFAXINE HYDROCHLORIDE 150 MG/1
150 CAPSULE, EXTENDED RELEASE ORAL DAILY
Qty: 30 CAPSULE | Refills: 0 | Status: SHIPPED | OUTPATIENT
Start: 2022-06-16 | End: 2022-08-24 | Stop reason: SDUPTHER

## 2022-06-16 RX ORDER — HYDROCODONE BITARTRATE AND ACETAMINOPHEN 7.5; 325 MG/1; MG/1
TABLET ORAL
COMMUNITY
Start: 2022-06-13 | End: 2022-06-16

## 2022-06-16 RX ORDER — DEXTROAMPHETAMINE/AMPHETAMINE 10 MG
CAPSULE, EXT RELEASE 24 HR ORAL
COMMUNITY
Start: 2022-05-25 | End: 2022-08-16

## 2022-06-16 RX ORDER — ARIPIPRAZOLE 2 MG/1
2 TABLET ORAL DAILY
Qty: 30 TABLET | Refills: 0 | Status: SHIPPED | OUTPATIENT
Start: 2022-06-16 | End: 2022-08-16

## 2022-06-16 NOTE — PROGRESS NOTES
"This provider is located at the [facility] (through [facility]), [address], [place], [address] using a secure [employer] Video Visit through [employer]tee. Patient is being seen remotely via telehealth at their home address in [place], and stated they are in a secure environment for this session. The patient's condition being diagnosed/treated is appropriate for telemedicine. The provider identified herself as well as her credentials. The patient, and/or patients guardian, consent to be seen remotely, and when consent is given they understand that the consent allows for patient identifiable information to be sent to a third party as needed. They may refuse to be seen remotely at any time. The electronic data is encrypted and password protected, and the patient and/or guardian has been advised of the potential risks to privacy not withstanding such measures.    You have chosen to receive care through a telehealth visit.  Do you consent to use a video/audio connection for your medical care today? Yes     Subjective   Genesis Chapin is a 27 y.o. female who is here today for medication management follow up.    Chief Complaint: Depression, anxiety, panic attacks, mood swings, irritability    History of Present Illness:  The patient describes her mood as \"pretty good\" over the last few weeks.  The patient endorses that overall she has been doing well over the past few weeks.  The patient states that she has been feeling a lot better since beginning the Abilify.  The patient states that her mood has been more stable and her depression has been much more well-controlled.  The patient states that she has also noticed an improvement in her anxiety recently.  She states that her anxiety is \"still there\", but states that states that it has been improved recently.  The patient reports her appetite as good.  The patient reports her sleep as good.  The patient states that she has been " sleeping much better since starting the Abilify.  The patient denies any nightmares or bad dreams.  The patient reports compliance with current medication regimen.  The patient denies any current side effects from her current medication regimen.  The patient states that her primary care provider recently started her on an ADHD medication.  The patient states that her PCP felt that she does have ADHD and wanted to begin treatment for this.  The patient states that she has been seeing her current PCP for approximately 1 year now.  The patient states that her provider wanted to try her on Qelbree as she felt this would be a better option for the patient given her other psychotropic medications and psychiatric diagnoses, but states that the insurance would not approve medication so she started her on Adderall XR 10 mg daily.  The patient states that she her PCP is aware that she is currently seeing a psychiatric provider for medication management, but states that she was still agreeable to begin treating her for ADHD.  The patient states that she has been feeling a lot better since beginning the Adderall XR.  The patient states that she is going back to school now and states that the Adderall XR has been helping with her focus and concentration in order to complete school tasks and assignments.  The patient states that she has only been using the Adderall XR as needed for school and has not been taking it daily.  The patient denies any abnormal muscle movements or tics.  The patient rates her depression at a 2/10 on a 0-10 scale, with 10 being the worst.  The patient rates her anxiety at a 4/10 on a 0-10 scale, with 10 being the worst.  The patient rates hopelessness at a 0/10 on a 0-10 scale with 10 being the worst.  The patient denies suicidal ideations and homicidal ideations, and is convincing.  The patient denies any auditory hallucinations or visual hallucinations.  The patient does not endorse significant  symptoms consistent with a psychotic illness.                LMP:  4 weeks ago.  The patient denies any chance of pregnancy at this time.  The patient was educated that her prescribed medications can have potential risk to a developing fetus. The patient is advised to contact this APRN/this office if she becomes pregnant or plans to become pregnant.  Pt verbalizes understanding and acknowledged agreement with this plan in her own words.          Past Psychiatric Medications:  Lexapro 10 mg daily - effective for depression, but ineffective for anxiety   Klonopin 0.5 mg three times daily as needed for anxiety/panic  - effective but discontinued due to marijuana use  Hydroxyzine 25 mg three times daily as needed for anxiety/panic - ineffective  Seroquel - trialed 12.5 mg twice daily then 25 mg nightly.  Effective for irritability/mood swings, but caused sedation at both doses and unable to tolerate.         The following portions of the patient's history were reviewed and updated as appropriate: allergies, current medications, past family history, past medical history, past social history, past surgical history and problem list.    Review of Systems   Constitutional: Negative for activity change, appetite change, fatigue and unexpected weight change.   Psychiatric/Behavioral: Positive for dysphoric mood. Negative for agitation, behavioral problems, confusion, decreased concentration, hallucinations, self-injury, sleep disturbance and suicidal ideas. The patient is nervous/anxious. The patient is not hyperactive.        Objective   Physical Exam  Constitutional:       Appearance: Normal appearance.   Neurological:      Mental Status: She is alert.   Psychiatric:         Attention and Perception: Attention and perception normal.         Mood and Affect: Affect normal. Mood is anxious. Mood is not depressed.         Speech: Speech normal.         Behavior: Behavior is agitated. Behavior is cooperative.         Thought  Content: Thought content normal. Thought content does not include homicidal or suicidal ideation. Thought content does not include homicidal or suicidal plan.         Cognition and Memory: Cognition and memory normal.         Judgment: Judgment is impulsive.       not currently breastfeeding.    The patient was seen remotely today via a MyChart Video Visit through [facility].  Unable to obtain vital signs due to nature of remote visit.  Height stated at 67 inches.  Weight stated at 250 pounds.       Allergies   Allergen Reactions   • Amoxicillin Nausea And Vomiting   • Penicillins Nausea And Vomiting       No results found for this or any previous visit (from the past 2016 hour(s)).    Current Medications:   Current Outpatient Medications   Medication Sig Dispense Refill   • Adderall XR 10 MG 24 hr capsule      • ARIPiprazole (ABILIFY) 2 MG tablet Take 1 tablet by mouth Daily. 30 tablet 0   • busPIRone (BUSPAR) 10 MG tablet Take 1 tablet by mouth 2 (Two) Times a Day. 60 tablet 0   • lamoTRIgine (LaMICtal) 100 MG tablet Take 1 tablet by mouth 2 (Two) Times a Day. 60 tablet 0   • propranolol (INDERAL) 10 MG tablet Take 1 tablet by mouth 2 (Two) Times a Day As Needed (anxiety/panic). 60 tablet 0   • venlafaxine XR (EFFEXOR-XR) 150 MG 24 hr capsule Take 1 capsule by mouth Daily. 30 capsule 0   • clindamycin (CLEOCIN) 150 MG capsule      • cyclobenzaprine (FLEXERIL) 10 MG tablet      • Junel 1.5/30 1.5-30 MG-MCG tablet        No current facility-administered medications for this visit.       Mental Status Exam:   Hygiene:   good  Cooperation:  Cooperative  Eye Contact:  Good  Psychomotor Behavior:  Aggitated  Affect:  Full range  Hopelessness: Denies  Speech:  Normal  Thought Process:  Goal directed  Thought Content:  Normal  Suicidal:  None  Homicidal:  None  Hallucinations:  None  Delusion:  None  Memory:  Intact  Orientation:  Person, Place, Time and Situation  Reliability:  poor  Insight:  Poor  Judgement:  Poor  Impulse  Control:  Poor  Physical/Medical Issues:  Yes See medical history        The ANIL report, request number [id number], of the past 12 months were reviewed.        Assessment & Plan   Diagnoses and all orders for this visit:    1. Unspecified mood (affective) disorder (HCC) (Primary)  -     ARIPiprazole (ABILIFY) 2 MG tablet; Take 1 tablet by mouth Daily.  Dispense: 30 tablet; Refill: 0  -     busPIRone (BUSPAR) 10 MG tablet; Take 1 tablet by mouth 2 (Two) Times a Day.  Dispense: 60 tablet; Refill: 0  -     lamoTRIgine (LaMICtal) 100 MG tablet; Take 1 tablet by mouth 2 (Two) Times a Day.  Dispense: 60 tablet; Refill: 0  -     venlafaxine XR (EFFEXOR-XR) 150 MG 24 hr capsule; Take 1 capsule by mouth Daily.  Dispense: 30 capsule; Refill: 0    2. Anxiety disorder, unspecified type  -     ARIPiprazole (ABILIFY) 2 MG tablet; Take 1 tablet by mouth Daily.  Dispense: 30 tablet; Refill: 0  -     busPIRone (BUSPAR) 10 MG tablet; Take 1 tablet by mouth 2 (Two) Times a Day.  Dispense: 60 tablet; Refill: 0  -     lamoTRIgine (LaMICtal) 100 MG tablet; Take 1 tablet by mouth 2 (Two) Times a Day.  Dispense: 60 tablet; Refill: 0  -     venlafaxine XR (EFFEXOR-XR) 150 MG 24 hr capsule; Take 1 capsule by mouth Daily.  Dispense: 30 capsule; Refill: 0    3. Panic disorder (episodic paroxysmal anxiety)  -     busPIRone (BUSPAR) 10 MG tablet; Take 1 tablet by mouth 2 (Two) Times a Day.  Dispense: 60 tablet; Refill: 0  -     lamoTRIgine (LaMICtal) 100 MG tablet; Take 1 tablet by mouth 2 (Two) Times a Day.  Dispense: 60 tablet; Refill: 0  -     venlafaxine XR (EFFEXOR-XR) 150 MG 24 hr capsule; Take 1 capsule by mouth Daily.  Dispense: 30 capsule; Refill: 0    4. Mood swings  -     ARIPiprazole (ABILIFY) 2 MG tablet; Take 1 tablet by mouth Daily.  Dispense: 30 tablet; Refill: 0  -     lamoTRIgine (LaMICtal) 100 MG tablet; Take 1 tablet by mouth 2 (Two) Times a Day.  Dispense: 60 tablet; Refill: 0    5. Irritability  -     ARIPiprazole  (ABILIFY) 2 MG tablet; Take 1 tablet by mouth Daily.  Dispense: 30 tablet; Refill: 0  -     lamoTRIgine (LaMICtal) 100 MG tablet; Take 1 tablet by mouth 2 (Two) Times a Day.  Dispense: 60 tablet; Refill: 0              Visit Diagnoses:    ICD-10-CM ICD-9-CM   1. Unspecified mood (affective) disorder ([facility])  F39 296.90   2. Anxiety disorder, unspecified type  F41.9 300.00   3. Panic disorder (episodic paroxysmal anxiety)  F41.0 300.01   4. Mood swings  R45.86 799.24   5. Irritability  R45.4 799.22       GOALS:  Short Term Goals: Patient will be compliant with medication, and patient will have no significant medication related side effects.  Patient will be engaged in psychotherapy as indicated.  Patient will report subjective improvement of symptoms.  Long term goals: To stabilize mood and treat/improve subjective symptoms, the patient will stay out of the hospital, the patient will be at an optimal level of functioning, and the patient will take all medications as prescribed.  The patient verbalized understanding and agreement with goals that were mutually set.      SUICIDE RISK ASSESSMENT: Unalterable demographics and a history of mental health intervention indicate this patient is in a high risk category compared to the general population. At present, the patient denies active SI/HI, intentions, or plans at this time and agrees to seek immediate care should such thoughts develop. The patient verbalizes understanding of how to access emergency care if needed and agrees to do so. Consideration of suicide risk and protective factors such as history, current presentation, individual strengths and weaknesses, psychosocial and environmental stressors and variables, psychiatric illness and symptoms, medical conditions and pain, took place in this interview. Based on those considerations, the patient is determined: within individual baseline and presenting no imminent risk for suicide or homicide. Other recommendations:  The patient does not meet the criteria for inpatient admission and is not a safety risk to self or others at today's visit. Inpatient treatment offers no significant advantages over outpatient treatment for this patient at today's visit.      SAFETY PLAN:  Patient was given ample time for questions and fully participated in treatment planning.  Patient was encouraged to call the clinic with any questions or concerns.  Patient was informed of access to emergency care. If patient were to develop any significant symptomatology, suicidal ideation, homicidal ideation, any concerns, or feel unsafe at any time they are to call the clinic and if unable to get immediate assistance should immediately call 911 or go to the nearest emergency room.  The patient is advised to remove or secure (lock away) all lethal weapons (including guns) and sharps (including razors, scissors, knives, etc.).  All medications (including any prescribed and any over the counter medications) should be stored in a safe and secured location that is not obtainable by children/adolescents.  Patient was given an opportunity and encouraged to ask questions about their medication, illness, and treatment. Patient contracted verbally for the following: If you are experiencing an emotional crisis or have thoughts of harming yourself or others, please go to your nearest local emergency room or call 911. Will continue to re-assess medication response and side effects frequently to establish efficacy and ensure safety. Risks, any black box warnings, side effects, off label usage, and benefits of medication and treatment discussed with patient, along with potential adverse side effects of current and/or newly prescribed medication, alternative treatment options, and OTC medications.  Patient verbalized understanding of potential risks, any off label use of medication, any black box warnings, and any side effects in their own words. The patient verbalized  understanding and agreed to comply with the safety plan discussed in their own words.  Patient given the number to the office. Number also available to the 24- hour suicide hotline.      TREATMENT PLAN/GOALS: Continue medications and treatment plan as indicated. Treatment and medication options discussed during today's visit. Patient acknowledged and verbally consented to continue with current treatment plan and was educated on the importance of compliance with treatment and follow-up appointments.        -Discussed with the patient that two providers should not be managing her psychotropic medications.  Discussed with the patient that it is recommended that only 1 provider manage her psychiatric medications to ensure patient safety and appropriate continuity of care.  Discussed with patient that this APRN does not agree that her difficulties with focus and concentration are related to ADHD, but rather related to a mood disorder/anxiety.  Discussed with patient that this APRN does not recommend treatment for ADHD with a stimulant medication as this potentially exacerbate symptoms of mood disorder and/or anxiety.  Discussed with patient that this APRN also does not recommend treatment with a stimulant medication as she currently uses THC/marijuana and it is contraindicated to use illicit substances and stimulant medication concurrently.  The patient endorses that the Adderall XR has been significantly helpful for her in managing her difficulties with focus and concentration so states that she would prefer to continue this medication.  Discussed with the patient that she can continue seeing her PCP who has been managing this medication as this APRN does not recommend treatment with a stimulant medication, so discussed with the patient that if she chooses to continue treatment with her PCP this provider will need to assume responsibility and prescriptive management for her current psychotropic medications that this  APRN is currently prescribing/managing to ensure continuity of care and patient safety.  The patient verbalized understanding and endorses that she is agreeable to this.  The patient states that she will be reaching out her PCP to discuss assuming prescriptive responsibility for her psychotropic medications that this APRN has been managing so that she may continue seeing her for ADHD treatment.  Discussed with the patient that if she has any questions/concerns or would like to resume care with this APRN to notify the clinic to reschedule for a follow-up appointment.  The patient verbalizes understanding and endorses that she is agreeable to this.  Discussed with patient that refills for her current psychotropic medications that this APRN has been managing will be sent in at this time so she does not run out before PCP can begin managing these medications.  -Continue Abilify 2 mg daily for mood stabilization   -Continue Lamictal 100 mg twice daily for mood stabilization  -Continue Effexor  mg daily for mood/anxiety  -Continue Buspar 10 mg twice daily as adjunct for mood/anxiety   -Continue Propanolol 10 mg twice daily as needed for anxiety/panic  -Continue psychotherapy with Ingrid R[facility]  -Rule out bipolar/mood disorder        MEDICATION ISSUES: Discussed medication options and treatment plan of prescribed medication, any off label use of medication, as well as the risks, benefits, any black box warnings including increased suicidality, and side effects including but not limited to potential falls, dizziness, possible impaired driving, GI side effects (change in appetite, abdominal discomfort, nausea, vomiting, diarrhea, and/or constipation), dry mouth, somnolence, sedation, insomnia, activation, agitation, irritation, tremors, abnormal muscle movements or disorders, tardive dyskinesia, akathisia, asthenia, headache, sweating, possible bruising or rare bleeding, electrolyte and/or fluid abnormalities,  change in blood pressure/heart rate/and or heart rhythm, hypotension, sexual dysfunction, rare impulse control problems, rare seizures, rare neuroleptic malignant syndrome, increased risk of death and cerebrovascular events, change in blood glucose and increased risk for diabetes, change in triglycerides and cholesterol and increased risk for dyslipidemia,  weight gain, weight gain that can become problematic to health, skin conditions and reactions, and metabolic adversities among others. Patient and/or guardian are agreeable to call the office with any worsening of symptoms or onset of side effects, or if any concerns or questions arise.  The contact information for the office is made available to the patient and/or guardian. Patient and/or guardian are agreeable to call 911 or go to the nearest ER should they begin having any SI/HI, or if any urgent concerns arise. No medication side effects or related complaints today.    This APRN has discussed with the patient/guardian about the possibility of serotonin syndrome when certain medications are taken together, as is the case with this patient.  This APRN has provided the patient/guardian with a list of symptoms of serotonin syndrome including symptoms of autonomic instability, altered sensorium, confusion, restlessness, agitation, myoclonus, hyperreflexia, hyperthermia, diaphoresis, tremor, chills, diarrhea and cramps, ataxia, headache, migraines, seizures, and insomnia; which could lead to permanent hyperthermic brain damage, cardiovascular collapse, coma, or even death.  The patient/guardian are instructed to stop medications immediately and either contact this APRN/this office during regular office hours, or go to the emergency department/call 911, if they begin to experience any of the symptoms discussed.  The benefits and risks of the current medication regimen are discussed with the patient/guardian, and they feel that the benefits out weigh the risks.  The  patient/guardian verbalized understanding and agreement in their own words.    This APRN has discussed the benefits and risks of taking Lamictal (Lamotrigine).  The side effects of Lamictal can include a benign rash, blurred or double vision, dizziness, ataxia, sedation, headache, tremor, insomnia, poor coordination, fatigue,  nausea, vomiting, dyspepsia, rhinitis, infection, pharyngitis, asthenia, a rare but serious rash, rare multi-organ failure associated with Torres-Sander Syndrome, toxic epidermal necrolysis, drug hypersensitivity syndrome, rare blood dyscrasias, rare aseptic meningitis, rare sudden unexplained deaths in people with epilepsy, withdrawal seizures upon abrupt withdrawal, and rare activation of suicidal ideation and behavior (suicidality).  This APRN has discussed that a very slow dose titration when starting, or changing doses, of Lamictal may reduce the incidence of skin rash and other side effects.  The dosage should not be titrated upwards or increased faster than recommended due to the possibility of the discussed side effects and risk of development of a skin rash (which can become life threatening).    This APRN has also discussed that if the patient stops taking the Lamictal for 5 days or longer, it will be necessary to restart the drug with an initial dose titration, as rashes have been reported on reexposure.  If the patient/guardian and Provider decide to stop the Lamictal, the patient/guardian will follow the directions of this APRN/this office as a guided taper over about two weeks is appropriate due to the risk of relapse in bipolar disorder with those with bipolar disorder, the risk of seizures in those with epilepsy, and discontinuation symptoms upon rapid discontinuation of Lamictal.    The patient/guardian verbalizes understanding of benefits and risks as discussed, the patient/guardian feels the benefits outweigh the risks and is agreeable to continue/take Lamictal as  discussed.  The patient/guardian is advised should any side effects or rash develops they are to stop the Lamictal immediately and contact this APRN/this office or go to the emergency department immediately.  The patient/guardian verbalizes understanding and agreement with treatment plan in their own words.                        [facility] No Show Policy:  We understand unexpected circumstances arise; however, anytime you miss your appointment we are unable to provide you appropriate care.  In addition, each appointment missed could have been used to provide care for others.  We ask that you call at least 24 hours in advance to cancel or reschedule an appointment.  We would like to take this opportunity to remind you of our policy stating patients who miss THREE or more appointments without cancelling or rescheduling 24 hours in advance of the appointment may be subject to cancellation of any further visits with our clinic and recommendation to seek in-person services/visits.    Please call [phone] to reschedule your appointment. If there are reasons that make it difficult for you to keep the appointments, please call and let us know how we can help.  Please understand that medication prescribing will not continue without seeing your provider.      [facility]'s No Show Policy reviewed with patient at today's visit. Patient verbalized understanding of this policy. Discussed with patient that in the event that there are three or more no show visits, it will be recommended that they pursue in-person services/visits as noncompliance with telehealth visits indicates that patient is not an appropriate candidate for telemedicine and would likely be more appropriate for in-person services/visits. Patient verbalizes understanding and is agreeable to this.              MEDS ORDERED DURING VISIT:  New Medications Ordered This Visit   Medications   • ARIPiprazole (ABILIFY) 2  MG tablet     Sig: Take 1 tablet by mouth Daily.     Dispense:  30 tablet     Refill:  0   • busPIRone (BUSPAR) 10 MG tablet     Sig: Take 1 tablet by mouth 2 (Two) Times a Day.     Dispense:  60 tablet     Refill:  0   • lamoTRIgine (LaMICtal) 100 MG tablet     Sig: Take 1 tablet by mouth 2 (Two) Times a Day.     Dispense:  60 tablet     Refill:  0   • venlafaxine XR (EFFEXOR-XR) 150 MG 24 hr capsule     Sig: Take 1 capsule by mouth Daily.     Dispense:  30 capsule     Refill:  0       No follow-ups on file.          Functional Status: Moderate impairment     Prognosis: Fair with Ongoing Treatment             This document has been electronically signed by LENA Barajas  June 16, 2022 10:06 EDT       Some of the data in this electronic note has been brought forward from a previous encounter, any necessary changes have been made, it has been reviewed by this APRN, and it is accurate.      Part of this note may be an electronic transcription/translation of spoken language to printed text using the Dragon Dictation System.

## 2022-06-21 ENCOUNTER — TELEPHONE (OUTPATIENT)
Dept: INTERNAL MEDICINE | Facility: CLINIC | Age: 27
End: 2022-06-21

## 2022-06-21 NOTE — TELEPHONE ENCOUNTER
Caller: Genesis Chapin    Relationship: Self    Best call back number: [phone]    What form or medical record are you requesting:  MEDICAL  RECORD THAT SHOWS TREATMENT OF  BACK PAIN WITH TRAMADOL     Who is requesting this form or medical record from you:  [facility]     How would you like to receive the form or medical records (pick-up, mail, fax):   If fax, what is the fax number:    [facility]   [phone]    Timeframe paperwork needed: PATIENT HAS AN APPOINTMENT ON 6-23-22    Additional notes:  PATIENT IS GOING TO  FOR A  BREAST REDUCTION AND THEY NEED THE RECORD THAT SHE HAS BEEN TREATED FOR BACK PAIN WITH TRAMADOL

## 2022-06-22 ENCOUNTER — TELEMEDICINE (OUTPATIENT)
Dept: PSYCHIATRY | Facility: CLINIC | Age: 27
End: 2022-06-22

## 2022-06-22 DIAGNOSIS — F43.10 POST TRAUMATIC STRESS DISORDER (PTSD): Primary | ICD-10-CM

## 2022-06-22 PROCEDURE — 90837 PSYTX W PT 60 MINUTES: CPT | Performed by: COUNSELOR

## 2022-06-22 NOTE — PROGRESS NOTES
Date: June 22, 2022  Time In: 9:33 am   Time Out: 10:28 am   This provider is located at the [facility] [address], [place] [address] using a secure [employer] Video Visit through [employer]. Patient is being seen remotely via telehealth at home address in [place] and stated they are in a secure environment for this session. The patient's condition being diagnosed/treated is appropriate for telemedicine. The provider identified herself as well as her credentials. The patient, and/or patients guardian, consent to be seen remotely, and when consent is given they understand that the consent allows for patient identifiable information to be sent to a third party as needed. They may refuse to be seen remotely at any time. The electronic data is encrypted and password protected, and the patient and/or guardian has been advised of the potential risks to privacy not withstanding such measures.     You have chosen to receive care through a telehealth visit.  Do you consent to use a video/audio connection for your medical care today? Yes    PROGRESS NOTE  Data:  Genesis Chapin is a 27 y.o. female who presents today for follow up. Conducted brief check in with patient to identify any changes since last contact. Inquired with patient regarding her present anxiety. Patient expressed that she has anxiety and it stems related to her kids. Patient stated that she is heightened especially with her children that have autism. Patient expressed that she has lots of thoughts which race. Patient identified that she goes through the what ifs. Patient discussed past relationships and how this impacts her and her children. Patient identified that her son has an upcoming birthday and birthday party. Patient identified that she feels that she is the barricade in the relationship between her son and his father.    Patient expressed that she knows she is a good mom but that she  has thoughts of not being enough. Patient and provider processed through challenging the thoughts and being able to reframe them.     Clinical Maneuvering/Intervention: CBT    (Scales based on 0 - 10 with 10 being the worst)  Depression:   Anxiety:  6        Assisted patient in processing above session content; acknowledged and normalized patient’s thoughts, feelings, and concerns.  Rationalized patient thought process regarding increased anxiety related to her children and making parenting choices.  Discussed triggers associated with patient's anxiety.  Also discussed coping skills for patient to implement such as making plans and preparing to assist with decreasing anxiety.    Continued to utilize person centered rapport building techniques with patient in order to strength therapeutic alliance. Allowed patient to freely discuss issues without interruption or judgment. Provided safe, confidential environment to facilitate the development of positive therapeutic relationship and encourage open, honest communication. Patient identified that she is still the process of seeking breast reduction surgery and updated therapist on her progress. Patient shared and processed her  Assisted patient in identifying risk factors which would indicate the need for higher level of care including thoughts to harm self or others and/or self-harming behavior and encouraged patient to contact this office, call 911, or present to the nearest emergency room should any of these events occur. Discussed crisis intervention services and means to access. Patient adamantly and convincingly denies current suicidal or homicidal ideation or perceptual disturbance.Provider and patient discussed present plan for upcoming birthday party which is a stressor for patients anxiety.    Assessment:   Assessment   Patient appears to maintain relative stability as compared to their baseline.  However, patient continues to struggle with anxiety and trauma  history which continues to cause impairment in important areas of functioning.  A result, they can be reasonably expected to continue to benefit from treatment and would likely be at increased risk for decompensation otherwise.    Mental Status Exam:   Hygiene:   good  Cooperation:  Cooperative  Eye Contact:  Good  Psychomotor Behavior:  Appropriate  Affect:  Full range  Mood: normal  Speech:  Normal  Thought Process:  Goal directed  Thought Content:  Normal  Suicidal:  None  Homicidal:  None  Hallucinations:  None  Delusion:  None  Memory:  Intact  Orientation:  Person, Place, Time and Situation  Reliability:  good  Insight:  Good  Judgement:  Good  Impulse Control:  Good  Physical/Medical Issues:  No        Patient's Support Network Includes:   and children    Functional Status: Moderate impairment     Progress toward goal: Not at goal    Prognosis: Good with Ongoing Treatment          Plan:  Patient will continue in individual outpatient therapy with focus on improved functioning and coping skills, maintaining stability, and avoiding decompensation and the need for higher level of care.    Patient will adhere to medication regimen as prescribed and report any side effects. Patient will contact this office, call 911 or present to the nearest emergency room should suicidal or homicidal ideations occur. Provide Cognitive Behavioral Therapy and Solution Focused Therapy to improve functioning, maintain stability, and avoid decompensation and the need for higher level of care.     Return in about 1-2 weeks, or earlier if symptoms worsen or fail to improve.           VISIT DIAGNOSIS:     ICD-10-CM ICD-9-CM   1. Post traumatic stress disorder (PTSD)  F43.10 309.81             This document has been electronically signed by CARIDAD Rodriguez  June 22, 2022 13:41 EDT      Part of this note may be an electronic transcription/translation of spoken language to printed text using the Dragon Dictation System.

## 2022-06-28 ENCOUNTER — TELEPHONE (OUTPATIENT)
Dept: PSYCHIATRY | Facility: CLINIC | Age: 27
End: 2022-06-28

## 2022-06-28 NOTE — TELEPHONE ENCOUNTER
Patient called to make you aware that her PCP will be taking her medications over and she will no longer need to see you.

## 2022-07-11 DIAGNOSIS — F41.9 ANXIETY DISORDER, UNSPECIFIED TYPE: Chronic | ICD-10-CM

## 2022-07-11 DIAGNOSIS — F39 UNSPECIFIED MOOD (AFFECTIVE) DISORDER: Chronic | ICD-10-CM

## 2022-07-11 DIAGNOSIS — F41.0 PANIC DISORDER (EPISODIC PAROXYSMAL ANXIETY): Chronic | ICD-10-CM

## 2022-07-11 RX ORDER — BUSPIRONE HYDROCHLORIDE 10 MG/1
10 TABLET ORAL 2 TIMES DAILY
Qty: 60 TABLET | Refills: 0 | OUTPATIENT
Start: 2022-07-11

## 2022-07-22 DIAGNOSIS — F39 UNSPECIFIED MOOD (AFFECTIVE) DISORDER: Chronic | ICD-10-CM

## 2022-07-22 DIAGNOSIS — R45.4 IRRITABILITY: ICD-10-CM

## 2022-07-22 DIAGNOSIS — R45.86 MOOD SWINGS: ICD-10-CM

## 2022-07-22 DIAGNOSIS — F41.9 ANXIETY DISORDER, UNSPECIFIED TYPE: Chronic | ICD-10-CM

## 2022-07-24 RX ORDER — ARIPIPRAZOLE 2 MG/1
2 TABLET ORAL DAILY
Qty: 30 TABLET | Refills: 0 | OUTPATIENT
Start: 2022-07-24

## 2022-07-25 DIAGNOSIS — F41.9 ANXIETY DISORDER, UNSPECIFIED TYPE: Chronic | ICD-10-CM

## 2022-07-25 DIAGNOSIS — F39 UNSPECIFIED MOOD (AFFECTIVE) DISORDER: Chronic | ICD-10-CM

## 2022-07-25 DIAGNOSIS — F41.0 PANIC DISORDER (EPISODIC PAROXYSMAL ANXIETY): Chronic | ICD-10-CM

## 2022-07-25 RX ORDER — BUSPIRONE HYDROCHLORIDE 10 MG/1
10 TABLET ORAL 2 TIMES DAILY
Qty: 60 TABLET | Refills: 0 | OUTPATIENT
Start: 2022-07-25

## 2022-08-01 ENCOUNTER — TELEMEDICINE (OUTPATIENT)
Dept: PSYCHIATRY | Facility: CLINIC | Age: 27
End: 2022-08-01

## 2022-08-01 DIAGNOSIS — F41.1 GENERALIZED ANXIETY DISORDER: Primary | ICD-10-CM

## 2022-08-01 DIAGNOSIS — F43.10 POST TRAUMATIC STRESS DISORDER (PTSD): ICD-10-CM

## 2022-08-01 PROCEDURE — 90837 PSYTX W PT 60 MINUTES: CPT | Performed by: COUNSELOR

## 2022-08-01 NOTE — PROGRESS NOTES
Date: August 1, 2022  Time In: 8:45 am   Time Out: 9:43 am  This provider is located at the [facility] ([facility]), [address], [place] [address] using a secure [employer] Video Visit through [employer]. Patient is being seen remotely via telehealth at home address in [place] and stated they are in a secure environment for this session. The patient's condition being diagnosed/treated is appropriate for telemedicine. The provider identified herself as well as her credentials. The patient, and/or patients guardian, consent to be seen remotely, and when consent is given they understand that the consent allows for patient identifiable information to be sent to a third party as needed. They may refuse to be seen remotely at any time. The electronic data is encrypted and password protected, and the patient and/or guardian has been advised of the potential risks to privacy not withstanding such measures.     You have chosen to receive care through a telehealth visit.  Do you consent to use a video/audio connection for your medical care today? Yes    PROGRESS NOTE  Data:  Genesis Chapin is a 27 y.o. female who presents today for follow up.  Patient conducted a check in with patient at the beginning of contact. Patient stated that she had been feeling overwhelmed but that things were getting better. Patient reported that she had been having panic attacks but patient reported none since stopping one of her medications. Patient expressed that she was prescribed Adderall for ADHD which impacted her blood pressure and overall functioning negatively. Patient described all the symptoms that she had while on the Adderall. Patient expressed trusting relationships with providers and discussed plans to address medications further. Patient processed with provider past BERMAN history and the reasons for becoming clean and motivation for staying so. Patient also dicussed change in  interaction with her sons father and identified concerns that she has. Provider assisted patient by facilitating processing of rational for the change and how this is communicated with her son.   .         Clinical Maneuvering/Intervention: solution focused     (Scales based on 0 - 10 with 10 being the worst)  Depression:   Not scaled during contact Anxiety:  Patient expressed that it has been a little high       Assisted patient in processing above session content; acknowledged and normalized patient’s thoughts, feelings, and concerns.  Rationalized patient thought process regarding having ruminating thoughts and presenting with overthinking at times. Processing having panic attacks with children in order to help facilitate understanding Discussed triggers associated with patient's anxiety.  Also discussed coping skills for patient to implement such as drinking water or deep breathing.     Allowed patient to freely discuss issues without interruption or judgment. Provided safe, confidential environment to facilitate the development of positive therapeutic relationship and encourage open, honest communication. Provider utilized person centered rapport building with patient during contact.  Assisted patient in identifying risk factors which would indicate the need for higher level of care including thoughts to harm self or others and/or self-harming behavior and encouraged patient to contact this office, call 911, or present to the nearest emergency room should any of these events occur. Discussed crisis intervention services and means to access. Patient adamantly and convincingly denies current suicidal or homicidal ideation or perceptual disturbance.    Assessment:   Assessment   Patient appears to maintain relative stability as compared to their baseline.  However, patient continues to struggle with anxiety which continues to cause impairment in important areas of functioning.  A result, they can be reasonably  expected to continue to benefit from treatment and would likely be at increased risk for decompensation otherwise.    Mental Status Exam:   Hygiene:   good  Cooperation:  Cooperative  Eye Contact:  Good  Psychomotor Behavior:  Appropriate  Affect:  Full range  Mood: normal  Speech:  Normal  Thought Process:  Goal directed  Thought Content:  Normal  Suicidal:  None  Not presently; Patient stated that she had a thought since last contact when overwhelmed. Patient denied having any details (intent or plan).   Homicidal:  None  Hallucinations:  None  Not presently; Patient stated that there were some when she had been up for 2 days and that she was seeing shadows.   Delusion:  None  Memory:  Intact  Orientation:  Person, Place, Time and Situation  Reliability:  good  Insight:  Good  Judgement:  Fair  Impulse Control:  Good  Physical/Medical Issues:  No        Patient's Support Network Includes:   and children    Functional Status: Moderate impairment     Progress toward goal: Not at goal    Prognosis: Good with Ongoing Treatment          Plan:  Patient will continue in individual outpatient therapy with focus on improved functioning and coping skills, maintaining stability, and avoiding decompensation and the need for higher level of care.    Patient will adhere to medication regimen as prescribed and report any side effects. Patient will contact this office, call 911 or present to the nearest emergency room should suicidal or homicidal ideations occur. Provide Cognitive Behavioral Therapy and Solution Focused Therapy to improve functioning, maintain stability, and avoid decompensation and the need for higher level of care.     Return in about 2 weeks, or earlier if symptoms worsen or fail to improve.           VISIT DIAGNOSIS:     ICD-10-CM ICD-9-CM   1. Generalized anxiety disorder  F41.1 300.02   2. Post traumatic stress disorder (PTSD)  F43.10 309.81             This document has been electronically signed by  Ingrid Rivas, [facility]  August 1, 2022 11:25 EDT      Part of this note may be an electronic transcription/translation of spoken language to printed text using the Dragon Dictation System.

## 2022-08-15 DIAGNOSIS — F39 UNSPECIFIED MOOD (AFFECTIVE) DISORDER: Chronic | ICD-10-CM

## 2022-08-15 DIAGNOSIS — F41.0 PANIC DISORDER (EPISODIC PAROXYSMAL ANXIETY): Chronic | ICD-10-CM

## 2022-08-15 DIAGNOSIS — F41.9 ANXIETY DISORDER, UNSPECIFIED TYPE: Chronic | ICD-10-CM

## 2022-08-15 RX ORDER — VENLAFAXINE HYDROCHLORIDE 150 MG/1
150 CAPSULE, EXTENDED RELEASE ORAL DAILY
Qty: 30 CAPSULE | Refills: 0 | OUTPATIENT
Start: 2022-08-15

## 2022-08-16 ENCOUNTER — TELEMEDICINE (OUTPATIENT)
Dept: PSYCHIATRY | Facility: CLINIC | Age: 27
End: 2022-08-16

## 2022-08-16 DIAGNOSIS — F41.1 GENERALIZED ANXIETY DISORDER: Chronic | ICD-10-CM

## 2022-08-16 DIAGNOSIS — F41.0 PANIC DISORDER (EPISODIC PAROXYSMAL ANXIETY): Chronic | ICD-10-CM

## 2022-08-16 DIAGNOSIS — F31.10: Chronic | ICD-10-CM

## 2022-08-16 PROCEDURE — 90833 PSYTX W PT W E/M 30 MIN: CPT | Performed by: NURSE PRACTITIONER

## 2022-08-16 PROCEDURE — 99214 OFFICE O/P EST MOD 30 MIN: CPT | Performed by: NURSE PRACTITIONER

## 2022-08-16 RX ORDER — CARIPRAZINE 1.5 MG-3MG
KIT ORAL
Qty: 7 EACH | Refills: 0 | Status: SHIPPED | OUTPATIENT
Start: 2022-08-16 | End: 2022-08-24

## 2022-08-16 NOTE — PROGRESS NOTES
"This provider is located at the [facility] (through [facility]), [address], [facility], [address] using a secure Optimal[employer]hart Video Visit through [employer]. Patient is being seen remotely via telehealth at their home address in [place], and stated they are in a secure environment for this session. The patient's condition being diagnosed/treated is appropriate for telemedicine. The provider identified herself as well as her credentials. The patient, and/or patients guardian, consent to be seen remotely, and when consent is given they understand that the consent allows for patient identifiable information to be sent to a third party as needed. They may refuse to be seen remotely at any time. The electronic data is encrypted and password protected, and the patient and/or guardian has been advised of the potential risks to privacy not withstanding such measures.    You have chosen to receive care through a telehealth visit.  Do you consent to use a video/audio connection for your medical care today? Yes     Subjective   Genesis Chapin is a 27 y.o. female who is here today for medication management follow up.    Chief Complaint: Depression, anxiety, panic attacks, mood swings, irritability    History of Present Illness:  The patient presents today for follow-up visit with this APRN.  The patient recently chose to discontinue services with this APRN approximately 2 months ago as her PCP was treating her psychiatric illnesses after diagnosing her with ADHD and managing her psychotropic medications, but the patient endorses that things are not going well at all and she decided to reestablish with his APRN for management of her psychotropic medications.  The patient states \"you were right\" and explains that she did not tolerate treatment for ADHD with stimulant medications well at all.  The patient endorses that she would like to begin seeing this APRN for medication management as " "she did not feel that her PCP was thrown out of in her psychiatric evaluations and choosing appropriate treatment/medication options for the patient.  The patient endorses that she felt like she was having a lot of ADHD symptoms and PCP was willing to treat this which is why she decided to switch to her PCP for psychotropic medication management, but endorses that now she realizes that \"maybe it was just her anxiety and/or her mood causing those symptoms\".  Patient endorses that she was started on Adderall XR 10 mg daily and endorses that she noticed a severe exacerbation of anxiety and mood destabilization.  She endorses that her blood pressure was also significantly increased.  She endorses that after approximately 1 month she was increased to 15 mg daily, which she states further increased her blood pressure and severely exacerbated her anxiety and mood destabilization.  The patient endorses that she had to stop the 15 mg daily after 1 week due to the severity of symptoms she was experiencing at this dose.  The patient endorses that when she was on the Adderall she was not sleeping hardly at all.  She explains that she was only sleeping approximately 2 to 4 hours per night and endorses that she was not tired at all and did not need to sleep and describes experiencing an excessive amount of energy.  The patient further explains multiple symptoms that she is experiencing while taking the Adderall XR, which are consistent with a manic episode.  The patient endorses that the symptoms resolved shortly after discontinuing the Adderall XR.  Discussed with the patient that this APRN was continuing to evaluate and monitor for definitive diagnoses and ruling out a mood/bipolar disorder.  The patient endorses that she has been diagnosed with bipolar disorder in the past.  Discussed with the patient that this is very and has not witnessed the patient experience a manic episode thus while we were continuing to monitor and " evaluate for definitive diagnoses.  The patient endorses that she has had manic episodes in the past that were similar to the symptoms she was experiencing when taking the Adderall XR.  Discussed with the patient that due to reported past symptoms of manic episodes and recent induction of zandra with taking Adderall XR definitive diagnosis of bipolar 1 disorder can be given at this time and continue treatment today to address her mood and anxiety.  The patient verbalizes understanding and endorses that she is agreeable to this.  The patient endorses that she has been off of the Adderall XR for approximately 3 weeks now.  The patient states that she has been taking her Effexor  mg daily, Lamictal 100 mg twice daily, and BuSpar 10 mg twice daily consistently.  The patient states that she stopped taking the Abilify 2 mg daily approximately 1 month ago.  The patient states that when she began seeing her PCP who was managing psychotropic medications she forgot to disclose that she was taking this medication, so states that it was not refilled and she discontinued after running out of previous prescription from his APRN.  The patient endorses that she did not want to contact PCP to discuss starting it back so states that she just chose to discontinue the medication entirely.  The patient states that while the medication did seem to be helpful for mood stabilization and helping with sleep, she endorses that since stopping medication she notices that her appetite has been significantly improved.  The patient states that she was taking the medication at nighttime as it was causing some sedation and helping with sleep.  The patient endorses that she has been dieting over the past several months trying to lose weight and endorses that she typically does well throughout the day regarding appetite and not overeating, but states that after taking this medication at nighttime her appetite would be significantly increased and  she would eat more than she normally would.  The patient endorses that since this has been improved since stopping the medication she is not interested in starting back on the Abilify at this time, but states that as it was helpful for stabilizing her mood and regulating her sleep she would be interested in starting another medication similar to this but may be less likely to affect her appetite.  The patient endorses that she thinks the Lamictal is very helpful for stabilizing her mood and she has tolerated this medication well, so states that she would like to continue this medication without any changes/adjustments at this time.  The patient endorses that she also tolerates the BuSpar well and it has been effective for her anxiety/mood, so endorses that she would also like to continue this medication without any change/adjustments at this time.  The patient states that she would be interested in tapering off of the Effexor XR as tolerated as she feels like it has not been very effective recently for her anxiety/mood and also could be contributing to decreased libido.  The patient states that she is fearful to taper off the medication as it could potentially cause an exacerbation of symptoms but states that if she can start a new medication today to address her anxiety and mood and the symptoms to improve she would be interested in discussing potentially tapering off Effexor XR as tolerated at a later date.  The patient endorses that she has been sleeping much better since stopping the Adderall XR, but states that she has continued to have some difficulty sleeping due to her children.  The patient states that her children are having difficulty sleeping, which subsequently affects her sleep.  The patient endorses that she does occasionally have difficulty falling and staying asleep even when her children are sleeping well, but endorses that overall sleep has been significantly improved compared to when she was  taking the Adderall XR and experiencing symptoms of zandra.  The patient states that she is sleeping approximately 6 hours per night currently.  The patient denies any nightmares or bad dreams.  The patient reports that she has not been using marijuana as frequently recently.  The patient states that she stopped smoking marijuana when she was taking the Adderall, which she endorses could also be why her anxiety was increased during that time.  The patient states that since stopping the Adderall XR she has been using marijuana some again, but states that she is not using it nearly as often as she had been previously.  The patient denies any abnormal muscle movements or tics.  The patient rates her depression at a 5/10 on a 0-10 scale, with 10 being the worst.  The patient rates her anxiety at a 10/10 on a 0-10 scale, with 10 being the worst.  The patient rates hopelessness at a 3/10 on a 0-10 scale with 10 being the worst.  The patient denies suicidal ideations and homicidal ideations, and is convincing.  The patient denies any auditory hallucinations or visual hallucinations.  The patient does not endorse significant symptoms consistent with a psychotic illness.                        LMP:  The patient reports menstrual irregularities.  The patient denies any chance of pregnancy at this time.  The patient was educated that her prescribed medications can have potential risk to a developing fetus. The patient is advised to contact this APRN/this office if she becomes pregnant or plans to become pregnant.  Pt verbalizes understanding and acknowledged agreement with this plan in her own words.          Past Psychiatric Medications:  Lexapro 10 mg daily - effective for depression, but ineffective for anxiety   Klonopin 0.5 mg three times daily as needed for anxiety/panic  - effective but discontinued due to marijuana use  Hydroxyzine 25 mg three times daily as needed for anxiety/panic - ineffective  Seroquel - trialed  12.5 mg twice daily then 25 mg nightly.  Effective for irritability/mood swings, but caused sedation at both doses and unable to tolerate.   Propanolol 10 mg twice daily as needed for anxiety - caused sedation   Abilify 2 mg daily - partial efficacy for mood stabilization, but caused increased appetite  Adderall XR - induced zandra         The following portions of the patient's history were reviewed and updated as appropriate: allergies, current medications, past family history, past medical history, past social history, past surgical history and problem list.    Review of Systems   Constitutional: Positive for activity change and appetite change. Negative for fatigue and unexpected weight change.   Psychiatric/Behavioral: Positive for agitation, decreased concentration, dysphoric mood and sleep disturbance. Negative for behavioral problems, confusion, hallucinations, self-injury and suicidal ideas. The patient is nervous/anxious. The patient is not hyperactive.        Objective   Physical Exam  Constitutional:       Appearance: Normal appearance.   Neurological:      Mental Status: She is alert.   Psychiatric:         Attention and Perception: Attention and perception normal.         Mood and Affect: Mood is anxious and depressed. Affect is tearful.         Speech: Speech normal.         Behavior: Behavior is agitated. Behavior is cooperative.         Thought Content: Thought content normal. Thought content does not include homicidal or suicidal ideation. Thought content does not include homicidal or suicidal plan.         Cognition and Memory: Cognition and memory normal.         Judgment: Judgment is impulsive.       not currently breastfeeding.    The patient was seen remotely today via a MyChart Video Visit through [facility].  Unable to obtain vital signs due to nature of remote visit.  Height stated at 67 inches.  Weight stated at 250 pounds.       Allergies   Allergen Reactions   • Amoxicillin Nausea And Vomiting    • Penicillins Nausea And Vomiting       No results found for this or any previous visit (from the past 2016 hour(s)).    Current Medications:   Current Outpatient Medications   Medication Sig Dispense Refill   • busPIRone (BUSPAR) 10 MG tablet Take 1 tablet by mouth 2 (Two) Times a Day. 60 tablet 0   • Junel 1.5/30 1.5-30 MG-MCG tablet      • lamoTRIgine (LaMICtal) 100 MG tablet Take 1 tablet by mouth 2 (Two) Times a Day. 60 tablet 0   • venlafaxine XR (EFFEXOR-XR) 150 MG 24 hr capsule Take 1 capsule by mouth Daily. 30 capsule 0   • Cariprazine HCl (Vraylar) 1.5 & 3 MG capsule therapy Take 1.5 mg by mouth Daily for 1 day, THEN 3 mg Daily for 6 days. 7 each 0     No current facility-administered medications for this visit.       Mental Status Exam:   Hygiene:   good  Cooperation:  Cooperative  Eye Contact:  Good  Psychomotor Behavior:  Aggitated  Affect:  Mood congruent  Hopelessness: 3  Speech:  Normal  Thought Process:  Goal directed  Thought Content:  Normal  Suicidal:  None  Homicidal:  None  Hallucinations:  None  Delusion:  None  Memory:  Intact  Orientation:  Person, Place, Time and Situation  Reliability:  fair  Insight:  Fair  Judgement:  Fair   Impulse Control:  Fair  Physical/Medical Issues:  Yes See medical history            Assessment & Plan   Diagnoses and all orders for this visit:    1. Bipolar I disorder, most recent episode (or current) manic with anxious distress (HCC)  -     Cariprazine HCl (Vraylar) 1.5 & 3 MG capsule therapy; Take 1.5 mg by mouth Daily for 1 day, THEN 3 mg Daily for 6 days.  Dispense: 7 each; Refill: 0    2. Generalized anxiety disorder    3. Panic disorder (episodic paroxysmal anxiety)            In/Start Time: 10:59 AM EST.  Out/Stop Time: 11:15 AM EST.  16 minutes of face to face direct patient care with patient was spent for supportive psychotherapy including promoting the therapeutic alliance, strengthening self awareness and insights, strengthening coping skills,  discussing relaxation techniques, counseling the patient regarding diagnoses, utilizing cognitive behavioral therapy to assist the patient in recognizing more appropriate coping mechanisms which are proven effective in reducing the severity of frequency of symptoms and and in coordination of care.  This APRN assisted the patient in processing the patient's diagnoses including bipolar and anxiety, and also acknowledged and normalized the patient's thoughts, feelings, and concerns  The patient is also strongly urged to eat healthy well balanced foods in order to reduce further health risks, and exercise as tolerated and in guidance with the patient's PCP's recommendations. This APRN allowed the patient to freely discuss issues without interruption or judgment.  This APRN answered any questions the patient had regarding medication and treatment plan.          Visit Diagnoses:    ICD-10-CM ICD-9-CM   1. Bipolar I disorder, most recent episode (or current) manic with anxious distress (HCC)  F31.10 296.40   2. Generalized anxiety disorder  F41.1 300.02   3. Panic disorder (episodic paroxysmal anxiety)  F41.0 300.01       GOALS:  Short Term Goals: Patient will be compliant with medication, and patient will have no significant medication related side effects.  Patient will be engaged in psychotherapy as indicated.  Patient will report subjective improvement of symptoms.  Long term goals: To stabilize mood and treat/improve subjective symptoms, the patient will stay out of the hospital, the patient will be at an optimal level of functioning, and the patient will take all medications as prescribed.  The patient verbalized understanding and agreement with goals that were mutually set.      SUICIDE RISK ASSESSMENT: Unalterable demographics and a history of mental health intervention indicate this patient is in a high risk category compared to the general population. At present, the patient denies active SI/HI, intentions, or  plans at this time and agrees to seek immediate care should such thoughts develop. The patient verbalizes understanding of how to access emergency care if needed and agrees to do so. Consideration of suicide risk and protective factors such as history, current presentation, individual strengths and weaknesses, psychosocial and environmental stressors and variables, psychiatric illness and symptoms, medical conditions and pain, took place in this interview. Based on those considerations, the patient is determined: within individual baseline and presenting no imminent risk for suicide or homicide. Other recommendations: The patient does not meet the criteria for inpatient admission and is not a safety risk to self or others at today's visit. Inpatient treatment offers no significant advantages over outpatient treatment for this patient at today's visit.      SAFETY PLAN:  Patient was given ample time for questions and fully participated in treatment planning.  Patient was encouraged to call the clinic with any questions or concerns.  Patient was informed of access to emergency care. If patient were to develop any significant symptomatology, suicidal ideation, homicidal ideation, any concerns, or feel unsafe at any time they are to call the clinic and if unable to get immediate assistance should immediately call 911 or go to the nearest emergency room.  The patient is advised to remove or secure (lock away) all lethal weapons (including guns) and sharps (including razors, scissors, knives, etc.).  All medications (including any prescribed and any over the counter medications) should be stored in a safe and secured location that is not obtainable by children/adolescents.  Patient was given an opportunity and encouraged to ask questions about their medication, illness, and treatment. Patient contracted verbally for the following: If you are experiencing an emotional crisis or have thoughts of harming yourself or others,  please go to your nearest local emergency room or call 911. Will continue to re-assess medication response and side effects frequently to establish efficacy and ensure safety. Risks, any black box warnings, side effects, off label usage, and benefits of medication and treatment discussed with patient, along with potential adverse side effects of current and/or newly prescribed medication, alternative treatment options, and OTC medications.  Patient verbalized understanding of potential risks, any off label use of medication, any black box warnings, and any side effects in their own words. The patient verbalized understanding and agreed to comply with the safety plan discussed in their own words.  Patient given the number to the office. Number also available to the 24- hour suicide hotline.      TREATMENT PLAN/GOALS: Continue medications and treatment plan as indicated. Treatment and medication options discussed during today's visit. Patient acknowledged and verbally consented to continue with current treatment plan and was educated on the importance of compliance with treatment and follow-up appointments.        -Begin Vrayar 1.5 mg daily x1 day then increase to 3 mg daily for mood stabilization/bipolar  -Continue Lamictal 100 mg twice daily as adjunct for mood stabilization/bipolar  -Continue Effexor  mg daily for mood/anxiety.  The patient endorsed that she would like the trial tapering down on this medication as tolerated as she does not feel that it has been very effective recently and feels that it could be causing decreased libido.  Discussed with the patient that we will continue Effexor XR at current dose at this time as we are starting Vraylar, but discussed the patient that once tolerability and efficacy has been established with very rare we will do a trial tapering down on the Effexor XR as tolerated.  The patient verbalizes understanding and endorses that she is agreeable to this.  -Continue  Buspar 10 mg twice daily as adjunct for mood/anxiety   -Continue psychotherapy with Ingrid Rivas [facility]        MEDICATION ISSUES: Discussed medication options and treatment plan of prescribed medication, any off label use of medication, as well as the risks, benefits, any black box warnings including increased suicidality, and side effects including but not limited to potential falls, dizziness, possible impaired driving, GI side effects (change in appetite, abdominal discomfort, nausea, vomiting, diarrhea, and/or constipation), dry mouth, somnolence, sedation, insomnia, activation, agitation, irritation, tremors, abnormal muscle movements or disorders, tardive dyskinesia, akathisia, asthenia, headache, sweating, possible bruising or rare bleeding, electrolyte and/or fluid abnormalities, change in blood pressure/heart rate/and or heart rhythm, hypotension, sexual dysfunction, rare impulse control problems, rare seizures, rare neuroleptic malignant syndrome, increased risk of death and cerebrovascular events, change in blood glucose and increased risk for diabetes, change in triglycerides and cholesterol and increased risk for dyslipidemia,  weight gain, weight gain that can become problematic to health, skin conditions and reactions, and metabolic adversities among others. Patient and/or guardian are agreeable to call the office with any worsening of symptoms or onset of side effects, or if any concerns or questions arise.  The contact information for the office is made available to the patient and/or guardian. Patient and/or guardian are agreeable to call 911 or go to the nearest ER should they begin having any SI/HI, or if any urgent concerns arise. No medication side effects or related complaints today.    This APRN has discussed with the patient/guardian about the possibility of serotonin syndrome when certain medications are taken together, as is the case with this patient.  This APRN has provided the  patient/guardian with a list of symptoms of serotonin syndrome including symptoms of autonomic instability, altered sensorium, confusion, restlessness, agitation, myoclonus, hyperreflexia, hyperthermia, diaphoresis, tremor, chills, diarrhea and cramps, ataxia, headache, migraines, seizures, and insomnia; which could lead to permanent hyperthermic brain damage, cardiovascular collapse, coma, or even death.  The patient/guardian are instructed to stop medications immediately and either contact this APRN/this office during regular office hours, or go to the emergency department/call 911, if they begin to experience any of the symptoms discussed.  The benefits and risks of the current medication regimen are discussed with the patient/guardian, and they feel that the benefits out weigh the risks.  The patient/guardian verbalized understanding and agreement in their own words.    This APRN has discussed the benefits and risks of taking Lamictal (Lamotrigine).  The side effects of Lamictal can include a benign rash, blurred or double vision, dizziness, ataxia, sedation, headache, tremor, insomnia, poor coordination, fatigue,  nausea, vomiting, dyspepsia, rhinitis, infection, pharyngitis, asthenia, a rare but serious rash, rare multi-organ failure associated with Torres-Sander Syndrome, toxic epidermal necrolysis, drug hypersensitivity syndrome, rare blood dyscrasias, rare aseptic meningitis, rare sudden unexplained deaths in people with epilepsy, withdrawal seizures upon abrupt withdrawal, and rare activation of suicidal ideation and behavior (suicidality).  This APRN has discussed that a very slow dose titration when starting, or changing doses, of Lamictal may reduce the incidence of skin rash and other side effects.  The dosage should not be titrated upwards or increased faster than recommended due to the possibility of the discussed side effects and risk of development of a skin rash (which can become life  threatening).    This APRN has also discussed that if the patient stops taking the Lamictal for 5 days or longer, it will be necessary to restart the drug with an initial dose titration, as rashes have been reported on reexposure.  If the patient/guardian and Provider decide to stop the Lamictal, the patient/guardian will follow the directions of this APRN/this office as a guided taper over about two weeks is appropriate due to the risk of relapse in bipolar disorder with those with bipolar disorder, the risk of seizures in those with epilepsy, and discontinuation symptoms upon rapid discontinuation of Lamictal.    The patient/guardian verbalizes understanding of benefits and risks as discussed, the patient/guardian feels the benefits outweigh the risks and is agreeable to continue/take Lamictal as discussed.  The patient/guardian is advised should any side effects or rash develops they are to stop the Lamictal immediately and contact this APRN/this office or go to the emergency department immediately.  The patient/guardian verbalizes understanding and agreement with treatment plan in their own words.                        [facility] No Show Policy:  We understand unexpected circumstances arise; however, anytime you miss your appointment we are unable to provide you appropriate care.  In addition, each appointment missed could have been used to provide care for others.  We ask that you call at least 24 hours in advance to cancel or reschedule an appointment.  We would like to take this opportunity to remind you of our policy stating patients who miss THREE or more appointments without cancelling or rescheduling 24 hours in advance of the appointment may be subject to cancellation of any further visits with our clinic and recommendation to seek in-person services/visits.    Please call [phone] to reschedule your appointment. If there are reasons that make it difficult for you to keep the  appointments, please call and let us know how we can help.  Please understand that medication prescribing will not continue without seeing your provider.      [facility]'s No Show Policy reviewed with patient at today's visit. Patient verbalized understanding of this policy. Discussed with patient that in the event that there are three or more no show visits, it will be recommended that they pursue in-person services/visits as noncompliance with telehealth visits indicates that patient is not an appropriate candidate for telemedicine and would likely be more appropriate for in-person services/visits. Patient verbalizes understanding and is agreeable to this.              MEDS ORDERED DURING VISIT:  New Medications Ordered This Visit   Medications   • Cariprazine HCl (Vraylar) 1.5 & 3 MG capsule therapy     Sig: Take 1.5 mg by mouth Daily for 1 day, THEN 3 mg Daily for 6 days.     Dispense:  7 each     Refill:  0     Vraylar starter pack       Return in about 1 week (around 8/23/2022), or if symptoms worsen or fail to improve, for Recheck.        Patient will follow-up in 1 week, highly encouraged the patient if she had any questions or concerns to contact the behavioral health virtual clinic for sooner appointment patient verbalized understanding.      Functional Status: Severe impairment    Prognosis: Guarded with Ongoing Treatment            This document has been electronically signed by LENA Barajas  August 16, 2022 11:58 EDT       Some of the data in this electronic note has been brought forward from a previous encounter, any necessary changes have been made, it has been reviewed by this APRN, and it is accurate.      Part of this note may be an electronic transcription/translation of spoken language to printed text using the Dragon Dictation System.

## 2022-08-18 ENCOUNTER — TELEPHONE (OUTPATIENT)
Dept: PSYCHIATRY | Facility: CLINIC | Age: 27
End: 2022-08-18

## 2022-08-18 NOTE — TELEPHONE ENCOUNTER
Pt states the Vraylar is making her to tired in the day.  Pt wants to know if it is ok to start taking it at night?  Pt states she will take it at 9pm tomorrow if it is ok with the provider.    Please advise.

## 2022-08-22 ENCOUNTER — TELEMEDICINE (OUTPATIENT)
Dept: PSYCHIATRY | Facility: CLINIC | Age: 27
End: 2022-08-22

## 2022-08-22 DIAGNOSIS — F41.1 GENERALIZED ANXIETY DISORDER: Primary | ICD-10-CM

## 2022-08-22 PROCEDURE — 90832 PSYTX W PT 30 MINUTES: CPT | Performed by: COUNSELOR
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
## 2022-08-24 ENCOUNTER — TELEMEDICINE (OUTPATIENT)
Dept: PSYCHIATRY | Facility: CLINIC | Age: 27
End: 2022-08-24

## 2022-08-24 DIAGNOSIS — F41.0 PANIC DISORDER (EPISODIC PAROXYSMAL ANXIETY): Chronic | ICD-10-CM

## 2022-08-24 DIAGNOSIS — F41.1 GENERALIZED ANXIETY DISORDER: Chronic | ICD-10-CM

## 2022-08-24 DIAGNOSIS — F31.10: Chronic | ICD-10-CM

## 2022-08-24 PROCEDURE — 99214 OFFICE O/P EST MOD 30 MIN: CPT | Performed by: NURSE PRACTITIONER

## 2022-08-24 PROCEDURE — 90833 PSYTX W PT W E/M 30 MIN: CPT | Performed by: NURSE PRACTITIONER

## 2022-08-24 RX ORDER — LAMOTRIGINE 100 MG/1
100 TABLET ORAL 2 TIMES DAILY
Qty: 60 TABLET | Refills: 0 | Status: SHIPPED | OUTPATIENT
Start: 2022-08-24 | End: 2022-09-19

## 2022-08-24 RX ORDER — DESVENLAFAXINE 25 MG/1
25 TABLET, EXTENDED RELEASE ORAL DAILY
Qty: 7 TABLET | Refills: 0 | Status: SHIPPED | OUTPATIENT
Start: 2022-08-24 | End: 2022-08-29

## 2022-08-24 RX ORDER — LAMOTRIGINE 100 MG/1
100 TABLET ORAL 2 TIMES DAILY
Qty: 60 TABLET | Refills: 0 | OUTPATIENT
Start: 2022-08-24

## 2022-08-24 RX ORDER — TRAMADOL HYDROCHLORIDE 50 MG/1
TABLET ORAL
COMMUNITY
Start: 2022-08-23 | End: 2022-10-12

## 2022-08-24 RX ORDER — BUSPIRONE HYDROCHLORIDE 10 MG/1
10 TABLET ORAL 2 TIMES DAILY
Qty: 60 TABLET | Refills: 0 | Status: SHIPPED | OUTPATIENT
Start: 2022-08-24 | End: 2022-09-21 | Stop reason: SDUPTHER

## 2022-08-24 RX ORDER — VENLAFAXINE HYDROCHLORIDE 75 MG/1
75 CAPSULE, EXTENDED RELEASE ORAL DAILY
Qty: 7 CAPSULE | Refills: 0 | Status: SHIPPED | OUTPATIENT
Start: 2022-08-24 | End: 2022-08-29 | Stop reason: SDUPTHER

## 2022-08-24 NOTE — PROGRESS NOTES
"This provider is located at the [facility] (through [facility]), [address], [facility], [address] using a secure [employer] Video Visit through [employer]. Patient is being seen remotely via telehealth at their home address in [place], and stated they are in a secure environment for this session. The patient's condition being diagnosed/treated is appropriate for telemedicine. The provider identified herself as well as her credentials. The patient, and/or patients guardian, consent to be seen remotely, and when consent is given they understand that the consent allows for patient identifiable information to be sent to a third party as needed. They may refuse to be seen remotely at any time. The electronic data is encrypted and password protected, and the patient and/or guardian has been advised of the potential risks to privacy not withstanding such measures.    You have chosen to receive care through a telehealth visit.  Do you consent to use a video/audio connection for your medical care today? Yes     Subjective   Genesis Chapin is a 27 y.o. female who is here today for medication management follow up.    Chief Complaint: Bipolar, anxiety, panic attacks    History of Present Illness:  The patient describes her mood as \"better\" over the last week.  The patient endorses that she has been feeling a lot better over the past week.  The patient states that her mood has been \"pretty decent\".  The patient states that mood has been much more stable and denies any recent mood fluctuations.  The patient denies any recent symptoms of hypomania/zandra.  The patient states that although her mood has been better she does not like the Vraylar and endorses that she would like to discontinue this medication.  The patient states that her appetite has been somewhat increased since beginning this medication and she is gained approximately 3 pounds over the past week.  The patient states that " she also has been having a lot of sedation since beginning this medication.  The patient states that she switched to taking it at nighttime due to the sedation, but states that she still feeling really tired and sluggish in the mornings upon awakening.  The patient endorses that she has a significant lack of motivation and energy.  The patient endorses that the lack of motivation and energy have been going on for quite some time now, but states that it has been worsened since beginning the Vraylar.  The patient endorses that since her mood has been stable and she has a history of side effects with antipsychotic medications being used for mood stabilization, rather than replacing the Vraylar with another antipsychotic/mood stabilizer she would would prefer to begin cross tapering off of the Effexor XR and start a new medication that may be more effective for managing her anxiety/mood and less likely to cause side effects such as decreased libido that she is experiencing with the Effexor XR.  Discussed with the patient that since mood has been stable and she has been taking her Lamictal consistently we will begin cross taper with the Effexor XR and Pristiq at this time.  Discussed with the patient that we will closely monitor mood stability and discuss potential medication options/treatments to address mood stability if mood becomes unstable during cross-taper.  The patient verbalizes understanding and endorses that she is agreeable to this.  The patient denies any new medical problems or changes in medications since last appointment with this facility.  The patient reports compliance with current medication regimen.  The patient reports that she does have muscle spasms, but endorses that this is likely related to history of back injury/back pain.  The patient denies any recent onset of abnormal muscle movements or tics since beginning the Vraylar.  The patient endorses that she has been sleeping better recently.  The  "patient estimates that she is sleeping approximately 6 to 7 hours per night.  The patient denies any difficulties falling asleep, but she does report some difficulty staying asleep.  The patient endorses that she has been having some \"weird dreams\" recently.  The patient states that they are not really nightmares or bad dreams, but states that she has been having dreams about her ex recently.  The patient endorses that the dreams are not distressing for her and \"not cause her to wake up in a panic attack\".  The patient states that she has a tendency to dream about stuff going on in her life and she has been worried about her ex using again, so states that she feels that this is why she is having dreams about him currently.  The patient rates her depression at a 3/10 on a 0-10 scale, with 10 being the worst.  The patient rates her anxiety at a 7/10 on a 0-10 scale, with 10 being the worst.  The patient rates hopelessness at a 0/10 on a 0-10 scale with 10 being the worst.  The patient denies suicidal ideations and homicidal ideations, and is convincing.  The patient denies any auditory hallucinations or visual hallucinations.  The patient does not endorse significant symptoms consistent with a psychotic illness.              LMP:  The patient reports menstrual irregularities.  The patient denies any chance of pregnancy at this time.  The patient was educated that her prescribed medications can have potential risk to a developing fetus. The patient is advised to contact this APRN/this office if she becomes pregnant or plans to become pregnant.  Pt verbalizes understanding and acknowledged agreement with this plan in her own words.          Past Psychiatric Medications:  Lexapro 10 mg daily - effective for depression, but ineffective for anxiety   Klonopin 0.5 mg three times daily as needed for anxiety/panic  - effective but discontinued due to marijuana use  Hydroxyzine 25 mg three times daily as needed for " anxiety/panic - ineffective  Seroquel - trialed 12.5 mg twice daily then 25 mg nightly.  Effective for irritability/mood swings, but caused sedation at both doses and unable to tolerate.   Propanolol 10 mg twice daily as needed for anxiety - caused sedation   Abilify 2 mg daily - partial efficacy for mood stabilization, but caused increased appetite  Adderall XR - induced zandra   Vraylar 3 mg daily - caused side effects (increase appetite/weight gain and sedation)  Effexor  mg daily - partial efficacy for depression/anxiety, but caused decreased libido        The following portions of the patient's history were reviewed and updated as appropriate: allergies, current medications, past family history, past medical history, past social history, past surgical history and problem list.    Review of Systems   Constitutional: Positive for activity change, appetite change, fatigue and unexpected weight change.   Psychiatric/Behavioral: Positive for decreased concentration, dysphoric mood and sleep disturbance. Negative for agitation, behavioral problems, confusion, hallucinations, self-injury and suicidal ideas. The patient is nervous/anxious. The patient is not hyperactive.        Objective   Physical Exam  Constitutional:       Appearance: Normal appearance.   Neurological:      Mental Status: She is alert.   Psychiatric:         Attention and Perception: Attention and perception normal.         Mood and Affect: Affect normal. Mood is anxious and depressed.         Speech: Speech normal.         Behavior: Behavior normal. Behavior is cooperative.         Thought Content: Thought content normal. Thought content does not include homicidal or suicidal ideation. Thought content does not include homicidal or suicidal plan.         Cognition and Memory: Cognition and memory normal.         Judgment: Judgment is impulsive.       not currently breastfeeding.    The patient was seen [facility] today [facility]  through [employer].  Unable to obtain vital signs due to nature of remote visit.  Height stated at 67 inches.  Weight stated at 250 pounds.       Allergies   Allergen Reactions   • Amoxicillin Nausea And Vomiting   • Penicillins Nausea And Vomiting       No results found for this or any previous visit (from the past 2016 hour(s)).    Current Medications:   Current Outpatient Medications   Medication Sig Dispense Refill   • busPIRone (BUSPAR) 10 MG tablet Take 1 tablet by mouth 2 (Two) Times a Day. 60 tablet 0   • Junel 1.5/30 1.5-30 MG-MCG tablet      • lamoTRIgine (LaMICtal) 100 MG tablet Take 1 tablet by mouth 2 (Two) Times a Day. 60 tablet 0   • traMADol (ULTRAM) 50 MG tablet      • venlafaxine XR (EFFEXOR-XR) 75 MG 24 hr capsule Take 1 capsule by mouth Daily. 7 capsule 0   • Desvenlafaxine Succinate ER 25 MG tablet sustained-release 24 hour Take 1 tablet by mouth Daily. 7 tablet 0     No current facility-administered medications for this visit.       Mental Status Exam:   Hygiene:   good  Cooperation:  Cooperative  Eye Contact:  Good  Psychomotor Behavior:  Appropriate  Affect:  Full range  Hopelessness: Denies  Speech:  Normal  Thought Process:  Goal directed  Thought Content:  Normal  Suicidal:  None  Homicidal:  None  Hallucinations:  None  Delusion:  None  Memory:  Intact  Orientation:  Person, Place, Time and Situation  Reliability:  fair  Insight:  Fair  Judgement:  Fair   Impulse Control:  Fair  Physical/Medical Issues:  Yes See medical history            Assessment & Plan   Diagnoses and all orders for this visit:    1. Bipolar I disorder, most recent episode (or current) manic with anxious distress (HCC)  -     venlafaxine XR (EFFEXOR-XR) 75 MG 24 hr capsule; Take 1 capsule by mouth Daily.  Dispense: 7 capsule; Refill: 0  -     lamoTRIgine (LaMICtal) 100 MG tablet; Take 1 tablet by mouth 2 (Two) Times a Day.  Dispense: 60 tablet; Refill: 0  -     busPIRone (BUSPAR) 10 MG tablet; Take 1 tablet by mouth 2 (Two)  Times a Day.  Dispense: 60 tablet; Refill: 0  -     Desvenlafaxine Succinate ER 25 MG tablet sustained-release 24 hour; Take 1 tablet by mouth Daily.  Dispense: 7 tablet; Refill: 0    2. Generalized anxiety disorder  -     venlafaxine XR (EFFEXOR-XR) 75 MG 24 hr capsule; Take 1 capsule by mouth Daily.  Dispense: 7 capsule; Refill: 0  -     busPIRone (BUSPAR) 10 MG tablet; Take 1 tablet by mouth 2 (Two) Times a Day.  Dispense: 60 tablet; Refill: 0  -     Desvenlafaxine Succinate ER 25 MG tablet sustained-release 24 hour; Take 1 tablet by mouth Daily.  Dispense: 7 tablet; Refill: 0    3. Panic disorder (episodic paroxysmal anxiety)  -     venlafaxine XR (EFFEXOR-XR) 75 MG 24 hr capsule; Take 1 capsule by mouth Daily.  Dispense: 7 capsule; Refill: 0  -     busPIRone (BUSPAR) 10 MG tablet; Take 1 tablet by mouth 2 (Two) Times a Day.  Dispense: 60 tablet; Refill: 0  -     Desvenlafaxine Succinate ER 25 MG tablet sustained-release 24 hour; Take 1 tablet by mouth Daily.  Dispense: 7 tablet; Refill: 0            In/Start Time: 10:42 AM EST.  Out/Stop Time: 10:58 AM EST.  16 minutes of face to face direct patient care with patient was spent for supportive psychotherapy including promoting the therapeutic alliance, strengthening self awareness and insights, strengthening coping skills, discussing relaxation techniques, counseling the patient regarding diagnoses, utilizing cognitive behavioral therapy to assist the patient in recognizing more appropriate coping mechanisms which are proven effective in reducing the severity of frequency of symptoms and and in coordination of care.  This APRN assisted the patient in processing the patient's diagnoses including bipolar and anxiety, and also acknowledged and normalized the patient's thoughts, feelings, and concerns  The patient is also strongly urged to eat healthy well balanced foods in order to reduce further health risks, and exercise as tolerated and in guidance with the  patient's PCP's recommendations. This APRN allowed the patient to freely discuss issues without interruption or judgment.  This APRN answered any questions the patient had regarding medication and treatment plan.          Visit Diagnoses:    ICD-10-CM ICD-9-CM   1. Bipolar I disorder, most recent episode (or current) manic with anxious distress (HCC)  F31.10 296.40   2. Generalized anxiety disorder  F41.1 300.02   3. Panic disorder (episodic paroxysmal anxiety)  F41.0 300.01       GOALS:  Short Term Goals: Patient will be compliant with medication, and patient will have no significant medication related side effects.  Patient will be engaged in psychotherapy as indicated.  Patient will report subjective improvement of symptoms.  Long term goals: To stabilize mood and treat/improve subjective symptoms, the patient will stay out of the hospital, the patient will be at an optimal level of functioning, and the patient will take all medications as prescribed.  The patient verbalized understanding and agreement with goals that were mutually set.      SUICIDE RISK ASSESSMENT: Unalterable demographics and a history of mental health intervention indicate this patient is in a high risk category compared to the general population. At present, the patient denies active SI/HI, intentions, or plans at this time and agrees to seek immediate care should such thoughts develop. The patient verbalizes understanding of how to access emergency care if needed and agrees to do so. Consideration of suicide risk and protective factors such as history, current presentation, individual strengths and weaknesses, psychosocial and environmental stressors and variables, psychiatric illness and symptoms, medical conditions and pain, took place in this interview. Based on those considerations, the patient is determined: within individual baseline and presenting no imminent risk for suicide or homicide. Other recommendations: The patient does not  meet the criteria for inpatient admission and is not a safety risk to self or others at today's visit. Inpatient treatment offers no significant advantages over outpatient treatment for this patient at today's visit.      SAFETY PLAN:  Patient was given ample time for questions and fully participated in treatment planning.  Patient was encouraged to call the clinic with any questions or concerns.  Patient was informed of access to emergency care. If patient were to develop any significant symptomatology, suicidal ideation, homicidal ideation, any concerns, or feel unsafe at any time they are to call the clinic and if unable to get immediate assistance should immediately call 911 or go to the nearest emergency room.  The patient is advised to remove or secure (lock away) all lethal weapons (including guns) and sharps (including razors, scissors, knives, etc.).  All medications (including any prescribed and any over the counter medications) should be stored in a safe and secured location that is not obtainable by children/adolescents.  Patient was given an opportunity and encouraged to ask questions about their medication, illness, and treatment. Patient contracted verbally for the following: If you are experiencing an emotional crisis or have thoughts of harming yourself or others, please go to your nearest local emergency room or call 911. Will continue to re-assess medication response and side effects frequently to establish efficacy and ensure safety. Risks, any black box warnings, side effects, off label usage, and benefits of medication and treatment discussed with patient, along with potential adverse side effects of current and/or newly prescribed medication, alternative treatment options, and OTC medications.  Patient verbalized understanding of potential risks, any off label use of medication, any black box warnings, and any side effects in their own words. The patient verbalized understanding and agreed to  comply with the safety plan discussed in their own words.  Patient given the number to the office. Number also available to the 24- hour suicide hotline.      TREATMENT PLAN/GOALS: Continue medications and treatment plan as indicated. Treatment and medication options discussed during today's visit. Patient acknowledged and verbally consented to continue with current treatment plan and was educated on the importance of compliance with treatment and follow-up appointments.        -Discontinue Vraylar 3 mg daily due to side effects (increase appetite/weight gain and sedation)  -Decrease Effexor XR to 75 mg daily.  Tapering off of this medication as tolerated due to partial efficacy and side effects (decreased libido).  Beginning cross-taper with Pristiq at this time as tolerated.    -Begin Pristiq 25 mg daily for mood/anxiety   -Continue Lamictal 100 mg twice daily as adjunct for mood stabilization/bipolar  -Continue Buspar 10 mg twice daily as adjunct for mood/anxiety   -Continue psychotherapy with Ingrid Rivas [facility]        MEDICATION ISSUES: Discussed medication options and treatment plan of prescribed medication, any off label use of medication, as well as the risks, benefits, any black box warnings including increased suicidality, and side effects including but not limited to potential falls, dizziness, possible impaired driving, GI side effects (change in appetite, abdominal discomfort, nausea, vomiting, diarrhea, and/or constipation), dry mouth, somnolence, sedation, insomnia, activation, agitation, irritation, tremors, abnormal muscle movements or disorders, headache, sweating, possible bruising or rare bleeding, electrolyte and/or fluid abnormalities, change in blood pressure/heart rate/and or heart rhythm, sexual dysfunction, and metabolic adversities among others. Patient and/or guardian agreeable to call the office with any worsening of symptoms or onset of side effects, or if any concerns or questions  arise.  The contact information for the office is made available to the patient and/or guardian.  Patient and/or guardian agreeable to call 911 or go to the nearest ER should they begin having any SI/HI, or if any urgent concerns arise. No medication side effects or related complaints today.    This APRN has discussed with the patient/guardian about the possibility of serotonin syndrome when certain medications are taken together, as is the case with this patient.  This APRN has provided the patient/guardian with a list of symptoms of serotonin syndrome including symptoms of autonomic instability, altered sensorium, confusion, restlessness, agitation, myoclonus, hyperreflexia, hyperthermia, diaphoresis, tremor, chills, diarrhea and cramps, ataxia, headache, migraines, seizures, and insomnia; which could lead to permanent hyperthermic brain damage, cardiovascular collapse, coma, or even death.  The patient/guardian are instructed to stop medications immediately and either contact this APRN/this office during regular office hours, or go to the emergency department/call 911, if they begin to experience any of the symptoms discussed.  The benefits and risks of the current medication regimen are discussed with the patient/guardian, and they feel that the benefits out weigh the risks.  The patient/guardian verbalized understanding and agreement in their own words.    This APRN has discussed the benefits and risks of taking Lamictal (Lamotrigine).  The side effects of Lamictal can include a benign rash, blurred or double vision, dizziness, ataxia, sedation, headache, tremor, insomnia, poor coordination, fatigue,  nausea, vomiting, dyspepsia, rhinitis, infection, pharyngitis, asthenia, a rare but serious rash, rare multi-organ failure associated with Torres-Sander Syndrome, toxic epidermal necrolysis, drug hypersensitivity syndrome, rare blood dyscrasias, rare aseptic meningitis, rare sudden unexplained deaths in people  with epilepsy, withdrawal seizures upon abrupt withdrawal, and rare activation of suicidal ideation and behavior (suicidality).  This APRN has discussed that a very slow dose titration when starting, or changing doses, of Lamictal may reduce the incidence of skin rash and other side effects.  The dosage should not be titrated upwards or increased faster than recommended due to the possibility of the discussed side effects and risk of development of a skin rash (which can become life threatening).    This APRN has also discussed that if the patient stops taking the Lamictal for 5 days or longer, it will be necessary to restart the drug with an initial dose titration, as rashes have been reported on reexposure.  If the patient/guardian and Provider decide to stop the Lamictal, the patient/guardian will follow the directions of this APRN/this office as a guided taper over about two weeks is appropriate due to the risk of relapse in bipolar disorder with those with bipolar disorder, the risk of seizures in those with epilepsy, and discontinuation symptoms upon rapid discontinuation of Lamictal.    The patient/guardian verbalizes understanding of benefits and risks as discussed, the patient/guardian feels the benefits outweigh the risks and is agreeable to continue/take Lamictal as discussed.  The patient/guardian is advised should any side effects or rash develops they are to stop the Lamictal immediately and contact this APRN/this office or go to the emergency department immediately.  The patient/guardian verbalizes understanding and agreement with treatment plan in their own words.                        [facility] No Show Policy:  We understand unexpected circumstances arise; however, anytime you miss your appointment we are unable to provide you appropriate care.  In addition, each appointment missed could have been used to provide care for others.  We ask that you call at least 24 hours in  advance to cancel or reschedule an appointment.  We would like to take this opportunity to remind you of our policy stating patients who miss THREE or more appointments without cancelling or rescheduling 24 hours in advance of the appointment may be subject to cancellation of any further visits with our clinic and recommendation to seek in-person services/visits.    Please call [phone] to reschedule your appointment. If there are reasons that make it difficult for you to keep the appointments, please call and let us know how we can help.  Please understand that medication prescribing will not continue without seeing your provider.      [facility]'s No Show Policy reviewed with patient at today's visit. Patient verbalized understanding of this policy. Discussed with patient that in the event that there are three or more no show visits, it will be recommended that they pursue in-person services/visits as noncompliance with telehealth visits indicates that patient is not an appropriate candidate for telemedicine and would likely be more appropriate for in-person services/visits. Patient verbalizes understanding and is agreeable to this.              MEDS ORDERED DURING VISIT:  New Medications Ordered This Visit   Medications   • venlafaxine XR (EFFEXOR-XR) 75 MG 24 hr capsule     Sig: Take 1 capsule by mouth Daily.     Dispense:  7 capsule     Refill:  0   • lamoTRIgine (LaMICtal) 100 MG tablet     Sig: Take 1 tablet by mouth 2 (Two) Times a Day.     Dispense:  60 tablet     Refill:  0   • busPIRone (BUSPAR) 10 MG tablet     Sig: Take 1 tablet by mouth 2 (Two) Times a Day.     Dispense:  60 tablet     Refill:  0   • Desvenlafaxine Succinate ER 25 MG tablet sustained-release 24 hour     Sig: Take 1 tablet by mouth Daily.     Dispense:  7 tablet     Refill:  0       Return in about 1 week (around 8/31/2022), or if symptoms worsen or fail to improve, for Recheck.        Patient will follow-up  in 1 week, highly encouraged the patient if she had any questions or concerns to contact the behavioral health [facility] clinic for sooner appointment patient verbalized understanding.      Functional Status: Moderate impairment     Prognosis: Guarded with Ongoing Treatment            This document has been electronically signed by LENA Barajas  August 24, 2022 11:05 EDT       Some of the data in this electronic note has been brought forward from a previous encounter, any necessary changes have been made, it has been reviewed by this APRN, and it is accurate.      Part of this note may be an electronic transcription/translation of spoken language to printed text using the Dragon Dictation System.

## 2022-08-26 ENCOUNTER — TELEPHONE (OUTPATIENT)
Dept: PSYCHIATRY | Facility: CLINIC | Age: 27
End: 2022-08-26

## 2022-08-26 NOTE — TELEPHONE ENCOUNTER
Pt started Pristiq about 2 days ago and Pt states she is having hightened anxiety and negative thoughts and excessive tiredness. Advised pt if symptoms worsen to go to the ER.    Pt denied having any SI/HI tendencies at this time. Patient was informed if that changes to go to the ER.    Made pt aware the provider is out of office until Monday.

## 2022-08-29 ENCOUNTER — TELEMEDICINE (OUTPATIENT)
Dept: PSYCHIATRY | Facility: CLINIC | Age: 27
End: 2022-08-29

## 2022-08-29 DIAGNOSIS — F41.1 GENERALIZED ANXIETY DISORDER: Chronic | ICD-10-CM

## 2022-08-29 DIAGNOSIS — F41.0 PANIC DISORDER (EPISODIC PAROXYSMAL ANXIETY): Chronic | ICD-10-CM

## 2022-08-29 DIAGNOSIS — F31.10: Chronic | ICD-10-CM

## 2022-08-29 PROCEDURE — 99214 OFFICE O/P EST MOD 30 MIN: CPT | Performed by: NURSE PRACTITIONER

## 2022-08-29 PROCEDURE — 90833 PSYTX W PT W E/M 30 MIN: CPT | Performed by: NURSE PRACTITIONER

## 2022-08-29 RX ORDER — VENLAFAXINE HYDROCHLORIDE 75 MG/1
75 CAPSULE, EXTENDED RELEASE ORAL DAILY
Qty: 30 CAPSULE | Refills: 0 | Status: SHIPPED | OUTPATIENT
Start: 2022-08-29 | End: 2022-09-12 | Stop reason: SDUPTHER

## 2022-08-29 RX ORDER — BUPROPION HYDROCHLORIDE 150 MG/1
150 TABLET ORAL DAILY
Qty: 30 TABLET | Refills: 0 | Status: SHIPPED | OUTPATIENT
Start: 2022-08-29 | End: 2022-09-12

## 2022-08-29 NOTE — PROGRESS NOTES
This provider is located at the [facility] (through [facility]), [address], [facility] [place], [address] using a secure Secure [employer] Video Visit through [employer]. Patient is being seen remotely via telehealth at their home address in [place], and stated they are in a secure environment for this session. The patient's condition being diagnosed/treated is appropriate for telemedicine. The provider identified herself as well as her credentials. The patient, and/or patients guardian, consent to be seen remotely, and when consent is given they understand that the consent allows for patient identifiable information to be sent to a third party as needed. They may refuse to be seen remotely at any time. The electronic data is encrypted and password protected, and the patient and/or guardian has been advised of the potential risks to privacy not withstanding such measures.    You have chosen to receive care through a telehealth visit.  Do you consent to use a video/audio connection for your medical care today? Yes     Subjective   Genesis Chapin is a 27 y.o. female who is here today for urgent visit.     Chief Complaint: Bipolar, anxiety, panic attacks    History of Present Illness:  The patient reports that she was unable to tolerated the Pristiq and had to discontinue after 2 doses.  She states that it caused her to feel excessively fatigue and also increased her anxiety.  She states that she hasn't taken the medication for 2 days now and the side effects have improved.  She states that she would be interested in discussing other medication options at this time.  The patient states that she is concerned that maybe she has been having more depression than she realized.  The patient endorses that she has continued to experience a lot of fatigue, lack of motivation, and difficulty focusing/concentrating.  Discussed with the patient that we can trial beginning Wellbutrin XL with her current  psychotropic medication regimen as this medication may be effective as adjunct treatment to address her current symptoms of concern.  Discussed with the patient that we would need to monitor her mood stability as this medication could potentially destabilize mood, but discussed with the patient that will closely monitor symptoms and mood stability and make medication adjustment/changes as necessary.  The patient verbalizes understanding and endorses that she is agreeable to this.  Discussed with the patient that we will pause tapering of Effexor XR at this time as side effects she is currently experiencing may improve with beginning the Wellbutrin XL.  Discussed with the patient that if she continues to experience sexual side effects with the Effexor XR and/or depression/anxiety are not as well controlled as we would like we will then discuss potentially resuming tapering of Effexor XR and starting another medication in its place.  The patient states that she would be agreeable to this treatment plan and states that she thinks this would be a better idea rather than continue to taper off of the Effexor XR at this time as the Effexor XR has been partially effective for her depression/anxiety and she was only wanting to come off the medication because of potential side effects.  The patient states that if we do eventually resume tapering off of the Effexor XR she would like to retrial Lexapro as she has taken this medication in the past and felt that it was effective for her depression and partially effective for her anxiety.  The patient denies any abnormal muscle movements or tics.  The patient denies any recent symptoms of hypomania/zandra.  The patient reports that she feels her mood has been very stable recently since being off of the Adderall XR.  The patient denies suicidal ideations and homicidal ideations, and is convincing.  The patient denies any auditory hallucinations or visual hallucinations.  The patient  does not endorse significant symptoms consistent with a psychotic illness.              LMP:  The patient reports menstrual irregularities.  The patient denies any chance of pregnancy at this time.  The patient was educated that her prescribed medications can have potential risk to a developing fetus. The patient is advised to contact this APRN/this office if she becomes pregnant or plans to become pregnant.  Pt verbalizes understanding and acknowledged agreement with this plan in her own words.          Past Psychiatric Medications:  Lexapro 10 mg daily - effective for depression, but ineffective for anxiety   Klonopin 0.5 mg three times daily as needed for anxiety/panic  - effective but discontinued due to marijuana use  Hydroxyzine 25 mg three times daily as needed for anxiety/panic - ineffective  Seroquel - trialed 12.5 mg twice daily then 25 mg nightly.  Effective for irritability/mood swings, but caused sedation at both doses and unable to tolerate.   Propanolol 10 mg twice daily as needed for anxiety - caused sedation   Abilify 2 mg daily - partial efficacy for mood stabilization, but caused increased appetite  Adderall XR - induced zandra   Vraylar 3 mg daily - caused side effects (increase appetite/weight gain and sedation)  Effexor  mg daily - partial efficacy for depression/anxiety, but caused decreased libido  Pristiq 25 mg daily - caused excessive fatigue and increased anxiety        The following portions of the patient's history were reviewed and updated as appropriate: allergies, current medications, past family history, past medical history, past social history, past surgical history and problem list.    Review of Systems   Constitutional: Positive for fatigue. Negative for activity change, appetite change and unexpected weight change.   Psychiatric/Behavioral: Positive for decreased concentration and dysphoric mood. Negative for agitation, behavioral problems, confusion, hallucinations,  self-injury, sleep disturbance and suicidal ideas. The patient is nervous/anxious. The patient is not hyperactive.        Objective   Physical Exam  Constitutional:       Appearance: Normal appearance.   Neurological:      Mental Status: She is alert.   Psychiatric:         Attention and Perception: Attention and perception normal.         Mood and Affect: Affect normal. Mood is anxious and depressed.         Speech: Speech normal.         Behavior: Behavior normal. Behavior is cooperative.         Thought Content: Thought content normal. Thought content does not include homicidal or suicidal ideation. Thought content does not include homicidal or suicidal plan.         Cognition and Memory: Cognition and memory normal.         Judgment: Judgment is impulsive.       not currently breastfeeding.    The patient was seen remotely today via a MyChart Video Visit through [facility].  Unable to obtain vital signs due to nature of remote visit.  Height stated at 67 inches.  Weight stated at 250 pounds.       Allergies   Allergen Reactions   • Amoxicillin Nausea And Vomiting   • Penicillins Nausea And Vomiting       No results found for this or any previous visit (from the past 2016 hour(s)).    Current Medications:   Current Outpatient Medications   Medication Sig Dispense Refill   • busPIRone (BUSPAR) 10 MG tablet Take 1 tablet by mouth 2 (Two) Times a Day. 60 tablet 0   • Junel 1.5/30 1.5-30 MG-MCG tablet      • lamoTRIgine (LaMICtal) 100 MG tablet Take 1 tablet by mouth 2 (Two) Times a Day. 60 tablet 0   • traMADol (ULTRAM) 50 MG tablet      • venlafaxine XR (EFFEXOR-XR) 75 MG 24 hr capsule Take 1 capsule by mouth Daily. 30 capsule 0   • buPROPion XL (WELLBUTRIN XL) 150 MG 24 hr tablet Take 1 tablet by mouth Daily. 30 tablet 0     No current facility-administered medications for this visit.       Mental Status Exam:   Hygiene:   good  Cooperation:  Cooperative  Eye Contact:  Good  Psychomotor Behavior:  Appropriate  Affect:   Full range  Hopelessness: Denies  Speech:  Normal  Thought Process:  Goal directed  Thought Content:  Normal  Suicidal:  None  Homicidal:  None  Hallucinations:  None  Delusion:  None  Memory:  Intact  Orientation:  Person, Place, Time and Situation  Reliability:  fair  Insight:  Fair  Judgement:  Fair   Impulse Control:  Fair  Physical/Medical Issues:  Yes See medical history            Assessment & Plan   Diagnoses and all orders for this visit:    1. Bipolar I disorder, most recent episode (or current) manic with anxious distress (HCC)  -     venlafaxine XR (EFFEXOR-XR) 75 MG 24 hr capsule; Take 1 capsule by mouth Daily.  Dispense: 30 capsule; Refill: 0  -     buPROPion XL (WELLBUTRIN XL) 150 MG 24 hr tablet; Take 1 tablet by mouth Daily.  Dispense: 30 tablet; Refill: 0    2. Generalized anxiety disorder  -     venlafaxine XR (EFFEXOR-XR) 75 MG 24 hr capsule; Take 1 capsule by mouth Daily.  Dispense: 30 capsule; Refill: 0  -     buPROPion XL (WELLBUTRIN XL) 150 MG 24 hr tablet; Take 1 tablet by mouth Daily.  Dispense: 30 tablet; Refill: 0    3. Panic disorder (episodic paroxysmal anxiety)  -     venlafaxine XR (EFFEXOR-XR) 75 MG 24 hr capsule; Take 1 capsule by mouth Daily.  Dispense: 30 capsule; Refill: 0  -     buPROPion XL (WELLBUTRIN XL) 150 MG 24 hr tablet; Take 1 tablet by mouth Daily.  Dispense: 30 tablet; Refill: 0            In/Start Time: 4:14 PM EST.  Out/Stop Time: 4:30 PM EST.  16 minutes of face to face direct patient care with patient was spent for supportive psychotherapy including promoting the therapeutic alliance, strengthening self awareness and insights, strengthening coping skills, discussing relaxation techniques, counseling the patient regarding diagnoses, utilizing cognitive behavioral therapy to assist the patient in recognizing more appropriate coping mechanisms which are proven effective in reducing the severity of frequency of symptoms and and in coordination of care.  This APRN  assisted the patient in processing the patient's diagnoses including bipolar and anxiety, and also acknowledged and normalized the patient's thoughts, feelings, and concerns  The patient is also strongly urged to eat healthy well balanced foods in order to reduce further health risks, and exercise as tolerated and in guidance with the patient's PCP's recommendations. This APRN allowed the patient to freely discuss issues without interruption or judgment.  This APRN answered any questions the patient had regarding medication and treatment plan.          Visit Diagnoses:    ICD-10-CM ICD-9-CM   1. Bipolar I disorder, most recent episode (or current) manic with anxious distress (HCC)  F31.10 296.40   2. Generalized anxiety disorder  F41.1 300.02   3. Panic disorder (episodic paroxysmal anxiety)  F41.0 300.01       GOALS:  Short Term Goals: Patient will be compliant with medication, and patient will have no significant medication related side effects.  Patient will be engaged in psychotherapy as indicated.  Patient will report subjective improvement of symptoms.  Long term goals: To stabilize mood and treat/improve subjective symptoms, the patient will stay out of the hospital, the patient will be at an optimal level of functioning, and the patient will take all medications as prescribed.  The patient verbalized understanding and agreement with goals that were mutually set.      SUICIDE RISK ASSESSMENT: Unalterable demographics and a history of mental health intervention indicate this patient is in a high risk category compared to the general population. At present, the patient denies active SI/HI, intentions, or plans at this time and agrees to seek immediate care should such thoughts develop. The patient verbalizes understanding of how to access emergency care if needed and agrees to do so. Consideration of suicide risk and protective factors such as history, current presentation, individual strengths and weaknesses,  psychosocial and environmental stressors and variables, psychiatric illness and symptoms, medical conditions and pain, took place in this interview. Based on those considerations, the patient is determined: within individual baseline and presenting no imminent risk for suicide or homicide. Other recommendations: The patient does not meet the criteria for inpatient admission and is not a safety risk to self or others at today's visit. Inpatient treatment offers no significant advantages over outpatient treatment for this patient at today's visit.      SAFETY PLAN:  Patient was given ample time for questions and fully participated in treatment planning.  Patient was encouraged to call the clinic with any questions or concerns.  Patient was informed of access to emergency care. If patient were to develop any significant symptomatology, suicidal ideation, homicidal ideation, any concerns, or feel unsafe at any time they are to call the clinic and if unable to get immediate assistance should immediately call 911 or go to the nearest emergency room.  The patient is advised to remove or secure (lock away) all lethal weapons (including guns) and sharps (including razors, scissors, knives, etc.).  All medications (including any prescribed and any over the counter medications) should be stored in a safe and secured location that is not obtainable by children/adolescents.  Patient was given an opportunity and encouraged to ask questions about their medication, illness, and treatment. Patient contracted verbally for the following: If you are experiencing an emotional crisis or have thoughts of harming yourself or others, please go to your nearest local emergency room or call 911. Will continue to re-assess medication response and side effects frequently to establish efficacy and ensure safety. Risks, any black box warnings, side effects, off label usage, and benefits of medication and treatment discussed with patient, along  with potential adverse side effects of current and/or newly prescribed medication, alternative treatment options, and OTC medications.  Patient verbalized understanding of potential risks, any off label use of medication, any black box warnings, and any side effects in their own words. The patient verbalized understanding and agreed to comply with the safety plan discussed in their own words.  Patient given the number to the office. Number also available to the 24- hour suicide hotline.      TREATMENT PLAN/GOALS: Continue medications and treatment plan as indicated. Treatment and medication options discussed during today's visit. Patient acknowledged and verbally consented to continue with current treatment plan and was educated on the importance of compliance with treatment and follow-up appointments.        -Discontinue Pristiq 25 mg daily due to side effects (fatigue and increased anxiety).  The patient reports that she has not taken this medication in 2 days now due to side effects.   -Begin Wellbutrin  mg daily as adjunct for mood/anxiety  -Continue Effexor XR 75 mg daily for mood/anxiety  -Continue Lamictal 100 mg twice daily as adjunct for mood stabilization/bipolar  -Continue Buspar 10 mg twice daily as adjunct for mood/anxiety   -Continue psychotherapy with Ingrid Rivas [facility]        MEDICATION ISSUES: Discussed medication options and treatment plan of prescribed medication, any off label use of medication, as well as the risks, benefits, any black box warnings including increased suicidality, and side effects including but not limited to potential falls, dizziness, possible impaired driving, GI side effects (change in appetite, abdominal discomfort, nausea, vomiting, diarrhea, and/or constipation), dry mouth, somnolence, sedation, insomnia, activation, agitation, irritation, tremors, abnormal muscle movements or disorders, headache, sweating, possible bruising or rare bleeding, electrolyte and/or  fluid abnormalities, change in blood pressure/heart rate/and or heart rhythm, sexual dysfunction, and metabolic adversities among others. Patient and/or guardian agreeable to call the office with any worsening of symptoms or onset of side effects, or if any concerns or questions arise.  The contact information for the office is made available to the patient and/or guardian.  Patient and/or guardian agreeable to call 911 or go to the nearest ER should they begin having any SI/HI, or if any urgent concerns arise. No medication side effects or related complaints today.    This APRN has discussed with the patient/guardian about the possibility of serotonin syndrome when certain medications are taken together, as is the case with this patient.  This APRN has provided the patient/guardian with a list of symptoms of serotonin syndrome including symptoms of autonomic instability, altered sensorium, confusion, restlessness, agitation, myoclonus, hyperreflexia, hyperthermia, diaphoresis, tremor, chills, diarrhea and cramps, ataxia, headache, migraines, seizures, and insomnia; which could lead to permanent hyperthermic brain damage, cardiovascular collapse, coma, or even death.  The patient/guardian are instructed to stop medications immediately and either contact this APRN/this office during regular office hours, or go to the emergency department/call 911, if they begin to experience any of the symptoms discussed.  The benefits and risks of the current medication regimen are discussed with the patient/guardian, and they feel that the benefits out weigh the risks.  The patient/guardian verbalized understanding and agreement in their own words.    This APRN has discussed the benefits and risks of taking Lamictal (Lamotrigine).  The side effects of Lamictal can include a benign rash, blurred or double vision, dizziness, ataxia, sedation, headache, tremor, insomnia, poor coordination, fatigue,  nausea, vomiting, dyspepsia,  rhinitis, infection, pharyngitis, asthenia, a rare but serious rash, rare multi-organ failure associated with Torres-Sander Syndrome, toxic epidermal necrolysis, drug hypersensitivity syndrome, rare blood dyscrasias, rare aseptic meningitis, rare sudden unexplained deaths in people with epilepsy, withdrawal seizures upon abrupt withdrawal, and rare activation of suicidal ideation and behavior (suicidality).  This APRN has discussed that a very slow dose titration when starting, or changing doses, of Lamictal may reduce the incidence of skin rash and other side effects.  The dosage should not be titrated upwards or increased faster than recommended due to the possibility of the discussed side effects and risk of development of a skin rash (which can become life threatening).    This APRN has also discussed that if the patient stops taking the Lamictal for 5 days or longer, it will be necessary to restart the drug with an initial dose titration, as rashes have been reported on reexposure.  If the patient/guardian and Provider decide to stop the Lamictal, the patient/guardian will follow the directions of this APRN/this office as a guided taper over about two weeks is appropriate due to the risk of relapse in bipolar disorder with those with bipolar disorder, the risk of seizures in those with epilepsy, and discontinuation symptoms upon rapid discontinuation of Lamictal.    The patient/guardian verbalizes understanding of benefits and risks as discussed, the patient/guardian feels the benefits outweigh the risks and is agreeable to continue/take Lamictal as discussed.  The patient/guardian is advised should any side effects or rash develops they are to stop the Lamictal immediately and contact this APRN/this office or go to the emergency department immediately.  The patient/guardian verbalizes understanding and agreement with treatment plan in their own words.                        [facility]  No Show Policy:  We understand unexpected circumstances arise; however, anytime you miss your appointment we are unable to provide you appropriate care.  In addition, each appointment missed could have been used to provide care for others.  We ask that you call at least 24 hours in advance to cancel or reschedule an appointment.  We would like to take this opportunity to remind you of our policy stating patients who miss THREE or more appointments without cancelling or rescheduling 24 hours in advance of the appointment may be subject to cancellation of any further visits with our clinic and recommendation to seek in-person services/visits.    Please call [phone] to reschedule your appointment. If there are reasons that make it difficult for you to keep the appointments, please call and let us know how we can help.  Please understand that medication prescribing will not continue without seeing your provider.      [facility]'s No Show Policy reviewed with patient at today's visit. Patient verbalized understanding of this policy. Discussed with patient that in the event that there are three or more no show visits, it will be recommended that they pursue in-person services/visits as noncompliance with telehealth visits indicates that patient is not an appropriate candidate for telemedicine and would likely be more appropriate for in-person services/visits. Patient verbalizes understanding and is agreeable to this.              MEDS ORDERED DURING VISIT:  New Medications Ordered This Visit   Medications   • venlafaxine XR (EFFEXOR-XR) 75 MG 24 hr capsule     Sig: Take 1 capsule by mouth Daily.     Dispense:  30 capsule     Refill:  0   • buPROPion XL (WELLBUTRIN XL) 150 MG 24 hr tablet     Sig: Take 1 tablet by mouth Daily.     Dispense:  30 tablet     Refill:  0       Return in about 2 weeks (around 9/12/2022), or if symptoms worsen or fail to improve, for Recheck.        Patient will  follow-up in 2 weeks, highly encouraged the patient if she had any questions or concerns to contact the behavioral health [facility] clinic for sooner appointment patient verbalized understanding.      Functional Status: Moderate impairment     Prognosis: Guarded with Ongoing Treatment            This document has been electronically signed by LENA Barajas  August 29, 2022 16:40 EDT       Some of the data in this electronic note has been brought forward from a previous encounter, any necessary changes have been made, it has been reviewed by this APRN, and it is accurate.      Part of this note may be an electronic transcription/translation of spoken language to printed text using the Dragon Dictation System.

## 2022-08-30 ENCOUNTER — TELEMEDICINE (OUTPATIENT)
Dept: PSYCHIATRY | Facility: CLINIC | Age: 27
End: 2022-08-30

## 2022-08-30 DIAGNOSIS — F41.1 GENERALIZED ANXIETY DISORDER: ICD-10-CM

## 2022-08-30 DIAGNOSIS — F33.0 MILD EPISODE OF RECURRENT MAJOR DEPRESSIVE DISORDER: Primary | ICD-10-CM

## 2022-08-30 PROCEDURE — 90834 PSYTX W PT 45 MINUTES: CPT | Performed by: COUNSELOR

## 2022-08-30 NOTE — PROGRESS NOTES
Date: August 30, 2022  Time In: 8:45 am   Time Out: 9:28 am  This provider is located at the [facility] ([facility]), [address], [place] [address] using a secure [employer] Video Visit through [employer]. Patient is being seen remotely via telehealth at home address in [place] and stated they are in a secure environment for this session. The patient's condition being diagnosed/treated is appropriate for telemedicine. The provider identified herself as well as her credentials. The patient, and/or patients guardian, consent to be seen remotely, and when consent is given they understand that the consent allows for patient identifiable information to be sent to a third party as needed. They may refuse to be seen remotely at any time. The electronic data is encrypted and password protected, and the patient and/or guardian has been advised of the potential risks to privacy not withstanding such measures.     You have chosen to receive care through a telehealth visit.  Do you consent to use a video/audio connection for your medical care today? Yes    PROGRESS NOTE  Data:  Genesis Chapin is a 27 y.o. female who presents today for follow up. Provider conducted check in with patient during contact. Patient identified that things have been okay. Patient processed that she is allow her ex to see her son. Patient stated that she is making sure that things are safe and appropriate. Patient also identified that this is beneficial as it is important to her son. Patient stated that she is going to try a new medication and processed recent medication change from provider. Patient processed that she was ready to feel something and didn't realize that what was occurring was depression. Patient shared regarding her lack of motivation as well as have decreased sex drive.Patient shared that she has difficulty with sensory issues and discussed how this affects her level of intimacy.  Patient identified past situations which leads her to believe that she may be autistic. Patient identified some of the traits that she sees in herself that her children have. Patient stated that she is no longer smoking during the day and trying to change with only doing so at night and when the kids are sleep. Patient processed the reasoning behind doing so at this time.  Patient discussed that she sees the world negatively. Patient shared that she is looking at getting life insurance policies for herself and her  but that this difficult to navigate. Patient noted that this is not a topic that her  wants to talk about Patient shared that her son has concerns about her and her ex dying.     Chief Complaint: depression, anxiety         Clinical Maneuvering/Intervention: solution focused     (Scales based on 0 - 10 with 10 being the worst)  Depression:  not scaled during contact Anxiety: Not scaled during contact       Assisted patient in processing above session content; acknowledged and normalized patient’s thoughts, feelings, and concerns.  Rationalized patient thought process regarding concerns and worries for her kids including past, present and future. Patient shared most concerning is her daughter and processed future challenges that she sees which may occur.  Discussed triggers associated with patient's anxiety and depression.  Also discussed coping skills for patient to implement such as     Allowed patient to freely discuss issues without interruption or judgment. Provided safe, confidential environment to facilitate the development of positive therapeutic relationship and encourage open, honest communication. Assisted patient in identifying risk factors which would indicate the need for higher level of care including thoughts to harm self or others and/or self-harming behavior and encouraged patient to contact this office, call 911, or present to the nearest emergency room should any of these  events occur. Discussed crisis intervention services and means to access. Patient adamantly and convincingly denies current suicidal or homicidal ideation or perceptual disturbance.    Assessment:   Assessment   Patient appears to maintain relative stability as compared to their baseline.  However, patient continues to struggle with depression and anxiety which continues to cause impairment in important areas of functioning.  A result, they can be reasonably expected to continue to benefit from treatment and would likely be at increased risk for decompensation otherwise.    Mental Status Exam:   Hygiene:   good  Cooperation:  Cooperative  Eye Contact:  Good  Psychomotor Behavior:  Appropriate  Affect:  Full range  Mood: fluctates  Speech:  Normal  Thought Process:  Goal directed  Thought Content:  Normal  Suicidal:  None  Homicidal:  None  Hallucinations:  None  Delusion:  None  Memory:  Intact  Orientation:  Person, Place, Time and Situation  Reliability:  good  Insight:  Good and Fair  Judgement:  Good  Impulse Control:  Good  Physical/Medical Issues:  No        Patient's Support Network Includes:   and children    Functional Status: Moderate impairment     Progress toward goal: Not at goal    Prognosis: Good with Ongoing Treatment          Plan:  Patient will continue in individual outpatient therapy with focus on improved functioning and coping skills, maintaining stability, and avoiding decompensation and the need for higher level of care.    Patient will adhere to medication regimen as prescribed and report any side effects. Patient will contact this office, call 911 or present to the nearest emergency room should suicidal or homicidal ideations occur. Provide Cognitive Behavioral Therapy and Solution Focused Therapy to improve functioning, maintain stability, and avoid decompensation and the need for higher level of care.     Return in about 3 weeks, or earlier if symptoms worsen or fail to  improve.           VISIT DIAGNOSIS:     ICD-10-CM ICD-9-CM   1. Mild episode of recurrent major depressive disorder (HCC)  F33.0 296.31   2. Generalized anxiety disorder  F41.1 300.02             This document has been electronically signed by CARIDAD Rodriguez  August 30, 2022 13:25 EDT      Part of this note may be an electronic transcription/translation of spoken language to printed text using the Dragon Dictation System.

## 2022-09-12 ENCOUNTER — TELEMEDICINE (OUTPATIENT)
Dept: PSYCHIATRY | Facility: CLINIC | Age: 27
End: 2022-09-12

## 2022-09-12 DIAGNOSIS — F41.1 GENERALIZED ANXIETY DISORDER: Chronic | ICD-10-CM

## 2022-09-12 DIAGNOSIS — F41.0 PANIC DISORDER (EPISODIC PAROXYSMAL ANXIETY): Chronic | ICD-10-CM

## 2022-09-12 DIAGNOSIS — F31.10: Primary | Chronic | ICD-10-CM

## 2022-09-12 PROCEDURE — 90833 PSYTX W PT W E/M 30 MIN: CPT | Performed by: NURSE PRACTITIONER

## 2022-09-12 PROCEDURE — 99215 OFFICE O/P EST HI 40 MIN: CPT | Performed by: NURSE PRACTITIONER

## 2022-09-12 RX ORDER — VENLAFAXINE HYDROCHLORIDE 150 MG/1
150 CAPSULE, EXTENDED RELEASE ORAL DAILY
Qty: 30 CAPSULE | Refills: 0 | Status: SHIPPED | OUTPATIENT
Start: 2022-09-12 | End: 2022-10-12 | Stop reason: SDUPTHER

## 2022-09-12 RX ORDER — CLONIDINE HYDROCHLORIDE 0.1 MG/1
0.1 TABLET ORAL 2 TIMES DAILY PRN
Qty: 30 TABLET | Refills: 0 | Status: SHIPPED | OUTPATIENT
Start: 2022-09-12 | End: 2022-09-21

## 2022-09-12 NOTE — PROGRESS NOTES
"This provider is located at the [facility] (through [facility]), [address], [facility], [address] using a secure [employer] Video Visit through [employer]. Patient is being seen remotely via telehealth at their home address in [place], and stated they are in a secure environment for this session. The patient's condition being diagnosed/treated is appropriate for telemedicine. The provider identified herself as well as her credentials. The patient, and/or patients guardian, consent to be seen remotely, and when consent is given they understand that the consent allows for patient identifiable information to be sent to a third party as needed. They may refuse to be seen remotely at any time. The electronic data is encrypted and password protected, and the patient and/or guardian has been advised of the potential risks to privacy not withstanding such measures.    You have chosen to receive care through a telehealth visit.  Do you consent to use a video/audio connection for your medical care today? Yes     Subjective   Genesis Chapin is a 27 y.o. female who is here today for urgent visit.     Chief Complaint: Bipolar, anxiety, panic attacks    History of Present Illness:  The patient describes her mood as \"not good\" over the last couple of weeks.  The patient states that she has not been doing well over the past couple of weeks.  She states that her anxiety has been severe and her mood has been unstable.  She states that she isn't sure if it is related to decreasing the dose of her Effexor XR or starting the Wellbutrin XL, but endorses that she just wants to return to her previous psychotropic medication regimen of Effexor  mg daily, Lamictal 100 mg twice daily, and Buspar 10 mg twice daily because it was the most effective for her and symptoms were more well-managed.  She endorses that her \"thoughts have been so rapid\" and she \"just wanted to slow down\".  The patient " "states that Wellbutrin XL did help improve her motivation and fatigue, but states that shortly thereafter she began experiencing a severe exacerbation of anxiety and mood destabilization.  The patient endorses that she has been having so many panic attacks recently as her anxiety has been so severe.  The patient states that she tried taking propranolol from a previous prescription.  The patient states that it did help decrease the symptoms of her panic attack, but states that it was not entirely effective in aborting the panic attack.  The patient states that she just has so much going on in her life right now that she feels that going back to her previous psychotropic medication regimen would be the most beneficial for her at this time.  She states that she does not feel she will be able to deal with everything going on in her life if her mood/anxiety did not become more well-controlled.  The patient states that she was concerned that the Effexor XR was possibly causing side effects such as menstrual abnormalities, muscle spasms, and decreased libido which is why she requested to trial a decreased dose of this medication at last visit, but states that at this point she feels that she would rather \"deal with the side effects\" that the medication could possibly be causing because symptoms have been so severe since tapering the dose.  The patient also endorses that she feels that her stress/anxiety could also be causing these things and that obtaining better management of her stress/anxiety could help improve symptoms, so endorses that she would prefer just to resume previous dose of the Effexor XR.  Discussed with the patient that the symptoms she is experiencing currently are similar to symptoms she experienced when taking Adderall XR.  Discussed with the patient that Wellbutrin XL is not in the same class of medications as Adderall XR but does have similar pharmacodynamic properties which could be why she is having " a similar reaction with these two medications.  Discussed with patient that we will discontinue Wellbutrin XL at this time and increase Effexor XR back to previous dose of 150 mg daily.  Discussed with the patient that we will resume previous psychotropic medication regimen as she felt that this was most effective for her in controlling symptoms.  Discussed with the patient that if she continues to experience menstrual abnormalities, muscle spasms, or decreased libido and feels that they are related to the Effexor XR we will discuss tapering off of this medication and other medication options as tolerated at that time once mood/anxiety are more stable.  The patient verbalizes understanding and endorses that she is agreeable to this.  The patient reports her appetite as fair.  The patient states that her sleep has been fair recently.  The patient states that she does have some mild difficulties falling and staying asleep at times, but states that when this occurs she typically smokes marijuana and endorses that this helps.  The patient estimates that she is sleeping approximately 6 to 7 hours per night.  The patient denies any new medical problems or changes in medications since last appointment with this facility.  The patient reports compliance with current medication regimen.  The patient denies any abnormal muscle movements or tics.  The patient rates her depression at a 6/10 on a 0-10 scale, with 10 being the worst.  The patient rates her anxiety at a 10/10 on a 0-10 scale, with 10 being the worst.  The patient rates hopelessness at a 2/10 on a 0-10 scale with 10 being the worst.  The patient denies suicidal ideations and homicidal ideations, and is convincing.  The patient denies any auditory hallucinations or visual hallucinations.  The patient does not endorse significant symptoms consistent with a psychotic illness.                  LMP:  The patient reports menstrual irregularities.  The patient denies any  chance of pregnancy at this time.  The patient was educated that her prescribed medications can have potential risk to a developing fetus. The patient is advised to contact this APRN/this office if she becomes pregnant or plans to become pregnant.  Pt verbalizes understanding and acknowledged agreement with this plan in her own words.          Past Psychiatric Medications:  Lexapro 10 mg daily - effective for depression, but ineffective for anxiety   Klonopin 0.5 mg three times daily as needed for anxiety/panic  - effective but discontinued due to marijuana use  Hydroxyzine 25 mg three times daily as needed for anxiety/panic - ineffective  Seroquel - trialed 12.5 mg twice daily then 25 mg nightly.  Effective for irritability/mood swings, but caused sedation at both doses and unable to tolerate.   Propanolol 10 mg twice daily as needed for anxiety - caused sedation   Abilify 2 mg daily - partial efficacy for mood stabilization, but caused increased appetite  Adderall XR - induced zandra   Vraylar 3 mg daily - caused side effects (increase appetite/weight gain and sedation)  Effexor  mg daily - partial efficacy for depression/anxiety, but caused decreased libido  Pristiq 25 mg daily - caused excessive fatigue and increased anxiety  Wellbutrin  mg daily - effective for motivation/fatigue, but caused severe exacerbation of anxiety and destabilization of mood        The following portions of the patient's history were reviewed and updated as appropriate: allergies, current medications, past family history, past medical history, past social history, past surgical history and problem list.    Review of Systems   Constitutional: Positive for activity change. Negative for appetite change, fatigue and unexpected weight change.   Psychiatric/Behavioral: Positive for agitation, decreased concentration, dysphoric mood and sleep disturbance. Negative for behavioral problems, confusion, hallucinations, self-injury and  suicidal ideas. The patient is nervous/anxious. The patient is not hyperactive.        Objective   Physical Exam  Constitutional:       Appearance: Normal appearance.   Neurological:      Mental Status: She is alert.   Psychiatric:         Attention and Perception: Attention and perception normal.         Mood and Affect: Mood is anxious and depressed. Affect is labile and tearful.         Speech: Speech normal.         Behavior: Behavior is agitated. Behavior is cooperative.         Thought Content: Thought content normal. Thought content does not include homicidal or suicidal ideation. Thought content does not include homicidal or suicidal plan.         Cognition and Memory: Cognition and memory normal.         Judgment: Judgment is impulsive.       not currently breastfeeding.    The patient was seen remotely today via a MyChart Video Visit through [facility].  Unable to obtain vital signs due to nature of remote visit.  Height stated at 67 inches.  Weight stated at 250 pounds.       Allergies   Allergen Reactions   • Amoxicillin Nausea And Vomiting   • Penicillins Nausea And Vomiting       No results found for this or any previous visit (from the past 2016 hour(s)).    Current Medications:   Current Outpatient Medications   Medication Sig Dispense Refill   • busPIRone (BUSPAR) 10 MG tablet Take 1 tablet by mouth 2 (Two) Times a Day. 60 tablet 0   • Junel 1.5/30 1.5-30 MG-MCG tablet      • lamoTRIgine (LaMICtal) 100 MG tablet Take 1 tablet by mouth 2 (Two) Times a Day. 60 tablet 0   • traMADol (ULTRAM) 50 MG tablet      • venlafaxine XR (EFFEXOR-XR) 150 MG 24 hr capsule Take 1 capsule by mouth Daily. 30 capsule 0   • cloNIDine (CATAPRES) 0.1 MG tablet Take 1 tablet by mouth 2 (Two) Times a Day As Needed (anxiety/panic). 30 tablet 0     No current facility-administered medications for this visit.       Mental Status Exam:   Hygiene:   good  Cooperation:  Cooperative  Eye Contact:  Good  Psychomotor Behavior:   Aggitated  Affect:  Labile  Hopelessness: 2  Speech:  Rambling  Thought Process:  Goal directed  Thought Content:  Normal  Suicidal:  None  Homicidal:  None  Hallucinations:  None  Delusion:  None  Memory:  Intact  Orientation:  Person, Place, Time and Situation  Reliability:  fair  Insight:  Fair  Judgement:  Fair   Impulse Control:  Fair  Physical/Medical Issues:  Yes See medical history            Assessment & Plan   Diagnoses and all orders for this visit:    1. Bipolar I disorder, most recent episode (or current) manic with anxious distress (HCC) (Primary)  -     venlafaxine XR (EFFEXOR-XR) 150 MG 24 hr capsule; Take 1 capsule by mouth Daily.  Dispense: 30 capsule; Refill: 0    2. Generalized anxiety disorder  -     venlafaxine XR (EFFEXOR-XR) 150 MG 24 hr capsule; Take 1 capsule by mouth Daily.  Dispense: 30 capsule; Refill: 0  -     cloNIDine (CATAPRES) 0.1 MG tablet; Take 1 tablet by mouth 2 (Two) Times a Day As Needed (anxiety/panic).  Dispense: 30 tablet; Refill: 0    3. Panic disorder (episodic paroxysmal anxiety)  -     venlafaxine XR (EFFEXOR-XR) 150 MG 24 hr capsule; Take 1 capsule by mouth Daily.  Dispense: 30 capsule; Refill: 0  -     cloNIDine (CATAPRES) 0.1 MG tablet; Take 1 tablet by mouth 2 (Two) Times a Day As Needed (anxiety/panic).  Dispense: 30 tablet; Refill: 0            In/Start Time: 1:59 PM EST.  Out/Stop Time: 2:15 PM EST.  16 minutes of face to face direct patient care with patient was spent for supportive psychotherapy including promoting the therapeutic alliance, strengthening self awareness and insights, strengthening coping skills, discussing relaxation techniques, counseling the patient regarding diagnoses, utilizing cognitive behavioral therapy to assist the patient in recognizing more appropriate coping mechanisms which are proven effective in reducing the severity of frequency of symptoms and and in coordination of care.  This APRN assisted the patient in processing the  patient's diagnoses including bipolar and anxiety, and also acknowledged and normalized the patient's thoughts, feelings, and concerns  The patient is also strongly urged to eat healthy well balanced foods in order to reduce further health risks, and exercise as tolerated and in guidance with the patient's PCP's recommendations. This APRN allowed the patient to freely discuss issues without interruption or judgment.  This APRN answered any questions the patient had regarding medication and treatment plan.        Visit Time (face to face direct patient care):  In/Start Time: 1:43 PM EST.  Out/Stop: 2:15 PM EST.  32 minutes of face to face direct patient care with the patient spent in coordination of care and counseling the patient regarding diagnoses and treatment planning. Answered any questions patient had with medication and treatment plan.          Total Time spent by this APRN for today's encounter:  84 total minutes were spent by this APRN on charting/documentation, review of past medical records, direct face to face patient care, and coordination of care.         Visit Diagnoses:    ICD-10-CM ICD-9-CM   1. Bipolar I disorder, most recent episode (or current) manic with anxious distress (HCC)  F31.10 296.40   2. Generalized anxiety disorder  F41.1 300.02   3. Panic disorder (episodic paroxysmal anxiety)  F41.0 300.01       GOALS:  Short Term Goals: Patient will be compliant with medication, and patient will have no significant medication related side effects.  Patient will be engaged in psychotherapy as indicated.  Patient will report subjective improvement of symptoms.  Long term goals: To stabilize mood and treat/improve subjective symptoms, the patient will stay out of the hospital, the patient will be at an optimal level of functioning, and the patient will take all medications as prescribed.  The patient verbalized understanding and agreement with goals that were mutually set.      SUICIDE RISK ASSESSMENT:  Unalterable demographics and a history of mental health intervention indicate this patient is in a high risk category compared to the general population. At present, the patient denies active SI/HI, intentions, or plans at this time and agrees to seek immediate care should such thoughts develop. The patient verbalizes understanding of how to access emergency care if needed and agrees to do so. Consideration of suicide risk and protective factors such as history, current presentation, individual strengths and weaknesses, psychosocial and environmental stressors and variables, psychiatric illness and symptoms, medical conditions and pain, took place in this interview. Based on those considerations, the patient is determined: within individual baseline and presenting no imminent risk for suicide or homicide. Other recommendations: The patient does not meet the criteria for inpatient admission and is not a safety risk to self or others at today's visit. Inpatient treatment offers no significant advantages over outpatient treatment for this patient at today's visit.      SAFETY PLAN:  Patient was given ample time for questions and fully participated in treatment planning.  Patient was encouraged to call the clinic with any questions or concerns.  Patient was informed of access to emergency care. If patient were to develop any significant symptomatology, suicidal ideation, homicidal ideation, any concerns, or feel unsafe at any time they are to call the clinic and if unable to get immediate assistance should immediately call 911 or go to the nearest emergency room.  The patient is advised to remove or secure (lock away) all lethal weapons (including guns) and sharps (including razors, scissors, knives, etc.).  All medications (including any prescribed and any over the counter medications) should be stored in a safe and secured location that is not obtainable by children/adolescents.  Patient was given an opportunity and  encouraged to ask questions about their medication, illness, and treatment. Patient contracted verbally for the following: If you are experiencing an emotional crisis or have thoughts of harming yourself or others, please go to your nearest local emergency room or call 911. Will continue to re-assess medication response and side effects frequently to establish efficacy and ensure safety. Risks, any black box warnings, side effects, off label usage, and benefits of medication and treatment discussed with patient, along with potential adverse side effects of current and/or newly prescribed medication, alternative treatment options, and OTC medications.  Patient verbalized understanding of potential risks, any off label use of medication, any black box warnings, and any side effects in their own words. The patient verbalized understanding and agreed to comply with the safety plan discussed in their own words.  Patient given the number to the office. Number also available to the 24- hour suicide hotline.      TREATMENT PLAN/GOALS: Continue medications and treatment plan as indicated. Treatment and medication options discussed during today's visit. Patient acknowledged and verbally consented to continue with current treatment plan and was educated on the importance of compliance with treatment and follow-up appointments.        -Begin Clonidine 0.1 mg twice daily as needed for anxiety/panic  -Discontinue Wellbutrin  mg daily due to adverse effects (destabilization of mood and exacerbation of anxiety)  -Increase Effexor XR to 150 mg daily for mood/anxiety.  Patient requests to go back to previous dose of Effexor XR as her previous regimen of the Effexor  mg daily, Lamictal 100 mg twice daily, and Buspar 10 mg twice daily was effective for her.  She states that she is not concerned about the side effects she feels could be related to the Effexor XR at this time as she just prefers to go back to this regimen as  it was effective and mood/anxiety were more well-controlled.  Discussed with the patient that we will return to previous psychotropic medication regimen as symptoms were more well-controlled.  Discussed with the patient that if she continues to experience menstrual abnormalities, muscle spasms, or decreased libido and feels that they are related to the Effexor XR we will discuss tapering off of this medication and other medication options as tolerated at that time once mood/anxiety are more stable.  The patient verbalizes understanding and endorses that she is agreeable to this.   -Continue Lamictal 100 mg twice daily as adjunct for mood stabilization/bipolar  -Continue Buspar 10 mg twice daily as adjunct for mood/anxiety   -Continue psychotherapy.  The patient endorses that she likes her current therapist and that she is a very sweet, but states that she doesn't use the same modalities in therapy as her previous therapist and explains how this has been a difficult adjustment for her.  She states that she would be interested in establishing care with another therapist that uses more similar treatment modalities as her previous therapist or someone who specializes in trauma-focused therapy.  Discussed with the patient that there are other therapists in the clinic that are trained in trauma-focused treatment modalities, such as EMDR.  The patient requests that she be referred to this therapist to resume trauma-focused therapy that she had been doing with her previous therapist.  Support staff verbally instructed to schedule the patient with Chapincito Parker LCSW as he is trained in EMDR and can resume trauma-focused therapy with the patient.  Support staff verbalized understanding and will be contacting the patient regarding scheduling.          MEDICATION ISSUES: Discussed medication options and treatment plan of prescribed medication, any off label use of medication, as well as the risks, benefits, any black box  warnings including increased suicidality, and side effects including but not limited to potential falls, dizziness, possible impaired driving, GI side effects (change in appetite, abdominal discomfort, nausea, vomiting, diarrhea, and/or constipation), dry mouth, somnolence, sedation, insomnia, activation, agitation, irritation, tremors, abnormal muscle movements or disorders, headache, sweating, possible bruising or rare bleeding, electrolyte and/or fluid abnormalities, change in blood pressure/heart rate/and or heart rhythm, sexual dysfunction, and metabolic adversities among others. Patient and/or guardian agreeable to call the office with any worsening of symptoms or onset of side effects, or if any concerns or questions arise.  The contact information for the office is made available to the patient and/or guardian.  Patient and/or guardian agreeable to call 911 or go to the nearest ER should they begin having any SI/HI, or if any urgent concerns arise. No medication side effects or related complaints today.    This APRN has discussed with the patient/guardian about the possibility of serotonin syndrome when certain medications are taken together, as is the case with this patient.  This APRN has provided the patient/guardian with a list of symptoms of serotonin syndrome including symptoms of autonomic instability, altered sensorium, confusion, restlessness, agitation, myoclonus, hyperreflexia, hyperthermia, diaphoresis, tremor, chills, diarrhea and cramps, ataxia, headache, migraines, seizures, and insomnia; which could lead to permanent hyperthermic brain damage, cardiovascular collapse, coma, or even death.  The patient/guardian are instructed to stop medications immediately and either contact this APRN/this office during regular office hours, or go to the emergency department/call 911, if they begin to experience any of the symptoms discussed.  The benefits and risks of the current medication regimen are  discussed with the patient/guardian, and they feel that the benefits out weigh the risks.  The patient/guardian verbalized understanding and agreement in their own words.    This APRN has discussed the benefits and risks of taking Lamictal (Lamotrigine).  The side effects of Lamictal can include a benign rash, blurred or double vision, dizziness, ataxia, sedation, headache, tremor, insomnia, poor coordination, fatigue,  nausea, vomiting, dyspepsia, rhinitis, infection, pharyngitis, asthenia, a rare but serious rash, rare multi-organ failure associated with Torres-Sander Syndrome, toxic epidermal necrolysis, drug hypersensitivity syndrome, rare blood dyscrasias, rare aseptic meningitis, rare sudden unexplained deaths in people with epilepsy, withdrawal seizures upon abrupt withdrawal, and rare activation of suicidal ideation and behavior (suicidality).  This APRN has discussed that a very slow dose titration when starting, or changing doses, of Lamictal may reduce the incidence of skin rash and other side effects.  The dosage should not be titrated upwards or increased faster than recommended due to the possibility of the discussed side effects and risk of development of a skin rash (which can become life threatening).    This APRN has also discussed that if the patient stops taking the Lamictal for 5 days or longer, it will be necessary to restart the drug with an initial dose titration, as rashes have been reported on reexposure.  If the patient/guardian and Provider decide to stop the Lamictal, the patient/guardian will follow the directions of this APRN/this office as a guided taper over about two weeks is appropriate due to the risk of relapse in bipolar disorder with those with bipolar disorder, the risk of seizures in those with epilepsy, and discontinuation symptoms upon rapid discontinuation of Lamictal.    The patient/guardian verbalizes understanding of benefits and risks as discussed, the  patient/guardian feels the benefits outweigh the risks and is agreeable to continue/take Lamictal as discussed.  The patient/guardian is advised should any side effects or rash develops they are to stop the Lamictal immediately and contact this APRN/this office or go to the emergency department immediately.  The patient/guardian verbalizes understanding and agreement with treatment plan in their own words.                        [facility] No Show Policy:  We understand unexpected circumstances arise; however, anytime you miss your appointment we are unable to provide you appropriate care.  In addition, each appointment missed could have been used to provide care for others.  We ask that you call at least 24 hours in advance to cancel or reschedule an appointment.  We would like to take this opportunity to remind you of our policy stating patients who miss THREE or more appointments without cancelling or rescheduling 24 hours in advance of the appointment may be subject to cancellation of any further visits with our clinic and recommendation to seek in-person services/visits.    Please call [phone] to reschedule your appointment. If there are reasons that make it difficult for you to keep the appointments, please call and let us know how we can help.  Please understand that medication prescribing will not continue without seeing your provider.      [facility]'s No Show Policy reviewed with patient at today's visit. Patient verbalized understanding of this policy. Discussed with patient that in the event that there are three or more no show visits, it will be recommended that they pursue in-person services/visits as noncompliance with telehealth visits indicates that patient is not an appropriate candidate for telemedicine and would likely be more appropriate for in-person services/visits. Patient verbalizes understanding and is agreeable to this.              MEDS  ORDERED DURING VISIT:  New Medications Ordered This Visit   Medications   • venlafaxine XR (EFFEXOR-XR) 150 MG 24 hr capsule     Sig: Take 1 capsule by mouth Daily.     Dispense:  30 capsule     Refill:  0   • cloNIDine (CATAPRES) 0.1 MG tablet     Sig: Take 1 tablet by mouth 2 (Two) Times a Day As Needed (anxiety/panic).     Dispense:  30 tablet     Refill:  0       Return in about 1 week (around 9/19/2022), or if symptoms worsen or fail to improve, for Recheck.        Patient will follow-up in 1.5 weeks, highly encouraged the patient if she had any questions or concerns to contact the behavioral health virtual clinic for sooner appointment patient verbalized understanding.      Functional Status: Severe impairment    Prognosis: Guarded with Ongoing Treatment             This document has been electronically signed by LENA Barajas  September 12, 2022 15:09 EDT       Some of the data in this electronic note has been brought forward from a previous encounter, any necessary changes have been made, it has been reviewed by this APRN, and it is accurate.      Part of this note may be an electronic transcription/translation of spoken language to printed text using the Dragon Dictation System.

## 2022-09-19 DIAGNOSIS — F31.10: Chronic | ICD-10-CM

## 2022-09-19 RX ORDER — LAMOTRIGINE 100 MG/1
100 TABLET ORAL 2 TIMES DAILY
Qty: 60 TABLET | Refills: 0 | Status: SHIPPED | OUTPATIENT
Start: 2022-09-19 | End: 2022-10-12 | Stop reason: SDUPTHER

## 2022-09-21 ENCOUNTER — TELEMEDICINE (OUTPATIENT)
Dept: PSYCHIATRY | Facility: CLINIC | Age: 27
End: 2022-09-21

## 2022-09-21 DIAGNOSIS — F31.10: Primary | Chronic | ICD-10-CM

## 2022-09-21 DIAGNOSIS — F41.0 PANIC DISORDER (EPISODIC PAROXYSMAL ANXIETY): Chronic | ICD-10-CM

## 2022-09-21 DIAGNOSIS — F41.1 GENERALIZED ANXIETY DISORDER: Chronic | ICD-10-CM

## 2022-09-21 PROCEDURE — 99214 OFFICE O/P EST MOD 30 MIN: CPT | Performed by: NURSE PRACTITIONER

## 2022-09-21 PROCEDURE — 90833 PSYTX W PT W E/M 30 MIN: CPT | Performed by: NURSE PRACTITIONER

## 2022-09-21 RX ORDER — BUSPIRONE HYDROCHLORIDE 15 MG/1
15 TABLET ORAL 2 TIMES DAILY
Qty: 60 TABLET | Refills: 0 | Status: SHIPPED | OUTPATIENT
Start: 2022-09-21 | End: 2022-10-12 | Stop reason: SDUPTHER

## 2022-09-21 RX ORDER — BUPROPION HYDROCHLORIDE 75 MG/1
75 TABLET ORAL DAILY
Qty: 30 TABLET | Refills: 0 | Status: SHIPPED | OUTPATIENT
Start: 2022-09-21 | End: 2022-10-12

## 2022-09-21 RX ORDER — CLONIDINE HYDROCHLORIDE 0.1 MG/1
0.1 TABLET ORAL 2 TIMES DAILY PRN
Qty: 30 TABLET | Refills: 0
Start: 2022-09-21 | End: 2022-10-12 | Stop reason: SDUPTHER

## 2022-09-21 NOTE — PROGRESS NOTES
"This provider is located at the [facility] (through [facility]), [address], [facility], [address] using a secure [employer] Video Visit through [employer]. Patient is being seen remotely via telehealth at their home address in [place], and stated they are in a secure environment for this session. The patient's condition being diagnosed/treated is appropriate for telemedicine. The provider identified herself as well as her credentials. The patient, and/or patients guardian, consent to be seen remotely, and when consent is given they understand that the consent allows for patient identifiable information to be sent to a third party as needed. They may refuse to be seen remotely at any time. The electronic data is encrypted and password protected, and the patient and/or guardian has been advised of the potential risks to privacy not withstanding such measures.    You have chosen to receive care through a telehealth visit.  Do you consent to use a video/audio connection for your medical care today? Yes     Patient confirmed consent for today's encounter on 9/21/22.        Subjective   Genesis Chapin is a 27 y.o. female who is here today for urgent visit.     Chief Complaint: Bipolar, anxiety, panic attacks    History of Present Illness:  The patient describes her mood as \"better\" over the last week.  The patient reports that she has been doing a lot better over the past week.  She states that her mood has been much more stable and that depression/anxiety have been much more well-controlled.  The patient states that she thinks that increasing back to Effexor  mg daily has been really helpful for her.  She states that her main concern at this time is that she is still struggling with lack of motivation and interest.  She states that while her mood has been much better, these symptoms of her depression have still continued.  The patient states that this is something she " "noticed that the Wellbutrin XL helped a lot with, but she just couldn't tolerate this medication due to stabilization of mood and severe exacerbation of anxiety.  But endorses that she doesn't think that the destabilization of mood and severe exacerbation of anxiety that she experienced with starting the Wellbutrin XL was entirely due to this medication and states that she feels that this response was partly due to decreasing her dose of Effexor XR to 75 mg daily concurrently when starting the Wellbutrin XL.  The patient requests to trial a lower dose of Wellbutrin now that her mood is stable and anxiety is more well-controlled with starting back on the Effexor  mg daily.  Discussed with the patient that we can cautiously trial restarting the Wellbutrin but at a lower dose to ensure tolerability.  The patient verbalizes understanding and endorses that she is agreeable to this.  The patient reports her appetite as good.  The patient reports her sleep as good.  The patient denies any difficulties falling or staying asleep recently.  The patient endorses that she has had some \"weird dreams\" lately, but denies any nightmares or bad dreams.  The patient denies any new medical problems or changes in medications since last appointment with this facility.  The patient reports compliance with current medication regimen.  The patient denies any current side effects with current medication regimen.  She reports that the Clonidine did make her a little dizzy when she was taking it twice daily, but states that as her anxiety has become more controlled she has not needed it twice daily and is using it only as needed for anxiety/panic and it is better tolerated like this.  The patient denies any abnormal muscle movements or tics.  The patient rates her depression at a 4/10 on a 0-10 scale, with 10 being the worst.  The patient rates her anxiety at a 5/10 on a 0-10 scale, with 10 being the worst.  The patient rates " hopelessness at a 0/10 on a 0-10 scale with 10 being the worst.  The patient denies suicidal ideations and homicidal ideations, and is convincing.  The patient denies any auditory hallucinations or visual hallucinations.  The patient does not endorse significant symptoms consistent with a psychotic illness.                    LMP:  The patient reports menstrual irregularities.  The patient denies any chance of pregnancy at this time.  The patient was educated that her prescribed medications can have potential risk to a developing fetus. The patient is advised to contact this APRN/this office if she becomes pregnant or plans to become pregnant.  Pt verbalizes understanding and acknowledged agreement with this plan in her own words.          Past Psychiatric Medications:  Lexapro 10 mg daily - effective for depression, but ineffective for anxiety   Klonopin 0.5 mg three times daily as needed for anxiety/panic  - effective but discontinued due to marijuana use  Hydroxyzine 25 mg three times daily as needed for anxiety/panic - ineffective  Seroquel - trialed 12.5 mg twice daily then 25 mg nightly.  Effective for irritability/mood swings, but caused sedation at both doses and unable to tolerate.   Propanolol 10 mg twice daily as needed for anxiety - caused sedation   Abilify 2 mg daily - partial efficacy for mood stabilization, but caused increased appetite  Adderall XR - induced zandra   Vraylar 3 mg daily - caused side effects (increase appetite/weight gain and sedation)  Effexor  mg daily - partial efficacy for depression/anxiety, but caused decreased libido  Pristiq 25 mg daily - caused excessive fatigue and increased anxiety  Wellbutrin  mg daily - effective for motivation/fatigue, but caused severe exacerbation of anxiety and destabilization of mood        The following portions of the patient's history were reviewed and updated as appropriate: allergies, current medications, past family history, past  medical history, past social history, past surgical history and problem list.    Review of Systems   Constitutional: Negative for activity change, appetite change, fatigue and unexpected weight change.   Psychiatric/Behavioral: Positive for decreased concentration and dysphoric mood. Negative for agitation, behavioral problems, confusion, hallucinations, self-injury, sleep disturbance and suicidal ideas. The patient is nervous/anxious. The patient is not hyperactive.        Objective   Physical Exam  Constitutional:       Appearance: Normal appearance.   Neurological:      Mental Status: She is alert.   Psychiatric:         Attention and Perception: Attention and perception normal.         Mood and Affect: Affect normal. Mood is anxious and depressed.         Speech: Speech normal.         Behavior: Behavior normal. Behavior is cooperative.         Thought Content: Thought content normal. Thought content does not include homicidal or suicidal ideation. Thought content does not include homicidal or suicidal plan.         Cognition and Memory: Cognition and memory normal.         Judgment: Judgment is impulsive.       not currently breastfeeding.    The patient was seen remotely today via a MyChart Video Visit through [facility].  Unable to obtain vital signs due to nature of remote visit.  Height stated at 67 inches.  Weight stated at 250 pounds.       Allergies   Allergen Reactions   • Amoxicillin Nausea And Vomiting   • Penicillins Nausea And Vomiting       No results found for this or any previous visit (from the past 2016 hour(s)).    Current Medications:   Current Outpatient Medications   Medication Sig Dispense Refill   • busPIRone (BUSPAR) 15 MG tablet Take 1 tablet by mouth 2 (Two) Times a Day. 60 tablet 0   • cloNIDine (CATAPRES) 0.1 MG tablet Take 1 tablet by mouth 2 (Two) Times a Day As Needed (anxiety/panic). 30 tablet 0   • Junel 1.5/30 1.5-30 MG-MCG tablet      • lamoTRIgine (LaMICtal) 100 MG tablet TAKE 1  TABLET BY MOUTH 2 (TWO) TIMES A DAY. 60 tablet 0   • traMADol (ULTRAM) 50 MG tablet      • venlafaxine XR (EFFEXOR-XR) 150 MG 24 hr capsule Take 1 capsule by mouth Daily. 30 capsule 0   • buPROPion (WELLBUTRIN) 75 MG tablet Take 1 tablet by mouth Daily. 30 tablet 0     No current facility-administered medications for this visit.       Mental Status Exam:   Hygiene:   good  Cooperation:  Cooperative  Eye Contact:  Good  Psychomotor Behavior:  Appropriate  Affect:  Full range  Hopelessness: Denies  Speech:  Normal  Thought Process:  Goal directed  Thought Content:  Normal  Suicidal:  None  Homicidal:  None  Hallucinations:  None  Delusion:  None  Memory:  Intact  Orientation:  Person, Place, Time and Situation  Reliability:  good  Insight:  Fair  Judgement:  Fair   Impulse Control:  Fair  Physical/Medical Issues:  Yes See medical history            Assessment & Plan   Diagnoses and all orders for this visit:    1. Bipolar I disorder, most recent episode (or current) manic with anxious distress (HCC) (Primary)  -     busPIRone (BUSPAR) 15 MG tablet; Take 1 tablet by mouth 2 (Two) Times a Day.  Dispense: 60 tablet; Refill: 0  -     buPROPion (WELLBUTRIN) 75 MG tablet; Take 1 tablet by mouth Daily.  Dispense: 30 tablet; Refill: 0    2. Generalized anxiety disorder  -     busPIRone (BUSPAR) 15 MG tablet; Take 1 tablet by mouth 2 (Two) Times a Day.  Dispense: 60 tablet; Refill: 0  -     buPROPion (WELLBUTRIN) 75 MG tablet; Take 1 tablet by mouth Daily.  Dispense: 30 tablet; Refill: 0  -     cloNIDine (CATAPRES) 0.1 MG tablet; Take 1 tablet by mouth 2 (Two) Times a Day As Needed (anxiety/panic).  Dispense: 30 tablet; Refill: 0    3. Panic disorder (episodic paroxysmal anxiety)  -     busPIRone (BUSPAR) 15 MG tablet; Take 1 tablet by mouth 2 (Two) Times a Day.  Dispense: 60 tablet; Refill: 0  -     cloNIDine (CATAPRES) 0.1 MG tablet; Take 1 tablet by mouth 2 (Two) Times a Day As Needed (anxiety/panic).  Dispense: 30 tablet;  Refill: 0            In/Start Time: 9:02 AM EST.  Out/Stop Time: 9:18 AM EST.  16 minutes of face to face direct patient care with patient was spent for supportive psychotherapy including promoting the therapeutic alliance, strengthening self awareness and insights, strengthening coping skills, discussing relaxation techniques, counseling the patient regarding diagnoses, utilizing cognitive behavioral therapy to assist the patient in recognizing more appropriate coping mechanisms which are proven effective in reducing the severity of frequency of symptoms and and in coordination of care.  This APRN assisted the patient in processing the patient's diagnoses including bipolar and anxiety, and also acknowledged and normalized the patient's thoughts, feelings, and concerns  The patient is also strongly urged to eat healthy well balanced foods in order to reduce further health risks, and exercise as tolerated and in guidance with the patient's PCP's recommendations. This APRN allowed the patient to freely discuss issues without interruption or judgment.  This APRN answered any questions the patient had regarding medication and treatment plan.            Visit Diagnoses:    ICD-10-CM ICD-9-CM   1. Bipolar I disorder, most recent episode (or current) manic with anxious distress (HCC)  F31.10 296.40   2. Generalized anxiety disorder  F41.1 300.02   3. Panic disorder (episodic paroxysmal anxiety)  F41.0 300.01       GOALS:  Short Term Goals: Patient will be compliant with medication, and patient will have no significant medication related side effects.  Patient will be engaged in psychotherapy as indicated.  Patient will report subjective improvement of symptoms.  Long term goals: To stabilize mood and treat/improve subjective symptoms, the patient will stay out of the hospital, the patient will be at an optimal level of functioning, and the patient will take all medications as prescribed.  The patient verbalized  understanding and agreement with goals that were mutually set.      SUICIDE RISK ASSESSMENT: Unalterable demographics and a history of mental health intervention indicate this patient is in a high risk category compared to the general population. At present, the patient denies active SI/HI, intentions, or plans at this time and agrees to seek immediate care should such thoughts develop. The patient verbalizes understanding of how to access emergency care if needed and agrees to do so. Consideration of suicide risk and protective factors such as history, current presentation, individual strengths and weaknesses, psychosocial and environmental stressors and variables, psychiatric illness and symptoms, medical conditions and pain, took place in this interview. Based on those considerations, the patient is determined: within individual baseline and presenting no imminent risk for suicide or homicide. Other recommendations: The patient does not meet the criteria for inpatient admission and is not a safety risk to self or others at today's visit. Inpatient treatment offers no significant advantages over outpatient treatment for this patient at today's visit.      SAFETY PLAN:  Patient was given ample time for questions and fully participated in treatment planning.  Patient was encouraged to call the clinic with any questions or concerns.  Patient was informed of access to emergency care. If patient were to develop any significant symptomatology, suicidal ideation, homicidal ideation, any concerns, or feel unsafe at any time they are to call the clinic and if unable to get immediate assistance should immediately call 911 or go to the nearest emergency room.  The patient is advised to remove or secure (lock away) all lethal weapons (including guns) and sharps (including razors, scissors, knives, etc.).  All medications (including any prescribed and any over the counter medications) should be stored in a safe and secured  location that is not obtainable by children/adolescents.  Patient was given an opportunity and encouraged to ask questions about their medication, illness, and treatment. Patient contracted verbally for the following: If you are experiencing an emotional crisis or have thoughts of harming yourself or others, please go to your nearest local emergency room or call 911. Will continue to re-assess medication response and side effects frequently to establish efficacy and ensure safety. Risks, any black box warnings, side effects, off label usage, and benefits of medication and treatment discussed with patient, along with potential adverse side effects of current and/or newly prescribed medication, alternative treatment options, and OTC medications.  Patient verbalized understanding of potential risks, any off label use of medication, any black box warnings, and any side effects in their own words. The patient verbalized understanding and agreed to comply with the safety plan discussed in their own words.  Patient given the number to the office. Number also available to the 24- hour suicide hotline.      TREATMENT PLAN/GOALS: Continue medications and treatment plan as indicated. Treatment and medication options discussed during today's visit. Patient acknowledged and verbally consented to continue with current treatment plan and was educated on the importance of compliance with treatment and follow-up appointments.          -Begin Wellbutin 75 mg daily as adjunct for mood/depression.  Instructed the patient to cautiously monitoring for any destabilization of mood and/or exacerbation of symptoms with starting this medication.  Instructed the patient to immediately discontinue this medication and notify this APRN if she experiences any destabilization of mood and/or exacerbation of symptoms.  Discussed with the patient that id she tolerates this well we will discuss increasing to twice daily dosing as this medication is  recommended to be taken twice daily, but we will first trial starting once daily dosing due past response with starting Wellbutrin XL.  The patient verbalizes understanding and endorses that she is agreeable to this.    -Increase Buspar to 15 mg twice daily as adjunct for mood/anxiety   -ContinueEffexor  mg daily for mood/anxiety   -Continue Lamictal 100 mg twice daily for mood stabilization/bipolar  -Continue Clonidine 0.1 mg twice daily as needed for anxiety/panic  -Continue psychotherapy.  Patient has one last appointment with current therapist on 9/26/22 and is scheduled to begin seeing new therapist per patient's request to begin trauma-focused therapy on 10/3/22.          MEDICATION ISSUES: Discussed medication options and treatment plan of prescribed medication, any off label use of medication, as well as the risks, benefits, any black box warnings including increased suicidality, and side effects including but not limited to potential falls, dizziness, possible impaired driving, GI side effects (change in appetite, abdominal discomfort, nausea, vomiting, diarrhea, and/or constipation), dry mouth, somnolence, sedation, insomnia, activation, agitation, irritation, tremors, abnormal muscle movements or disorders, headache, sweating, possible bruising or rare bleeding, electrolyte and/or fluid abnormalities, change in blood pressure/heart rate/and or heart rhythm, sexual dysfunction, and metabolic adversities among others. Patient and/or guardian agreeable to call the office with any worsening of symptoms or onset of side effects, or if any concerns or questions arise.  The contact information for the office is made available to the patient and/or guardian.  Patient and/or guardian agreeable to call 911 or go to the nearest ER should they begin having any SI/HI, or if any urgent concerns arise. No medication side effects or related complaints today.    This APRN has discussed with the patient/guardian  about the possibility of serotonin syndrome when certain medications are taken together, as is the case with this patient.  This APRN has provided the patient/guardian with a list of symptoms of serotonin syndrome including symptoms of autonomic instability, altered sensorium, confusion, restlessness, agitation, myoclonus, hyperreflexia, hyperthermia, diaphoresis, tremor, chills, diarrhea and cramps, ataxia, headache, migraines, seizures, and insomnia; which could lead to permanent hyperthermic brain damage, cardiovascular collapse, coma, or even death.  The patient/guardian are instructed to stop medications immediately and either contact this APRN/this office during regular office hours, or go to the emergency department/call 911, if they begin to experience any of the symptoms discussed.  The benefits and risks of the current medication regimen are discussed with the patient/guardian, and they feel that the benefits out weigh the risks.  The patient/guardian verbalized understanding and agreement in their own words.    This APRN has discussed the benefits and risks of taking Lamictal (Lamotrigine).  The side effects of Lamictal can include a benign rash, blurred or double vision, dizziness, ataxia, sedation, headache, tremor, insomnia, poor coordination, fatigue,  nausea, vomiting, dyspepsia, rhinitis, infection, pharyngitis, asthenia, a rare but serious rash, rare multi-organ failure associated with Torres-Sander Syndrome, toxic epidermal necrolysis, drug hypersensitivity syndrome, rare blood dyscrasias, rare aseptic meningitis, rare sudden unexplained deaths in people with epilepsy, withdrawal seizures upon abrupt withdrawal, and rare activation of suicidal ideation and behavior (suicidality).  This APRN has discussed that a very slow dose titration when starting, or changing doses, of Lamictal may reduce the incidence of skin rash and other side effects.  The dosage should not be titrated upwards or  increased faster than recommended due to the possibility of the discussed side effects and risk of development of a skin rash (which can become life threatening).    This APRN has also discussed that if the patient stops taking the Lamictal for 5 days or longer, it will be necessary to restart the drug with an initial dose titration, as rashes have been reported on reexposure.  If the patient/guardian and Provider decide to stop the Lamictal, the patient/guardian will follow the directions of this APRN/this office as a guided taper over about two weeks is appropriate due to the risk of relapse in bipolar disorder with those with bipolar disorder, the risk of seizures in those with epilepsy, and discontinuation symptoms upon rapid discontinuation of Lamictal.    The patient/guardian verbalizes understanding of benefits and risks as discussed, the patient/guardian feels the benefits outweigh the risks and is agreeable to continue/take Lamictal as discussed.  The patient/guardian is advised should any side effects or rash develops they are to stop the Lamictal immediately and contact this APRN/this office or go to the emergency department immediately.  The patient/guardian verbalizes understanding and agreement with treatment plan in their own words.                        [facility] No Show Policy:  We understand unexpected circumstances arise; however, anytime you miss your appointment we are unable to provide you appropriate care.  In addition, each appointment missed could have been used to provide care for others.  We ask that you call at least 24 hours in advance to cancel or reschedule an appointment.  We would like to take this opportunity to remind you of our policy stating patients who miss THREE or more appointments without cancelling or rescheduling 24 hours in advance of the appointment may be subject to cancellation of any further visits with our clinic and recommendation to seek  in-person services/visits.    Please call [phone] to reschedule your appointment. If there are reasons that make it difficult for you to keep the appointments, please call and let us know how we can help.  Please understand that medication prescribing will not continue without seeing your provider.      [facility]'s No Show Policy reviewed with patient at today's visit. Patient verbalized understanding of this policy. Discussed with patient that in the event that there are three or more no show visits, it will be recommended that they pursue in-person services/visits as noncompliance with telehealth visits indicates that patient is not an appropriate candidate for telemedicine and would likely be more appropriate for in-person services/visits. Patient verbalizes understanding and is agreeable to this.              MEDS ORDERED DURING VISIT:  New Medications Ordered This Visit   Medications   • busPIRone (BUSPAR) 15 MG tablet     Sig: Take 1 tablet by mouth 2 (Two) Times a Day.     Dispense:  60 tablet     Refill:  0   • buPROPion (WELLBUTRIN) 75 MG tablet     Sig: Take 1 tablet by mouth Daily.     Dispense:  30 tablet     Refill:  0   • cloNIDine (CATAPRES) 0.1 MG tablet     Sig: Take 1 tablet by mouth 2 (Two) Times a Day As Needed (anxiety/panic).     Dispense:  30 tablet     Refill:  0       Return in about 2 weeks (around 10/5/2022), or if symptoms worsen or fail to improve, for Recheck.        Patient will follow-up in 2 weeks, highly encouraged the patient if she had any questions or concerns to contact the behavioral health virtual clinic for sooner appointment patient verbalized understanding.      Functional Status: Moderate impairment     Prognosis: Guarded with Ongoing Treatment             This document has been electronically signed by LENA Barajas  September 21, 2022 09:37 EDT       Some of the data in this electronic note has been brought forward from a previous  encounter, any necessary changes have been made, it has been reviewed by this APRN, and it is accurate.      Part of this note may be an electronic transcription/translation of spoken language to printed text using the Dragon Dictation System.

## 2022-09-26 ENCOUNTER — TELEMEDICINE (OUTPATIENT)
Dept: PSYCHIATRY | Facility: CLINIC | Age: 27
End: 2022-09-26

## 2022-09-26 DIAGNOSIS — F31.10: Primary | ICD-10-CM

## 2022-09-26 PROCEDURE — 90834 PSYTX W PT 45 MINUTES: CPT | Performed by: COUNSELOR

## 2022-09-26 NOTE — PROGRESS NOTES
Date: September 26, 2022  Time In: 9:00 am   Time Out: 9:51 am   This provider is located at the [facility]), [address], [place] [address] using a secure [employer] Video Visit through [employer]. Patient is being seen remotely via telehealth at home address in [place] and stated they are in a secure environment for this session. The patient's condition being diagnosed/treated is appropriate for telemedicine. The provider identified herself as well as her credentials. The patient, and/or patients guardian, consent to be seen remotely, and when consent is given they understand that the consent allows for patient identifiable information to be sent to a third party as needed. They may refuse to be seen remotely at any time. The electronic data is encrypted and password protected, and the patient and/or guardian has been advised of the potential risks to privacy not withstanding such measures.     You have chosen to receive care through a telehealth visit.  Do you consent to use a video/audio connection for your medical care today? Yes    PROGRESS NOTE  Data:  Genesis Chapin is a 27 y.o. female who presents today for follow up. Patient expressed that she recently had her medication monitoring appointment and APRN suggested that she see a male provider. Patient discussed that she feels that her wall is up in regards to males and stated that she does not trust males   Patient stated that she engages in I'll hurt you before you hurt me mentality with men and others. Patient stated feeling that people are out to get to get her and it becomes personal.Patient expressed that she is slowly pulling the strings in the relationship with her  and that it is impacting it negatively.Patient expressed that importance of remaining together.Panic attacks have been more prevalent and described on that she had a school meeting since last contact.   Patient feels  that there is progress with her medication and discussed on-going changes that are being made.  Patient shared that some of the medication is impacting her cycle. Patient shared concerns about pregnancy. Patient discussed with provider the stress related to thinking about the possibility of being pregnant. Patient also noted that there are stressor related to not being aware of laws and implications of having choices are.  Patient and provider discussed progress made since working with current provider and hopes while transitioning to another provider. Patient was praised by provider for advocating for herself and needs. Patient was wished luck and termination of therapeutic alliance has been completed.     Chief Complaint: mood regulation due to bipolar, anxiety and panic attacks    History of Present Illness: Patient has had on-going history      Clinical Maneuvering/Intervention: preparing for transfer, termination with current provider    (Scales based on 0 - 10 with 10 being the worst)  Depression:  not scaled during contact Anxiety: Not scaled during contact       Assisted patient in processing above session content; acknowledged and normalized patient’s thoughts, feelings, and concerns.  Rationalized patient thought process regarding concerns and planning for the future especially for patients children.  Discussed triggers associated with patient's anxiety and mood regulation.  Patient will continue to process and learn about effective coping skills to implement. Patient will be transferring to another provider and this is the last scheduled contact.       Allowed patient to freely discuss issues without interruption or judgment. Provided safe, confidential environment to facilitate the development of positive therapeutic relationship and encourage open, honest communication. Patient and provider discussed transfer to another provider. Current provider and patient discussed the positivity of doing and patient  was assured that current provider feels good about this. If risk factors which would indicate the need for higher level of care including thoughts to harm self or others and/or self-harming behavior then patient is encouraged to contact this office, call 911, or present to the nearest emergency room should any of these events occur. Patient adamantly and convincingly denies current suicidal or homicidal ideation or perceptual disturbance.    Assessment:   Assessment   Patient appears to maintain relative stability as compared to their baseline.  However, patient continues to struggle with anxiety and mood regulation. which continues to cause impairment in important areas of functioning.  A result, they can be reasonably expected to continue to benefit from treatment and would likely be at increased risk for decompensation otherwise.     Mental Status Exam:   Hygiene:   good  Cooperation:  Cooperative  Eye Contact:  Good  Psychomotor Behavior:  Appropriate  Affect:  Full range  Mood: normal  Speech:  Normal  Thought Process:  Goal directed  Thought Content:  Normal  Suicidal:  None  Homicidal:  None  Hallucinations:  None  Delusion:  None  Memory:  Intact  Orientation:  Person, Place, Time and Situation  Reliability:  good  Insight:  Good  Judgement:  Good  Impulse Control:  Good and Fair  Physical/Medical Issues:  No        Patient's Support Network Includes:   and children    Functional Status: Moderate impairment     Progress toward goal: Not at goal    Prognosis: Good with Ongoing Treatment          Plan:  Patient will continue in individual outpatient therapy with focus on improved functioning and coping skills, maintaining stability, and avoiding decompensation and the need for higher level of care.    Patient will adhere to medication regimen as prescribed and report any side effects. Patient will contact this office, call 911 or present to the nearest emergency room should suicidal or homicidal  ideations occur. Provide Cognitive Behavioral Therapy and Solution Focused Therapy to improve functioning, maintain stability, and avoid decompensation and the need for higher level of care.     Patient is transferring to another provider. Patient already has an appointment schedule with new transfer provider for the following week. Any further appointments will be cancelled with this provider.            VISIT DIAGNOSIS:     ICD-10-CM ICD-9-CM   1. Bipolar I disorder, most recent episode (or current) manic with anxious distress ([facility])  F31.10 296.40             This document has been electronically signed by CARIDAD Rodriguez  September 26, 2022 11:16 EDT      Part of this note may be an electronic transcription/translation of spoken language to printed text using the Dragon Dictation System.

## 2022-10-03 ENCOUNTER — TELEMEDICINE (OUTPATIENT)
Dept: PSYCHIATRY | Facility: CLINIC | Age: 27
End: 2022-10-03

## 2022-10-03 DIAGNOSIS — F33.1 MODERATE EPISODE OF RECURRENT MAJOR DEPRESSIVE DISORDER: ICD-10-CM

## 2022-10-03 DIAGNOSIS — F43.10 PTSD (POST-TRAUMATIC STRESS DISORDER): ICD-10-CM

## 2022-10-03 DIAGNOSIS — F41.1 GAD (GENERALIZED ANXIETY DISORDER): Primary | ICD-10-CM

## 2022-10-03 PROCEDURE — 90837 PSYTX W PT 60 MINUTES: CPT | Performed by: SOCIAL WORKER

## 2022-10-03 NOTE — PROGRESS NOTES
Date: October 5, 2022  Time In: 9:02  Time Out: 10:17  reffered by : No      This provider is located at home address in connection with the [facility] (through [facility]), [address], [place] [address] using a secure [employer] Video Visit through [employer]. Patient is being seen remotely via telehealth at home address in [place] and stated they are in a secure environment for this session. The patient's condition being diagnosed/treated is appropriate for telemedicine. The provider identified himself as well as his credentials. The patient, and/or patients guardian, consent to be seen remotely, and when consent is given they understand that the consent allows for patient identifiable information to be sent to a third party as needed. They may refuse to be seen remotely at any time. The electronic data is encrypted and password protected, and the patient and/or guardian has been advised of the potential risks to privacy not withstanding such measures.  You have chosen to receive care through a telehealth visit.  Do you consent to use a video/audio connection for your medical care today? Yes    PROGRESS NOTE  Data:  Genesis Chapin is a 27 y.o. female who presents today for transfer of care follow up.    Chief Complaint: Trauma    History of Present Illness: Patient has struggled with anxiety and depression related to history of trauma for some time. She has sought out help with such before and not been successful at addressing and reducing symptoms of such. She is open and honest today with this writer. She reports history of trust issues with men related to her trauma history. Reports difficulty with closeness/touch at times, frequently experiencing fight/flight with such. Also reports nightmares in past, now more strange dreams, possibly stress related. Describes periods of low mood for a month or so and then days of highs. Also reports having considered  possible personality disorder in past. It is this writer's belief based on today's encounter that thought and emotion patterns are likely result of trauma rather than indication of underlying mental health disorder (I.e. Bipolar disorder). Reports difficulty with focus/attention as well and patient has concerns about possible sensory processing issues.      Clinical Maneuvering/Intervention:  Assisted patient in processing above session content; acknowledged and normalized patient’s thoughts, feelings, and concerns.  Rationalized patient thought process regarding trust issues.  Discussed triggers associated with patient's anxiety/depression.  Discussed possible trauma treatment and process of such.    Allowed patient to freely discuss issues without interruption or judgment. Provided safe, confidential environment to facilitate the development of positive therapeutic relationship and encourage open, honest communication. Assisted patient in identifying risk factors which would indicate the need for higher level of care including thoughts to harm self or others and/or self-harming behavior and encouraged patient to contact this office, call 911, or present to the nearest emergency room should any of these events occur. Discussed crisis intervention services and means to access. Patient adamantly and convincingly denies current suicidal or homicidal ideation or perceptual disturbance.    Assessment:   Assessment   Patient appears to maintain relative stability as compared to their baseline.  However, patient continues to struggle with anxiety, depression, and PTSD which continues to cause impairment in important areas of functioning.  As a result, they can be reasonably expected to continue to benefit from treatment and would likely be at increased risk for decompensation otherwise.    Mental Status Exam:   Hygiene:   fair  Cooperation:  Cooperative  Eye Contact:  Good  Psychomotor Behavior:  Appropriate  Affect:   Blunted  Mood: depressed and anxious  Speech:  Normal  Thought Process:  Goal directed  Thought Content:  Normal  Suicidal:  None  Homicidal:  None  Hallucinations:  None  Delusion:  None  Memory:  Intact  Orientation:  Grossly intact  Reliability:  fair  Insight:  Fair  Judgement:  Fair  Impulse Control:  Poor  Physical/Medical Issues:  No        Patient's Support Network Includes:      Functional Status: Severe impairment    Progress toward goal: Not at goal    Prognosis: Fair with Ongoing Treatment          Plan:    Patient will continue in individual outpatient therapy with focus on improved functioning and coping skills, maintaining stability, and avoiding decompensation and the need for higher level of care.    Patient will adhere to medication regimen as prescribed and report any side effects. Patient will contact this office, call 911 or present to the nearest emergency room should suicidal or homicidal ideations occur. Provide Cognitive Behavioral Therapy and Solution Focused Therapy to improve functioning, maintain stability, and avoid decompensation and the need for higher level of care.     Return in about 4 weeks, or earlier if symptoms worsen or fail to improve.           VISIT DIAGNOSIS:     ICD-10-CM ICD-9-CM   1. JUANCARLOS (generalized anxiety disorder)  F41.1 300.02   2. Moderate episode of recurrent major depressive disorder (HCC)  F33.1 296.32   3. PTSD (post-traumatic stress disorder)  F43.10 309.81        [facility] No Show Policy:  We understand unexpected circumstances arise; however, anytime you miss your appointment we are unable to provide you appropriate care.  In addition, each appointment missed could have been used to provide care for others.  We ask that you call at least 24 hours in advance to cancel or reschedule an appointment.  We would like to take this opportunity to remind you of our policy stating patients who miss THREE or more appointments without  cancelling or rescheduling 24 hours in advance of the appointment may be subject to cancellation of any further visits with our clinic and recommendation to seek in-person services/visits.    Please call [phone] to reschedule your appointment. If there are reasons that make it difficult for you to keep the appointments, please call and let us know how we can help.  Please understand that medication prescribing will not continue without seeing your provider.      [facility]'s No Show Policy reviewed with patient at today's visit. Patient verbalized understanding of this policy. Discussed with patient that in the event that there are three or more no show visits, it will be recommended that they pursue in-person services/visits as noncompliance with telehealth visits indicates that patient is not an appropriate candidate for telemedicine and would likely be more appropriate for in-person services/visits. Patient verbalizes understanding and is agreeable to this.         This document has been electronically signed by Chapincito Parker LCSW  October 5, 2022 21:27 EDT     Part of this note may be an electronic transcription/translation of spoken language to printed text using the Dragon Dictation System.

## 2022-10-04 PROBLEM — F32.A DEPRESSION: Status: ACTIVE | Noted: 2022-10-04

## 2022-10-04 PROBLEM — F41.9 ANXIETY: Status: ACTIVE | Noted: 2022-10-04

## 2022-10-04 PROBLEM — F41.1 GAD (GENERALIZED ANXIETY DISORDER): Status: ACTIVE | Noted: 2022-10-04

## 2022-10-04 PROBLEM — F33.1 MODERATE EPISODE OF RECURRENT MAJOR DEPRESSIVE DISORDER (HCC): Status: ACTIVE | Noted: 2022-10-04

## 2022-10-04 PROBLEM — F43.10 PTSD (POST-TRAUMATIC STRESS DISORDER): Status: ACTIVE | Noted: 2022-10-04

## 2022-10-04 NOTE — PATIENT INSTRUCTIONS
Container exercise:     Some of what we talked about today were difficult life experiences you have been through. Sometimes as a result of talking about such, other experiences may begin to surface in thoughts, dreams, flashbacks, etc. Until we are able to meet again, please right down any additional experiences on a scrap of paper and put into a small container/box to address at our next session. Thank you!
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
## 2022-10-07 DIAGNOSIS — F41.1 GENERALIZED ANXIETY DISORDER: Chronic | ICD-10-CM

## 2022-10-07 DIAGNOSIS — F41.0 PANIC DISORDER (EPISODIC PAROXYSMAL ANXIETY): Chronic | ICD-10-CM

## 2022-10-10 RX ORDER — VENLAFAXINE HYDROCHLORIDE 150 MG/1
150 CAPSULE, EXTENDED RELEASE ORAL DAILY
Qty: 30 CAPSULE | Refills: 0 | OUTPATIENT
Start: 2022-10-10

## 2022-10-12 ENCOUNTER — TELEMEDICINE (OUTPATIENT)
Dept: PSYCHIATRY | Facility: CLINIC | Age: 27
End: 2022-10-12

## 2022-10-12 DIAGNOSIS — F31.30 BIPOLAR I DISORDER, MOST RECENT EPISODE DEPRESSED: Primary | Chronic | ICD-10-CM

## 2022-10-12 DIAGNOSIS — F41.0 PANIC DISORDER (EPISODIC PAROXYSMAL ANXIETY): Chronic | ICD-10-CM

## 2022-10-12 DIAGNOSIS — F41.1 GENERALIZED ANXIETY DISORDER: Chronic | ICD-10-CM

## 2022-10-12 PROCEDURE — 99214 OFFICE O/P EST MOD 30 MIN: CPT | Performed by: NURSE PRACTITIONER

## 2022-10-12 RX ORDER — BUPROPION HYDROCHLORIDE 300 MG/1
300 TABLET ORAL DAILY
Qty: 30 TABLET | Refills: 0 | Status: SHIPPED | OUTPATIENT
Start: 2022-10-12 | End: 2022-10-31 | Stop reason: SDUPTHER

## 2022-10-12 RX ORDER — LAMOTRIGINE 100 MG/1
100 TABLET ORAL 2 TIMES DAILY
Qty: 60 TABLET | Refills: 0 | Status: SHIPPED | OUTPATIENT
Start: 2022-10-12 | End: 2022-10-31 | Stop reason: SDUPTHER

## 2022-10-12 RX ORDER — VENLAFAXINE HYDROCHLORIDE 150 MG/1
150 CAPSULE, EXTENDED RELEASE ORAL DAILY
Qty: 30 CAPSULE | Refills: 0 | Status: SHIPPED | OUTPATIENT
Start: 2022-10-12 | End: 2022-10-31 | Stop reason: SDUPTHER

## 2022-10-12 RX ORDER — CLONIDINE HYDROCHLORIDE 0.1 MG/1
0.1 TABLET ORAL 2 TIMES DAILY PRN
Qty: 30 TABLET | Refills: 0
Start: 2022-10-12 | End: 2022-10-31 | Stop reason: SDUPTHER

## 2022-10-12 RX ORDER — BUSPIRONE HYDROCHLORIDE 15 MG/1
15 TABLET ORAL 2 TIMES DAILY
Qty: 60 TABLET | Refills: 0 | Status: SHIPPED | OUTPATIENT
Start: 2022-10-12 | End: 2022-10-31 | Stop reason: SDUPTHER

## 2022-10-12 NOTE — PROGRESS NOTES
"This provider is located at the [facility] (through [facility]), [address], [facility], [address] using a secure [employer] Video Visit through [employer]. Patient is being seen remotely via telehealth at their home address in [place], and stated they are in a secure environment for this session. The patient's condition being diagnosed/treated is appropriate for telemedicine. The provider identified herself as well as her credentials. The patient, and/or patients guardian, consent to be seen remotely, and when consent is given they understand that the consent allows for patient identifiable information to be sent to a third party as needed. They may refuse to be seen remotely at any time. The electronic data is encrypted and password protected, and the patient and/or guardian has been advised of the potential risks to privacy not withstanding such measures.    You have chosen to receive care through a telehealth visit.  Do you consent to use a video/audio connection for your medical care today? Yes     Patient confirmed consent for today's encounter on 10/12/22.          Subjective   Genesis Chapin is a 27 y.o. female who is here today for medication management follow up.    Chief Complaint: Bipolar, anxiety, panic attacks    History of Present Illness:  The patient describes her mood as \"okay\" over the last few weeks.  The patient reports that overall she has been doing okay over the past few weeks.  The patient endorses that she tried beginning the Wellbutrin SR 75 mg daily, but states that it for several days she had not noticed any improvements in her mood/depression or motivation/fatigue, so states that she decided to increase to 150 mg daily.  The patient states that shortly after beginning the Wellbutrin  mg daily she remembered that she had a prescription remaining of the Wellbutrin  mg daily so she switched to taking this rather than the Wellbutrin SR " 150 mg daily.  She endorses that with retrying the Wellbutrin she did not notice any exacerbation of symptoms and states that her anxiety has continued to be well controlled and mood has remained stable.  The patient endorses that her concern is that the medication does not seem to be working as well for her mood/depression and motivation/fatigue as it was when trialed in the past and endorses that she would like to increase the dose at this time.  The patient reports her appetite as good.  The patient reports her sleep as good.  The patient denies any nightmares or bad dreams.  The patient denies any new medical problems or changes in medications since last appointment with this facility.  The patient denies any current side effects from her current medication regimen.  The patient denies any abnormal muscle movements or tics.  The patient rates her depression at a 5-6/10 on a 0-10 scale, with 10 being the worst.  The patient rates her anxiety at a 4/10 on a 0-10 scale, with 10 being the worst.  The patient rates hopelessness at a 0/10 on a 0-10 scale with 10 being the worst.  The patient denies suicidal ideations and homicidal ideations, and is convincing.  The patient denies any auditory hallucinations or visual hallucinations.  The patient does not endorse significant symptoms consistent with a psychotic illness.              LMP:  The patient reports menstrual irregularities.  The patient denies any chance of pregnancy at this time.  The patient was educated that her prescribed medications can have potential risk to a developing fetus. The patient is advised to contact this APRN/this office if she becomes pregnant or plans to become pregnant.  Pt verbalizes understanding and acknowledged agreement with this plan in her own words.          Past Psychiatric Medications:  Lexapro 10 mg daily - effective for depression, but ineffective for anxiety   Klonopin 0.5 mg three times daily as needed for anxiety/panic  -  effective but discontinued due to marijuana use  Hydroxyzine 25 mg three times daily as needed for anxiety/panic - ineffective  Seroquel - trialed 12.5 mg twice daily then 25 mg nightly.  Effective for irritability/mood swings, but caused sedation at both doses and unable to tolerate.   Propanolol 10 mg twice daily as needed for anxiety - caused sedation   Abilify 2 mg daily - partial efficacy for mood stabilization, but caused increased appetite  Adderall XR - induced zandra   Vraylar 3 mg daily - caused side effects (increase appetite/weight gain and sedation)  Effexor  mg daily - partial efficacy for depression/anxiety, but caused decreased libido  Pristiq 25 mg daily - caused excessive fatigue and increased anxiety  Wellbutrin  mg daily - effective for depression/motivation/fatigue, but caused severe exacerbation of anxiety and destabilization of mood        The following portions of the patient's history were reviewed and updated as appropriate: allergies, current medications, past family history, past medical history, past social history, past surgical history and problem list.    Review of Systems   Constitutional: Positive for fatigue. Negative for activity change, appetite change and unexpected weight change.   Psychiatric/Behavioral: Positive for decreased concentration and dysphoric mood. Negative for agitation, behavioral problems, confusion, hallucinations, self-injury, sleep disturbance and suicidal ideas. The patient is nervous/anxious. The patient is not hyperactive.        Objective   Physical Exam  Constitutional:       Appearance: Normal appearance.   Neurological:      Mental Status: She is alert.   Psychiatric:         Attention and Perception: Attention and perception normal.         Mood and Affect: Affect normal. Mood is depressed.         Speech: Speech normal.         Behavior: Behavior normal. Behavior is cooperative.         Thought Content: Thought content normal. Thought  content does not include homicidal or suicidal ideation. Thought content does not include homicidal or suicidal plan.         Cognition and Memory: Cognition and memory normal.         Judgment: Judgment is impulsive.       not currently breastfeeding.    The patient was seen remotely today via a MyChart Video Visit through [facility].  Unable to obtain vital signs due to nature of remote visit.  Height stated at 67 inches.  Weight stated at 250 pounds.       Allergies   Allergen Reactions   • Amoxicillin Nausea And Vomiting   • Penicillins Nausea And Vomiting       No results found for this or any previous visit (from the past 2016 hour(s)).    Current Medications:   Current Outpatient Medications   Medication Sig Dispense Refill   • busPIRone (BUSPAR) 15 MG tablet Take 1 tablet by mouth 2 (Two) Times a Day. 60 tablet 0   • cloNIDine (CATAPRES) 0.1 MG tablet Take 1 tablet by mouth 2 (Two) Times a Day As Needed (anxiety/panic). 30 tablet 0   • Junel 1.5/30 1.5-30 MG-MCG tablet      • lamoTRIgine (LaMICtal) 100 MG tablet Take 1 tablet by mouth 2 (Two) Times a Day. 60 tablet 0   • venlafaxine XR (EFFEXOR-XR) 150 MG 24 hr capsule Take 1 capsule by mouth Daily. 30 capsule 0   • buPROPion XL (WELLBUTRIN XL) 300 MG 24 hr tablet Take 1 tablet by mouth Daily. 30 tablet 0     No current facility-administered medications for this visit.       Mental Status Exam:   Hygiene:   good  Cooperation:  Cooperative  Eye Contact:  Good  Psychomotor Behavior:  Appropriate  Affect:  Full range  Hopelessness: Denies  Speech:  Normal  Thought Process:  Goal directed  Thought Content:  Normal  Suicidal:  None  Homicidal:  None  Hallucinations:  None  Delusion:  None  Memory:  Intact  Orientation:  Person, Place, Time and Situation  Reliability:  good  Insight:  Fair  Judgement:  Fair   Impulse Control:  Fair  Physical/Medical Issues:  Yes See medical history            Assessment & Plan   Diagnoses and all orders for this visit:    1. Bipolar I  disorder, most recent episode depressed (HCC) (Primary)  -     venlafaxine XR (EFFEXOR-XR) 150 MG 24 hr capsule; Take 1 capsule by mouth Daily.  Dispense: 30 capsule; Refill: 0  -     lamoTRIgine (LaMICtal) 100 MG tablet; Take 1 tablet by mouth 2 (Two) Times a Day.  Dispense: 60 tablet; Refill: 0  -     busPIRone (BUSPAR) 15 MG tablet; Take 1 tablet by mouth 2 (Two) Times a Day.  Dispense: 60 tablet; Refill: 0  -     buPROPion XL (WELLBUTRIN XL) 300 MG 24 hr tablet; Take 1 tablet by mouth Daily.  Dispense: 30 tablet; Refill: 0    2. Generalized anxiety disorder  -     venlafaxine XR (EFFEXOR-XR) 150 MG 24 hr capsule; Take 1 capsule by mouth Daily.  Dispense: 30 capsule; Refill: 0  -     busPIRone (BUSPAR) 15 MG tablet; Take 1 tablet by mouth 2 (Two) Times a Day.  Dispense: 60 tablet; Refill: 0  -     buPROPion XL (WELLBUTRIN XL) 300 MG 24 hr tablet; Take 1 tablet by mouth Daily.  Dispense: 30 tablet; Refill: 0  -     cloNIDine (CATAPRES) 0.1 MG tablet; Take 1 tablet by mouth 2 (Two) Times a Day As Needed (anxiety/panic).  Dispense: 30 tablet; Refill: 0    3. Panic disorder (episodic paroxysmal anxiety)  -     venlafaxine XR (EFFEXOR-XR) 150 MG 24 hr capsule; Take 1 capsule by mouth Daily.  Dispense: 30 capsule; Refill: 0  -     busPIRone (BUSPAR) 15 MG tablet; Take 1 tablet by mouth 2 (Two) Times a Day.  Dispense: 60 tablet; Refill: 0  -     cloNIDine (CATAPRES) 0.1 MG tablet; Take 1 tablet by mouth 2 (Two) Times a Day As Needed (anxiety/panic).  Dispense: 30 tablet; Refill: 0            Visit Diagnoses:    ICD-10-CM ICD-9-CM   1. Bipolar I disorder, most recent episode depressed (HCC)  F31.30 296.50   2. Generalized anxiety disorder  F41.1 300.02   3. Panic disorder (episodic paroxysmal anxiety)  F41.0 300.01       GOALS:  Short Term Goals: Patient will be compliant with medication, and patient will have no significant medication related side effects.  Patient will be engaged in psychotherapy as indicated.  Patient  will report subjective improvement of symptoms.  Long term goals: To stabilize mood and treat/improve subjective symptoms, the patient will stay out of the hospital, the patient will be at an optimal level of functioning, and the patient will take all medications as prescribed.  The patient verbalized understanding and agreement with goals that were mutually set.      SUICIDE RISK ASSESSMENT: Unalterable demographics and a history of mental health intervention indicate this patient is in a high risk category compared to the general population. At present, the patient denies active SI/HI, intentions, or plans at this time and agrees to seek immediate care should such thoughts develop. The patient verbalizes understanding of how to access emergency care if needed and agrees to do so. Consideration of suicide risk and protective factors such as history, current presentation, individual strengths and weaknesses, psychosocial and environmental stressors and variables, psychiatric illness and symptoms, medical conditions and pain, took place in this interview. Based on those considerations, the patient is determined: within individual baseline and presenting no imminent risk for suicide or homicide. Other recommendations: The patient does not meet the criteria for inpatient admission and is not a safety risk to self or others at today's visit. Inpatient treatment offers no significant advantages over outpatient treatment for this patient at today's visit.      SAFETY PLAN:  Patient was given ample time for questions and fully participated in treatment planning.  Patient was encouraged to call the clinic with any questions or concerns.  Patient was informed of access to emergency care. If patient were to develop any significant symptomatology, suicidal ideation, homicidal ideation, any concerns, or feel unsafe at any time they are to call the clinic and if unable to get immediate assistance should immediately call 911 or  go to the nearest emergency room.  The patient is advised to remove or secure (lock away) all lethal weapons (including guns) and sharps (including razors, scissors, knives, etc.).  All medications (including any prescribed and any over the counter medications) should be stored in a safe and secured location that is not obtainable by children/adolescents.  Patient was given an opportunity and encouraged to ask questions about their medication, illness, and treatment. Patient contracted verbally for the following: If you are experiencing an emotional crisis or have thoughts of harming yourself or others, please go to your nearest local emergency room or call 911. Will continue to re-assess medication response and side effects frequently to establish efficacy and ensure safety. Risks, any black box warnings, side effects, off label usage, and benefits of medication and treatment discussed with patient, along with potential adverse side effects of current and/or newly prescribed medication, alternative treatment options, and OTC medications.  Patient verbalized understanding of potential risks, any off label use of medication, any black box warnings, and any side effects in their own words. The patient verbalized understanding and agreed to comply with the safety plan discussed in their own words.  Patient given the number to the office. Number also available to the 24- hour suicide hotline.      TREATMENT PLAN/GOALS: Continue medications and treatment plan as indicated. Treatment and medication options discussed during today's visit. Patient acknowledged and verbally consented to continue with current treatment plan and was educated on the importance of compliance with treatment and follow-up appointments.          -Increase Wellbutrin XL to 300 mg daily as adjunct for mood/depression.  Discussed with the patient that since she has recently been taking Wellbutrin  mg daily and tolerated well with no  destabilization of mood/exacerbation of anxiety we will trial increasing to 300 mg daily at this time as she has reported that the 150 mg has not been quite as effective for her mood/depression.  Instructed the patient to monitor for any destabilization of mood and/or exacerbation of symptoms with increasing the dose of his medication.  Instructed the patient to immediately discontinue this medication and notify this APRN if she experiences any destabilization of mood and/or exacerbation of symptoms.  The patient verbalizes understanding and endorses that she is agreeable to this.    -Continue Effexor  mg daily for mood/anxiety   -Continue Buspar 15 mg twice daily as adjunct for mood/anxiety   -Continue Lamictal 100 mg twice daily for mood stabilization/bipolar  -Continue Clonidine 0.1 mg twice daily as needed for anxiety/panic  -Continue psychotherapy with Chapincito Parker LCSW        MEDICATION ISSUES: Discussed medication options and treatment plan of prescribed medication, any off label use of medication, as well as the risks, benefits, any black box warnings including increased suicidality, and side effects including but not limited to potential falls, dizziness, possible impaired driving, GI side effects (change in appetite, abdominal discomfort, nausea, vomiting, diarrhea, and/or constipation), dry mouth, somnolence, sedation, insomnia, activation, agitation, irritation, tremors, abnormal muscle movements or disorders, headache, sweating, possible bruising or rare bleeding, electrolyte and/or fluid abnormalities, change in blood pressure/heart rate/and or heart rhythm, sexual dysfunction, and metabolic adversities among others. Patient and/or guardian agreeable to call the office with any worsening of symptoms or onset of side effects, or if any concerns or questions arise.  The contact information for the office is made available to the patient and/or guardian.  Patient and/or guardian agreeable to  call 911 or go to the nearest ER should they begin having any SI/HI, or if any urgent concerns arise. No medication side effects or related complaints today.    This APRN has discussed with the patient/guardian about the possibility of serotonin syndrome when certain medications are taken together, as is the case with this patient.  This APRN has provided the patient/guardian with a list of symptoms of serotonin syndrome including symptoms of autonomic instability, altered sensorium, confusion, restlessness, agitation, myoclonus, hyperreflexia, hyperthermia, diaphoresis, tremor, chills, diarrhea and cramps, ataxia, headache, migraines, seizures, and insomnia; which could lead to permanent hyperthermic brain damage, cardiovascular collapse, coma, or even death.  The patient/guardian are instructed to stop medications immediately and either contact this APRN/this office during regular office hours, or go to the emergency department/call 911, if they begin to experience any of the symptoms discussed.  The benefits and risks of the current medication regimen are discussed with the patient/guardian, and they feel that the benefits out weigh the risks.  The patient/guardian verbalized understanding and agreement in their own words.    This APRN has discussed the benefits and risks of taking Lamictal (Lamotrigine).  The side effects of Lamictal can include a benign rash, blurred or double vision, dizziness, ataxia, sedation, headache, tremor, insomnia, poor coordination, fatigue,  nausea, vomiting, dyspepsia, rhinitis, infection, pharyngitis, asthenia, a rare but serious rash, rare multi-organ failure associated with Torres-Sander Syndrome, toxic epidermal necrolysis, drug hypersensitivity syndrome, rare blood dyscrasias, rare aseptic meningitis, rare sudden unexplained deaths in people with epilepsy, withdrawal seizures upon abrupt withdrawal, and rare activation of suicidal ideation and behavior (suicidality).  This  APRN has discussed that a very slow dose titration when starting, or changing doses, of Lamictal may reduce the incidence of skin rash and other side effects.  The dosage should not be titrated upwards or increased faster than recommended due to the possibility of the discussed side effects and risk of development of a skin rash (which can become life threatening).    This APRN has also discussed that if the patient stops taking the Lamictal for 5 days or longer, it will be necessary to restart the drug with an initial dose titration, as rashes have been reported on reexposure.  If the patient/guardian and Provider decide to stop the Lamictal, the patient/guardian will follow the directions of this APRN/this office as a guided taper over about two weeks is appropriate due to the risk of relapse in bipolar disorder with those with bipolar disorder, the risk of seizures in those with epilepsy, and discontinuation symptoms upon rapid discontinuation of Lamictal.    The patient/guardian verbalizes understanding of benefits and risks as discussed, the patient/guardian feels the benefits outweigh the risks and is agreeable to continue/take Lamictal as discussed.  The patient/guardian is advised should any side effects or rash develops they are to stop the Lamictal immediately and contact this APRN/this office or go to the emergency department immediately.  The patient/guardian verbalizes understanding and agreement with treatment plan in their own words.                        [facility] No Show Policy:  We understand unexpected circumstances arise; however, anytime you miss your appointment we are unable to provide you appropriate care.  In addition, each appointment missed could have been used to provide care for others.  We ask that you call at least 24 hours in advance to cancel or reschedule an appointment.  We would like to take this opportunity to remind you of our policy stating patients  who miss THREE or more appointments without cancelling or rescheduling 24 hours in advance of the appointment may be subject to cancellation of any further visits with our clinic and recommendation to seek in-person services/visits.    Please call [phone] to reschedule your appointment. If there are reasons that make it difficult for you to keep the appointments, please call and let us know how we can help.  Please understand that medication prescribing will not continue without seeing your provider.      [facility]'s No Show Policy reviewed with patient at today's visit. Patient verbalized understanding of this policy. Discussed with patient that in the event that there are three or more no show visits, it will be recommended that they pursue in-person services/visits as noncompliance with telehealth visits indicates that patient is not an appropriate candidate for telemedicine and would likely be more appropriate for in-person services/visits. Patient verbalizes understanding and is agreeable to this.              MEDS ORDERED DURING VISIT:  New Medications Ordered This Visit   Medications   • venlafaxine XR (EFFEXOR-XR) 150 MG 24 hr capsule     Sig: Take 1 capsule by mouth Daily.     Dispense:  30 capsule     Refill:  0   • lamoTRIgine (LaMICtal) 100 MG tablet     Sig: Take 1 tablet by mouth 2 (Two) Times a Day.     Dispense:  60 tablet     Refill:  0   • busPIRone (BUSPAR) 15 MG tablet     Sig: Take 1 tablet by mouth 2 (Two) Times a Day.     Dispense:  60 tablet     Refill:  0   • buPROPion XL (WELLBUTRIN XL) 300 MG 24 hr tablet     Sig: Take 1 tablet by mouth Daily.     Dispense:  30 tablet     Refill:  0   • cloNIDine (CATAPRES) 0.1 MG tablet     Sig: Take 1 tablet by mouth 2 (Two) Times a Day As Needed (anxiety/panic).     Dispense:  30 tablet     Refill:  0       Return in about 2 weeks (around 10/26/2022), or if symptoms worsen or fail to improve, for Recheck.        Patient  will follow-up in 2 weeks, highly encouraged the patient if she had any questions or concerns to contact the behavioral health [facility] clinic for sooner appointment patient verbalized understanding.      Functional Status: Moderate impairment     Prognosis: Guarded with Ongoing Treatment             This document has been electronically signed by LENA Barajas  October 12, 2022 09:31 EDT       Some of the data in this electronic note has been brought forward from a previous encounter, any necessary changes have been made, it has been reviewed by this APRN, and it is accurate.      Part of this note may be an electronic transcription/translation of spoken language to printed text using the Dragon Dictation System.

## 2022-10-31 ENCOUNTER — TELEMEDICINE (OUTPATIENT)
Dept: PSYCHIATRY | Facility: CLINIC | Age: 27
End: 2022-10-31

## 2022-10-31 DIAGNOSIS — F31.30 BIPOLAR I DISORDER, MOST RECENT EPISODE DEPRESSED: Primary | Chronic | ICD-10-CM

## 2022-10-31 DIAGNOSIS — F41.1 GENERALIZED ANXIETY DISORDER: Chronic | ICD-10-CM

## 2022-10-31 DIAGNOSIS — F41.0 PANIC DISORDER (EPISODIC PAROXYSMAL ANXIETY): Chronic | ICD-10-CM

## 2022-10-31 PROCEDURE — 99214 OFFICE O/P EST MOD 30 MIN: CPT | Performed by: NURSE PRACTITIONER

## 2022-10-31 RX ORDER — VENLAFAXINE HYDROCHLORIDE 150 MG/1
150 CAPSULE, EXTENDED RELEASE ORAL DAILY
Qty: 30 CAPSULE | Refills: 0 | Status: SHIPPED | OUTPATIENT
Start: 2022-10-31 | End: 2022-12-05 | Stop reason: SDUPTHER

## 2022-10-31 RX ORDER — BUSPIRONE HYDROCHLORIDE 15 MG/1
15 TABLET ORAL 2 TIMES DAILY
Qty: 60 TABLET | Refills: 0 | Status: SHIPPED | OUTPATIENT
Start: 2022-10-31 | End: 2022-12-05 | Stop reason: SDUPTHER

## 2022-10-31 RX ORDER — CLONIDINE HYDROCHLORIDE 0.1 MG/1
0.1 TABLET ORAL 2 TIMES DAILY PRN
Qty: 30 TABLET | Refills: 0
Start: 2022-10-31 | End: 2022-11-16

## 2022-10-31 RX ORDER — LAMOTRIGINE 100 MG/1
100 TABLET ORAL 2 TIMES DAILY
Qty: 60 TABLET | Refills: 0 | Status: SHIPPED | OUTPATIENT
Start: 2022-10-31 | End: 2022-12-05 | Stop reason: SDUPTHER

## 2022-10-31 RX ORDER — BUPROPION HYDROCHLORIDE 300 MG/1
300 TABLET ORAL DAILY
Qty: 30 TABLET | Refills: 0 | Status: SHIPPED | OUTPATIENT
Start: 2022-10-31 | End: 2022-11-21 | Stop reason: SDUPTHER

## 2022-10-31 NOTE — PROGRESS NOTES
"This provider is located at the [facility] (through [facility]), [address], [facility], [address] using a secure [employer] Video Visit through [employer]. Patient is being seen remotely via telehealth at their home address in [place], and stated they are in a secure environment for this session. The patient's condition being diagnosed/treated is appropriate for telemedicine. The provider identified herself as well as her credentials. The patient, and/or patients guardian, consent to be seen remotely, and when consent is given they understand that the consent allows for patient identifiable information to be sent to a third party as needed. They may refuse to be seen remotely at any time. The electronic data is encrypted and password protected, and the patient and/or guardian has been advised of the potential risks to privacy not withstanding such measures.    You have chosen to receive care through a telehealth visit.  Do you consent to use a video/audio connection for your medical care today? Yes     Patient confirmed consent for today's encounter on 10/31/22.          Subjective   Genesis Chapin is a 27 y.o. female who is here today for medication management follow up.    Chief Complaint: Bipolar, anxiety, panic attacks    History of Present Illness:  The patient describes her mood as \"better\" over the last few weeks.  The patient endorses that overall things have been going pretty good over the past couple of weeks.  She states that her mood has been better and symptoms have been more well-controlled since beginning Wellbutrin XL.  She states that her mood has fluctuated somewhat in the past couple of weeks, but states that she thinks this is because she is going to start her menstrual cycle soon and endorses that some mood fluctuation is normal for her with beginning her menstrual cycles.  The patient states \"for the most part\" her mood has been stable recently denies " "any recent symptoms of hypomania/zandra.  Reports that she has tolerated the increase dose of Wellbutrin XL well and denies any side effects.  She states that her motivation and fatigue have been significantly improved with beginning the increased dose.  She states that her anxiety has continued to be well controlled and denies any exacerbations of anxiety with beginning the increased dose of Wellbutrin XL, but endorses that she does continue to have anxiety at baseline which she endorses she feels is normal for her given life stressors.  She endorses that anxiety has been manageable and tolerable recently, which she endorses she is pleased with.  She states that she is somewhat concerned that the improvements in symptoms she has noticed with the Wellbutrin XL are not always consistent and do seem to have some slight fluctuation from day-to-day, but states that she feels that this could be due to her \"everyday normal life stuff\" such as her normal life stresses and things related to her children.  She states that overall she is very pleased with her response with Wellbutrin XL and the improvement she has noticed in the past couple of weeks since beginning the increased dose.  She states that she had her first therapy appointment with her new therapist and endorses that this went really well.  She states that she really likes her new therapist and she is hopeful that she will be able to gain a lot through therapy.  She states that her second visit is not until November because of scheduling difficulties, but states that after her first follow-up visit she is scheduled out biweekly through the end of December which she endorses she thinks will be very beneficial for her as she will be able to be engaged in therapy more consistently.  The patient reports her appetite as good.  She reports that her appetite has been more well-controlled since beginning the Wellbutrin XL and states that she has not been overeating nearly " as much.  Reports that she has been able to lose a little weight since beginning this medication, which she endorses she is very happy with because she was really struggling with achieving any weight loss prior to this despite dieting efforts.  She reports that she is still eating well and appetite has been good, but states that overall appetite has just been more well-controlled recently.  The patient endorses that overall sleep has been good recently.  She states that she has occasionally had some difficulty falling asleep, but states that when this occurs she will take 1 tablet of clonidine and endorses that this has been very effective for her.  She reports she has also continued using the clonidine as needed for exacerbations of anxiety/panic attacks and states that it has continued to be very effective for this as well.  The patient denies any new medical problems or changes in medications since last appointment with this facility.  The patient reports compliance with current medication regimen.  The patient denies any current side effects from her current medication regimen.  The patient denies any abnormal muscle movements or tics.  The patient rates her depression at a 2/10 on a 0-10 scale, with 10 being the worst.  The patient rates her anxiety at a 5/10 on a 0-10 scale, with 10 being the worst.  The patient rates hopelessness at a 0/10 on a 0-10 scale with 10 being the worst.  The patient would like to not adjust or change her medications at this visit.  She endorses that overall she is very pleased with management symptoms at this time and endorses that her current psychotropic medication regimen seems to be the most effective out of everything that she has tried thus far.  The patient denies suicidal ideations and homicidal ideations, and is convincing.  The patient denies any auditory hallucinations or visual hallucinations.  The patient does not endorse significant symptoms consistent with a psychotic  "illness.  The patient endorses that she is concerned the potential upcoming life stressor and how this may impact her mental health.  She states that her grandfather has been sick recently and she states \"it is not good\".  She endorses that he will likely pass away and endorses that this will be the first time that she has ever lost anyone that is close to her.  She states that she knows this will be difficult for her and her depression will likely increase with bereavement.  She endorses that this is concerning for her because she feels that she has made so much progress recently and mood/depression has been so much more well-controlled so she is fearful that this will trigger destabilization.  Discussed with the patient that we will closely monitor her symptoms throughout this time and discussed encouraged the patient to discuss with her therapist how to prepare for an expected loss and continue following with therapy as indicated to help her process the loss once it does occur.  The patient verbalizes understanding in her own words and endorses that she is agreeable to this.                LMP:  The patient reports menstrual irregularities.  The patient denies any chance of pregnancy at this time.  The patient was educated that her prescribed medications can have potential risk to a developing fetus. The patient is advised to contact this APRN/this office if she becomes pregnant or plans to become pregnant.  Pt verbalizes understanding and acknowledged agreement with this plan in her own words.          Past Psychiatric Medications:  Lexapro 10 mg daily - effective for depression, but ineffective for anxiety   Klonopin 0.5 mg three times daily as needed for anxiety/panic  - effective but discontinued due to marijuana use  Hydroxyzine 25 mg three times daily as needed for anxiety/panic - ineffective  Seroquel - trialed 12.5 mg twice daily then 25 mg nightly.  Effective for irritability/mood swings, but caused " sedation at both doses and unable to tolerate.   Propanolol 10 mg twice daily as needed for anxiety - caused sedation   Abilify 2 mg daily - partial efficacy for mood stabilization, but caused increased appetite  Adderall XR - induced zandra   Vraylar 3 mg daily - caused side effects (increase appetite/weight gain and sedation)  Effexor  mg daily - partial efficacy for depression/anxiety, but caused decreased libido  Pristiq 25 mg daily - caused excessive fatigue and increased anxiety  Wellbutrin  mg daily - effective for depression/motivation/fatigue, but caused severe exacerbation of anxiety and destabilization of mood        The following portions of the patient's history were reviewed and updated as appropriate: allergies, current medications, past family history, past medical history, past social history, past surgical history and problem list.    Review of Systems   Constitutional: Negative for activity change, appetite change, fatigue and unexpected weight change.   Psychiatric/Behavioral: Positive for decreased concentration, dysphoric mood and sleep disturbance. Negative for agitation, behavioral problems, confusion, hallucinations, self-injury and suicidal ideas. The patient is nervous/anxious. The patient is not hyperactive.        Objective   Physical Exam  Constitutional:       Appearance: Normal appearance.   Neurological:      Mental Status: She is alert.   Psychiatric:         Attention and Perception: Attention and perception normal.         Mood and Affect: Affect normal. Mood is anxious.         Speech: Speech normal.         Behavior: Behavior normal. Behavior is cooperative.         Thought Content: Thought content normal. Thought content does not include homicidal or suicidal ideation. Thought content does not include homicidal or suicidal plan.         Cognition and Memory: Cognition and memory normal.       unknown if currently breastfeeding.    The patient was seen remotely today  via a [place] Video Visit through [email or web address].  Unable to obtain vital signs due to nature of remote visit.  Height stated at 67 inches.  Weight stated at 250 pounds.       Allergies   Allergen Reactions   • Amoxicillin Nausea And Vomiting   • Penicillins Nausea And Vomiting       No results found for this or any previous visit (from the past 2016 hour(s)).    Current Medications:   Current Outpatient Medications   Medication Sig Dispense Refill   • buPROPion XL (WELLBUTRIN XL) 300 MG 24 hr tablet Take 1 tablet by mouth Daily. 30 tablet 0   • busPIRone (BUSPAR) 15 MG tablet Take 1 tablet by mouth 2 (Two) Times a Day. 60 tablet 0   • cloNIDine (CATAPRES) 0.1 MG tablet Take 1 tablet by mouth 2 (Two) Times a Day As Needed (anxiety/panic). 30 tablet 0   • Junel 1.5/30 1.5-30 MG-MCG tablet      • lamoTRIgine (LaMICtal) 100 MG tablet Take 1 tablet by mouth 2 (Two) Times a Day. 60 tablet 0   • venlafaxine XR (EFFEXOR-XR) 150 MG 24 hr capsule Take 1 capsule by mouth Daily. 30 capsule 0     No current facility-administered medications for this visit.       Mental Status Exam:   Hygiene:   good  Cooperation:  Cooperative  Eye Contact:  Good  Psychomotor Behavior:  Appropriate  Affect:  Full range  Hopelessness: Denies  Speech:  Normal  Thought Process:  Goal directed and Linear  Thought Content:  Normal  Suicidal:  None  Homicidal:  None  Hallucinations:  None  Delusion:  None  Memory:  Intact  Orientation:  Person, Place, Time and Situation  Reliability:  good  Insight:  Fair  Judgement:  Fair   Impulse Control:  Fair  Physical/Medical Issues:  Yes See medical history            Assessment & Plan   Diagnoses and all orders for this visit:    1. Bipolar I disorder, most recent episode depressed (HCC) (Primary)  -     buPROPion XL (WELLBUTRIN XL) 300 MG 24 hr tablet; Take 1 tablet by mouth Daily.  Dispense: 30 tablet; Refill: 0  -     busPIRone (BUSPAR) 15 MG tablet; Take 1 tablet by mouth 2 (Two) Times a Day.  Dispense: 60 tablet;  Refill: 0  -     lamoTRIgine (LaMICtal) 100 MG tablet; Take 1 tablet by mouth 2 (Two) Times a Day.  Dispense: 60 tablet; Refill: 0  -     venlafaxine XR (EFFEXOR-XR) 150 MG 24 hr capsule; Take 1 capsule by mouth Daily.  Dispense: 30 capsule; Refill: 0    2. Generalized anxiety disorder  -     buPROPion XL (WELLBUTRIN XL) 300 MG 24 hr tablet; Take 1 tablet by mouth Daily.  Dispense: 30 tablet; Refill: 0  -     busPIRone (BUSPAR) 15 MG tablet; Take 1 tablet by mouth 2 (Two) Times a Day.  Dispense: 60 tablet; Refill: 0  -     cloNIDine (CATAPRES) 0.1 MG tablet; Take 1 tablet by mouth 2 (Two) Times a Day As Needed (anxiety/panic).  Dispense: 30 tablet; Refill: 0  -     venlafaxine XR (EFFEXOR-XR) 150 MG 24 hr capsule; Take 1 capsule by mouth Daily.  Dispense: 30 capsule; Refill: 0    3. Panic disorder (episodic paroxysmal anxiety)  -     busPIRone (BUSPAR) 15 MG tablet; Take 1 tablet by mouth 2 (Two) Times a Day.  Dispense: 60 tablet; Refill: 0  -     cloNIDine (CATAPRES) 0.1 MG tablet; Take 1 tablet by mouth 2 (Two) Times a Day As Needed (anxiety/panic).  Dispense: 30 tablet; Refill: 0  -     venlafaxine XR (EFFEXOR-XR) 150 MG 24 hr capsule; Take 1 capsule by mouth Daily.  Dispense: 30 capsule; Refill: 0            Visit Diagnoses:    ICD-10-CM ICD-9-CM   1. Bipolar I disorder, most recent episode depressed (HCC)  F31.30 296.50   2. Generalized anxiety disorder  F41.1 300.02   3. Panic disorder (episodic paroxysmal anxiety)  F41.0 300.01       GOALS:  Short Term Goals: Patient will be compliant with medication, and patient will have no significant medication related side effects.  Patient will be engaged in psychotherapy as indicated.  Patient will report subjective improvement of symptoms.  Long term goals: To stabilize mood and treat/improve subjective symptoms, the patient will stay out of the hospital, the patient will be at an optimal level of functioning, and the patient will take all medications as  prescribed.  The patient verbalized understanding and agreement with goals that were mutually set.      SUICIDE RISK ASSESSMENT: Unalterable demographics and a history of mental health intervention indicate this patient is in a high risk category compared to the general population. At present, the patient denies active SI/HI, intentions, or plans at this time and agrees to seek immediate care should such thoughts develop. The patient verbalizes understanding of how to access emergency care if needed and agrees to do so. Consideration of suicide risk and protective factors such as history, current presentation, individual strengths and weaknesses, psychosocial and environmental stressors and variables, psychiatric illness and symptoms, medical conditions and pain, took place in this interview. Based on those considerations, the patient is determined: within individual baseline and presenting no imminent risk for suicide or homicide. Other recommendations: The patient does not meet the criteria for inpatient admission and is not a safety risk to self or others at today's visit. Inpatient treatment offers no significant advantages over outpatient treatment for this patient at today's visit.      SAFETY PLAN:  Patient was given ample time for questions and fully participated in treatment planning.  Patient was encouraged to call the clinic with any questions or concerns.  Patient was informed of access to emergency care. If patient were to develop any significant symptomatology, suicidal ideation, homicidal ideation, any concerns, or feel unsafe at any time they are to call the clinic and if unable to get immediate assistance should immediately call 911 or go to the nearest emergency room.  The patient is advised to remove or secure (lock away) all lethal weapons (including guns) and sharps (including razors, scissors, knives, etc.).  All medications (including any prescribed and any over the counter medications) should  be stored in a safe and secured location that is not obtainable by children/adolescents.  Patient was given an opportunity and encouraged to ask questions about their medication, illness, and treatment. Patient contracted verbally for the following: If you are experiencing an emotional crisis or have thoughts of harming yourself or others, please go to your nearest local emergency room or call 911. Will continue to re-assess medication response and side effects frequently to establish efficacy and ensure safety. Risks, any black box warnings, side effects, off label usage, and benefits of medication and treatment discussed with patient, along with potential adverse side effects of current and/or newly prescribed medication, alternative treatment options, and OTC medications.  Patient verbalized understanding of potential risks, any off label use of medication, any black box warnings, and any side effects in their own words. The patient verbalized understanding and agreed to comply with the safety plan discussed in their own words.  Patient given the number to the office. Number also available to the 24- hour suicide hotline.      TREATMENT PLAN/GOALS: Continue medications and treatment plan as indicated. Treatment and medication options discussed during today's visit. Patient acknowledged and verbally consented to continue with current treatment plan and was educated on the importance of compliance with treatment and follow-up appointments.        -Continue Effexor  mg daily for mood/anxiety   -Continue Wellbutrin  mg daily as adjunct for mood/depression  -Continue Buspar 15 mg twice daily as adjunct for mood/anxiety   -Continue Lamictal 100 mg twice daily for mood stabilization/bipolar  -Continue Clonidine 0.1 mg twice daily as needed for anxiety/panic  -Continue psychotherapy with Chapincito Parker LCSW        MEDICATION ISSUES: Discussed medication options and treatment plan of prescribed medication,  any off label use of medication, as well as the risks, benefits, any black box warnings including increased suicidality, and side effects including but not limited to potential falls, dizziness, possible impaired driving, GI side effects (change in appetite, abdominal discomfort, nausea, vomiting, diarrhea, and/or constipation), dry mouth, somnolence, sedation, insomnia, activation, agitation, irritation, tremors, abnormal muscle movements or disorders, headache, sweating, possible bruising or rare bleeding, electrolyte and/or fluid abnormalities, change in blood pressure/heart rate/and or heart rhythm, sexual dysfunction, and metabolic adversities among others. Patient and/or guardian agreeable to call the office with any worsening of symptoms or onset of side effects, or if any concerns or questions arise.  The contact information for the office is made available to the patient and/or guardian.  Patient and/or guardian agreeable to call 911 or go to the nearest ER should they begin having any SI/HI, or if any urgent concerns arise. No medication side effects or related complaints today.    This APRN has discussed with the patient/guardian about the possibility of serotonin syndrome when certain medications are taken together, as is the case with this patient.  This APRN has provided the patient/guardian with a list of symptoms of serotonin syndrome including symptoms of autonomic instability, altered sensorium, confusion, restlessness, agitation, myoclonus, hyperreflexia, hyperthermia, diaphoresis, tremor, chills, diarrhea and cramps, ataxia, headache, migraines, seizures, and insomnia; which could lead to permanent hyperthermic brain damage, cardiovascular collapse, coma, or even death.  The patient/guardian are instructed to stop medications immediately and either contact this APRN/this office during regular office hours, or go to the emergency department/call 911, if they begin to experience any of the symptoms  discussed.  The benefits and risks of the current medication regimen are discussed with the patient/guardian, and they feel that the benefits out weigh the risks.  The patient/guardian verbalized understanding and agreement in their own words.    This APRN has discussed the benefits and risks of taking Lamictal (Lamotrigine).  The side effects of Lamictal can include a benign rash, blurred or double vision, dizziness, ataxia, sedation, headache, tremor, insomnia, poor coordination, fatigue,  nausea, vomiting, dyspepsia, rhinitis, infection, pharyngitis, asthenia, a rare but serious rash, rare multi-organ failure associated with Torres-Sander Syndrome, toxic epidermal necrolysis, drug hypersensitivity syndrome, rare blood dyscrasias, rare aseptic meningitis, rare sudden unexplained deaths in people with epilepsy, withdrawal seizures upon abrupt withdrawal, and rare activation of suicidal ideation and behavior (suicidality).  This APRN has discussed that a very slow dose titration when starting, or changing doses, of Lamictal may reduce the incidence of skin rash and other side effects.  The dosage should not be titrated upwards or increased faster than recommended due to the possibility of the discussed side effects and risk of development of a skin rash (which can become life threatening).    This APRN has also discussed that if the patient stops taking the Lamictal for 5 days or longer, it will be necessary to restart the drug with an initial dose titration, as rashes have been reported on reexposure.  If the patient/guardian and Provider decide to stop the Lamictal, the patient/guardian will follow the directions of this APRN/this office as a guided taper over about two weeks is appropriate due to the risk of relapse in bipolar disorder with those with bipolar disorder, the risk of seizures in those with epilepsy, and discontinuation symptoms upon rapid discontinuation of Lamictal.    The patient/guardian  verbalizes understanding of benefits and risks as discussed, the patient/guardian feels the benefits outweigh the risks and is agreeable to continue/take Lamictal as discussed.  The patient/guardian is advised should any side effects or rash develops they are to stop the Lamictal immediately and contact this APRN/this office or go to the emergency department immediately.  The patient/guardian verbalizes understanding and agreement with treatment plan in their own words.                        [facility] No Show Policy:  We understand unexpected circumstances arise; however, anytime you miss your appointment we are unable to provide you appropriate care.  In addition, each appointment missed could have been used to provide care for others.  We ask that you call at least 24 hours in advance to cancel or reschedule an appointment.  We would like to take this opportunity to remind you of our policy stating patients who miss THREE or more appointments without cancelling or rescheduling 24 hours in advance of the appointment may be subject to cancellation of any further visits with our clinic and recommendation to seek in-person services/visits.    Please call [phone] to reschedule your appointment. If there are reasons that make it difficult for you to keep the appointments, please call and let us know how we can help.  Please understand that medication prescribing will not continue without seeing your provider.      [facility]'s No Show Policy reviewed with patient at today's visit. Patient verbalized understanding of this policy. Discussed with patient that in the event that there are three or more no show visits, it will be recommended that they pursue in-person services/visits as noncompliance with telehealth visits indicates that patient is not an appropriate candidate for telemedicine and would likely be more appropriate for in-person services/visits. Patient  verbalizes understanding and is agreeable to this.              MEDS ORDERED DURING VISIT:  New Medications Ordered This Visit   Medications   • buPROPion XL (WELLBUTRIN XL) 300 MG 24 hr tablet     Sig: Take 1 tablet by mouth Daily.     Dispense:  30 tablet     Refill:  0   • busPIRone (BUSPAR) 15 MG tablet     Sig: Take 1 tablet by mouth 2 (Two) Times a Day.     Dispense:  60 tablet     Refill:  0   • cloNIDine (CATAPRES) 0.1 MG tablet     Sig: Take 1 tablet by mouth 2 (Two) Times a Day As Needed (anxiety/panic).     Dispense:  30 tablet     Refill:  0   • lamoTRIgine (LaMICtal) 100 MG tablet     Sig: Take 1 tablet by mouth 2 (Two) Times a Day.     Dispense:  60 tablet     Refill:  0   • venlafaxine XR (EFFEXOR-XR) 150 MG 24 hr capsule     Sig: Take 1 capsule by mouth Daily.     Dispense:  30 capsule     Refill:  0       Return in about 3 weeks (around 11/21/2022), or if symptoms worsen or fail to improve, for Recheck.        Patient will follow-up in 3 weeks, highly encouraged the patient if she had any questions or concerns to contact the behavioral health virtual clinic for sooner appointment patient verbalized understanding.      Functional Status: Mild impairment     Prognosis: Fair with Ongoing Treatment               This document has been electronically signed by LENA Barajas  October 31, 2022 11:54 EDT       Some of the data in this electronic note has been brought forward from a previous encounter, any necessary changes have been made, it has been reviewed by this APRN, and it is accurate.      Part of this note may be an electronic transcription/translation of spoken language to printed text using the Dragon Dictation System.

## 2022-11-07 ENCOUNTER — TELEMEDICINE (OUTPATIENT)
Dept: PSYCHIATRY | Facility: CLINIC | Age: 27
End: 2022-11-07

## 2022-11-07 DIAGNOSIS — F43.10 PTSD (POST-TRAUMATIC STRESS DISORDER): ICD-10-CM

## 2022-11-07 DIAGNOSIS — F33.1 MODERATE EPISODE OF RECURRENT MAJOR DEPRESSIVE DISORDER: Primary | ICD-10-CM

## 2022-11-07 DIAGNOSIS — F41.1 GAD (GENERALIZED ANXIETY DISORDER): ICD-10-CM

## 2022-11-07 PROCEDURE — 90834 PSYTX W PT 45 MINUTES: CPT | Performed by: SOCIAL WORKER

## 2022-11-07 NOTE — PROGRESS NOTES
Date: November 7, 2022  Time In: 8:01  Time Out: 8:46    This provider is located at home address in connection with the [facility] (through [facility]), [address], [place] [address] using a secure [employer] Video Visit through [email or web address] Patient is being seen remotely via telehealth at home address in [place] and stated they are in a secure environment for this session. The patient's condition being diagnosed/treated is appropriate for telemedicine. The provider identified himself as well as his credentials. The patient, and/or patients guardian, consent to be seen remotely, and when consent is given they understand that the consent allows for patient identifiable information to be sent to a third party as needed. They may refuse to be seen remotely at any time. The electronic data is encrypted and password protected, and the patient and/or guardian has been advised of the potential risks to privacy not withstanding such measures.  You have chosen to receive care through a telehealth visit.  Do you consent to use a video/audio connection for your medical care today? Yes    PROGRESS NOTE  Data:  Genesis Chapin is a 27 y.o. female who presents today for follow up    Chief Complaint: Depression    History of Present Illness: Patient reports significant stressors related to being mother to 4 children, 2 of which are on Autism spectrum, and being primary homemaker as well. Reports additional stress related to interactions with ex, one of the children's fathers, related to medication for that child's ADHD. Reports ongoing strain on relationship with  and having little energy or interest in putting towards such. Reported being thankful for efforts to help her when they occur but feeling they are few and far between but wanting to be together and knowing it is also best for the kids. Reports improvement in terms of panic attacks. Reports ongoing social anxiety and  having to face that some with meetings for the kids. Reports some relief of financial stressors related to anticipated SSI approval for son. Reports guilt at times surrounding personal health choices. Also reports being thankful for financial support from  to get nails done or go on weekend trip to [place].      Clinical Maneuvering/Intervention:  Assisted patient in processing above session content; acknowledged and normalized patient’s thoughts, feelings, and concerns.  Rationalized patient thought process regarding being overwhelmed/overstimulated.  Discussed triggers associated with patient's depression and anxiety.  Also discussed attempting to improve communication with  including discussing shared goals/hopes for future as well as giving him one of her tasks to take on for awhile to help with the kids and the house.     Allowed patient to freely discuss issues without interruption or judgment. Provided safe, confidential environment to facilitate the development of positive therapeutic relationship and encourage open, honest communication. Assisted patient in identifying risk factors which would indicate the need for higher level of care including thoughts to harm self or others and/or self-harming behavior and encouraged patient to contact this office, call 911, or present to the nearest emergency room should any of these events occur. Discussed crisis intervention services and means to access. Patient adamantly and convincingly denies current suicidal or homicidal ideation or perceptual disturbance.    Assessment:   Assessment   Patient appears to maintain relative stability as compared to their baseline.  However, patient continues to struggle with anxiety, depression, and PTSD which continues to cause impairment in important areas of functioning.  As a result, they can be reasonably expected to continue to benefit from treatment and would likely be at increased risk for decompensation  otherwise.    Mental Status Exam:   Hygiene:   fair  Cooperation:  Cooperative  Eye Contact:  Good  Psychomotor Behavior:  Appropriate  Affect:  Blunted  Mood: depressed, anxious, irritable and fluctates  Speech:  Normal  Thought Process:  Goal directed  Thought Content:  Mood congruent  Suicidal:  None  Homicidal:  None  Hallucinations:  None  Delusion:  None  Memory:  Intact  Orientation:  Grossly intact  Reliability:  fair  Insight:  Fair  Judgement:  Fair  Impulse Control:  Fair  Physical/Medical Issues:  Yes see chart       Patient's Support Network Includes:      Functional Status: Moderate impairment     Progress toward goal: Not at goal    Prognosis: Fair with Ongoing Treatment          Plan:    Patient will continue in individual outpatient therapy with focus on improved functioning and coping skills, maintaining stability, and avoiding decompensation and the need for higher level of care.    Patient will adhere to medication regimen as prescribed and report any side effects. Patient will contact this office, call 911 or present to the nearest emergency room should suicidal or homicidal ideations occur. Provide Cognitive Behavioral Therapy and Solution Focused Therapy to improve functioning, maintain stability, and avoid decompensation and the need for higher level of care.     Return in about 1 week, or earlier if symptoms worsen or fail to improve.           VISIT DIAGNOSIS:     ICD-10-CM ICD-9-CM   1. Moderate episode of recurrent major depressive disorder (HCC)  F33.1 296.32   2. JUANCARLOS (generalized anxiety disorder)  F41.1 300.02   3. PTSD (post-traumatic stress disorder)  F43.10 309.81        [facility] No Show Policy:  We understand unexpected circumstances arise; however, anytime you miss your appointment we are unable to provide you appropriate care.  In addition, each appointment missed could have been used to provide care for others.  We ask that you call at least 24  hours in advance to cancel or reschedule an appointment.  We would like to take this opportunity to remind you of our policy stating patients who miss THREE or more appointments without cancelling or rescheduling 24 hours in advance of the appointment may be subject to cancellation of any further visits with our clinic and recommendation to seek in-person services/visits.    Please call [phone] to reschedule your appointment. If there are reasons that make it difficult for you to keep the appointments, please call and let us know how we can help.  Please understand that medication prescribing will not continue without seeing your provider.      [facility]'s No Show Policy reviewed with patient at today's visit. Patient verbalized understanding of this policy. Discussed with patient that in the event that there are three or more no show visits, it will be recommended that they pursue in-person services/visits as noncompliance with telehealth visits indicates that patient is not an appropriate candidate for telemedicine and would likely be more appropriate for in-person services/visits. Patient verbalizes understanding and is agreeable to this.         This document has been electronically signed by Chapincito Parker LCSW  November 7, 2022 14:31 EST     Part of this note may be an electronic transcription/translation of spoken language to printed text using the Dragon Dictation System.

## 2022-11-14 ENCOUNTER — TELEPHONE (OUTPATIENT)
Dept: PSYCHIATRY | Facility: CLINIC | Age: 27
End: 2022-11-14

## 2022-11-14 NOTE — TELEPHONE ENCOUNTER
Called patient and spoke to her about information from [facility], Patient agreed and will report to emergency department and will call this office if anything should develop.

## 2022-11-14 NOTE — TELEPHONE ENCOUNTER
Patient called she accidentally took 600mg of Wellbutrin instead 300mg. It had been an hour after when she called and she said she was just a bit energized and jittery but she had also drank coffee. Otherwise no issues she wanted to report so provider is aware.    Please Advise?    Thank You

## 2022-11-16 DIAGNOSIS — F41.0 PANIC DISORDER (EPISODIC PAROXYSMAL ANXIETY): Chronic | ICD-10-CM

## 2022-11-16 DIAGNOSIS — F41.1 GENERALIZED ANXIETY DISORDER: Chronic | ICD-10-CM

## 2022-11-16 RX ORDER — CLONIDINE HYDROCHLORIDE 0.1 MG/1
0.1 TABLET ORAL 2 TIMES DAILY PRN
Qty: 30 TABLET | Refills: 0 | Status: SHIPPED | OUTPATIENT
Start: 2022-11-16 | End: 2022-12-05 | Stop reason: SDUPTHER

## 2022-11-21 ENCOUNTER — TELEMEDICINE (OUTPATIENT)
Dept: PSYCHIATRY | Facility: CLINIC | Age: 27
End: 2022-11-21

## 2022-11-21 DIAGNOSIS — F41.0 PANIC DISORDER (EPISODIC PAROXYSMAL ANXIETY): Chronic | ICD-10-CM

## 2022-11-21 DIAGNOSIS — F31.30 BIPOLAR I DISORDER, MOST RECENT EPISODE DEPRESSED: Primary | Chronic | ICD-10-CM

## 2022-11-21 DIAGNOSIS — F41.1 GENERALIZED ANXIETY DISORDER: Chronic | ICD-10-CM

## 2022-11-21 PROCEDURE — 99214 OFFICE O/P EST MOD 30 MIN: CPT | Performed by: NURSE PRACTITIONER

## 2022-11-21 RX ORDER — BUPROPION HYDROCHLORIDE 150 MG/1
450 TABLET ORAL EVERY MORNING
Qty: 90 TABLET | Refills: 0 | Status: SHIPPED | OUTPATIENT
Start: 2022-11-21 | End: 2022-12-05 | Stop reason: SDUPTHER

## 2022-11-21 NOTE — PROGRESS NOTES
"This provider is located at the [facility] (through [facility] [place] [facility]), [address], [facility], [address] using a secure [employer] Video Visit through [employer]. Patient is being seen remotely via telehealth at their home address in [place], and stated they are in a secure environment for this session. The patient's condition being diagnosed/treated is appropriate for telemedicine. The provider identified herself as well as her credentials. The patient, and/or patients guardian, consent to be seen remotely, and when consent is given they understand that the consent allows for patient identifiable information to be sent to a third party as needed. They may refuse to be seen remotely at any time. The electronic data is encrypted and password protected, and the patient and/or guardian has been advised of the potential risks to privacy not withstanding such measures.    You have chosen to receive care through a telehealth visit.  Do you consent to use a video/audio connection for your medical care today? Yes     Patient confirmed consent for today's encounter on 11/21/22.          Subjective   Genesis Chapin is a 27 y.o. female who is here today for medication management follow up.    Chief Complaint: Bipolar, anxiety, panic attacks    History of Present Illness:  The patient describes her mood as \"good\" over the last few weeks.  The patient endorses that she has been doing really well over the past few weeks.  The patient denies any recent symptoms of hypomania/zandra.  She endorses that her mood/depression and anxiety are currently more well-controlled than they have been in quite some time.  She states that she thinks the Wellbutrin XL, in combination with her other psychotropic medications, has been very effective in managing her symptoms.  She states that she inadvertently took 600 mg of Wellbutrin rather than the prescribed dose of 300 mg daily, but states that she did not have " "any adverse effects with this.  She explains that she had not picked up the new prescription for the Wellbutrin  mg daily from the pharmacy yet, so she attempted to take 300 mg from a previous prescription but states that she inadvertently took 600 mg.  She states that she did feel \"a little jittery\" with taking this dose, but otherwise states that she tolerated this well and endorses that she noticed significant improvements in her mood, concentration, and fatigue with the increased dose.  She also endorses that her mood remained stable and denies any mood destabilization or exacerbations of anxiety when she took the wrong dose inadvertently.  She states that she would be interested in increasing her Wellbutrin XL at this time for further management of her depression, concentration, and fatigue as she thinks she would tolerate the increased dose well and feels it would be more effective for further management of her symptoms.  She endorses that she is pleased with management of symptoms at this time, but states that if further improvement of symptoms is possible she would like to trial increasing the dose of Wellbutrin XL in attempt to achieve this.  She states that she will closely monitor for any destabilization of mood and/or exacerbation of symptoms and notify this APRN immediately and decreased dose of Wellbutrin XL back to 300 mg daily as this was previously effective and well tolerated.  The patient reports her appetite as good.  The patient reports her sleep as good.  The patient denies any nightmares or bad dreams.  The patient denies any new medical problems or changes in medications since last appointment with this facility.  The patient reports compliance with current medication regimen.  The patient denies any current side effects from her current medication regimen.  The patient denies any abnormal muscle movements or tics.  The patient rates her depression at a 2/10 on a 0-10 scale, with 10 " "being the worst.  The patient rates her anxiety at a 4/10 on a 0-10 scale, with 10 being the worst.  She endorses that her anxiety is much more well-controlled at this time.  She states that she does continue to have anxiety related to \"normal life stressors\" but endorses that she feels that anxiety is much more well-controlled and more manageable now than it has been in the past.  The patient rates hopelessness at a 0/10 on a 0-10 scale with 10 being the worst.  The patient denies suicidal ideations and homicidal ideations, and is convincing.  The patient denies any auditory hallucinations or visual hallucinations.  The patient does not endorse significant symptoms consistent with a psychotic illness.                LMP:  She reports that her last menstrual cycles was approximately 6 weeks ago.  The patient denies any chance of pregnancy at this time.  The patient reports menstrual irregularities over the past several months, but states that this started with changes in her birth control.  She states that she is planning to follow-up with her OBGYN soon in regards to menstrual irregularities.  The patient was educated that her prescribed medications can have potential risk to a developing fetus. The patient is advised to contact this APRN/this office if she becomes pregnant or plans to become pregnant.  Pt verbalizes understanding and acknowledged agreement with this plan in her own words.          Past Psychiatric Medications:  Lexapro 10 mg daily - effective for depression, but ineffective for anxiety   Klonopin 0.5 mg three times daily as needed for anxiety/panic  - effective but discontinued due to marijuana use  Hydroxyzine 25 mg three times daily as needed for anxiety/panic - ineffective  Seroquel - trialed 12.5 mg twice daily then 25 mg nightly.  Effective for irritability/mood swings, but caused sedation at both doses and unable to tolerate.   Propanolol 10 mg twice daily as needed for anxiety - caused " sedation   Abilify 2 mg daily - partial efficacy for mood stabilization, but caused increased appetite  Adderall XR - induced zandra   Vraylar 3 mg daily - caused side effects (increase appetite/weight gain and sedation)  Effexor  mg daily - partial efficacy for depression/anxiety, but caused decreased libido  Pristiq 25 mg daily - caused excessive fatigue and increased anxiety  Wellbutrin  mg daily - effective for depression/motivation/fatigue, but caused severe exacerbation of anxiety and destabilization of mood        The following portions of the patient's history were reviewed and updated as appropriate: allergies, current medications, past family history, past medical history, past social history, past surgical history and problem list.    Review of Systems   Constitutional: Positive for fatigue. Negative for activity change, appetite change and unexpected weight change.   Psychiatric/Behavioral: Positive for decreased concentration and dysphoric mood. Negative for agitation, behavioral problems, confusion, hallucinations, self-injury, sleep disturbance and suicidal ideas. The patient is nervous/anxious. The patient is not hyperactive.        Objective   Physical Exam  Constitutional:       Appearance: Normal appearance.   Neurological:      Mental Status: She is alert.   Psychiatric:         Attention and Perception: Attention and perception normal.         Mood and Affect: Affect normal. Mood is anxious and depressed.         Speech: Speech normal.         Behavior: Behavior normal. Behavior is cooperative.         Thought Content: Thought content normal. Thought content does not include homicidal or suicidal ideation. Thought content does not include homicidal or suicidal plan.         Cognition and Memory: Cognition and memory normal.         Judgment: Judgment normal.       unknown if currently breastfeeding.    The patient was seen remotely today via a MyChart Video Visit through [facility].  Unable  to obtain vital signs due to nature of remote visit.  Height stated at 67 inches.  Weight stated at 250 pounds.       Allergies   Allergen Reactions   • Amoxicillin Nausea And Vomiting   • Penicillins Nausea And Vomiting       No results found for this or any previous visit (from the past 2016 hour(s)).    Current Medications:   Current Outpatient Medications   Medication Sig Dispense Refill   • buPROPion XL (WELLBUTRIN XL) 150 MG 24 hr tablet Take 3 tablets by mouth Every Morning. 90 tablet 0   • busPIRone (BUSPAR) 15 MG tablet Take 1 tablet by mouth 2 (Two) Times a Day. 60 tablet 0   • cloNIDine (CATAPRES) 0.1 MG tablet TAKE 1 TABLET BY MOUTH 2 (TWO) TIMES A DAY AS NEEDED (ANXIETY/PANIC). 30 tablet 0   • Junel 1.5/30 1.5-30 MG-MCG tablet      • lamoTRIgine (LaMICtal) 100 MG tablet Take 1 tablet by mouth 2 (Two) Times a Day. 60 tablet 0   • venlafaxine XR (EFFEXOR-XR) 150 MG 24 hr capsule Take 1 capsule by mouth Daily. 30 capsule 0     No current facility-administered medications for this visit.       Mental Status Exam:   Hygiene:   good  Cooperation:  Cooperative  Eye Contact:  Good  Psychomotor Behavior:  Appropriate  Affect:  Full range  Hopelessness: Denies  Speech:  Normal  Thought Process:  Goal directed and Linear  Thought Content:  Normal  Suicidal:  None  Homicidal:  None  Hallucinations:  None  Delusion:  None  Memory:  Intact  Orientation:  Person, Place, Time and Situation  Reliability:  good  Insight:  Good  Judgement:  Good   Impulse Control:  Good  Physical/Medical Issues:  Yes See medical history            Assessment & Plan   Diagnoses and all orders for this visit:    1. Bipolar I disorder, most recent episode depressed (HCC) (Primary)  -     buPROPion XL (WELLBUTRIN XL) 150 MG 24 hr tablet; Take 3 tablets by mouth Every Morning.  Dispense: 90 tablet; Refill: 0    2. Generalized anxiety disorder  -     buPROPion XL (WELLBUTRIN XL) 150 MG 24 hr tablet; Take 3 tablets by mouth Every Morning.   Dispense: 90 tablet; Refill: 0    3. Panic disorder (episodic paroxysmal anxiety)            Visit Diagnoses:    ICD-10-CM ICD-9-CM   1. Bipolar I disorder, most recent episode depressed (HCC)  F31.30 296.50   2. Generalized anxiety disorder  F41.1 300.02   3. Panic disorder (episodic paroxysmal anxiety)  F41.0 300.01       GOALS:  Short Term Goals: Patient will be compliant with medication, and patient will have no significant medication related side effects.  Patient will be engaged in psychotherapy as indicated.  Patient will report subjective improvement of symptoms.  Long term goals: To stabilize mood and treat/improve subjective symptoms, the patient will stay out of the hospital, the patient will be at an optimal level of functioning, and the patient will take all medications as prescribed.  The patient verbalized understanding and agreement with goals that were mutually set.      SUICIDE RISK ASSESSMENT: Unalterable demographics and a history of mental health intervention indicate this patient is in a high risk category compared to the general population. At present, the patient denies active SI/HI, intentions, or plans at this time and agrees to seek immediate care should such thoughts develop. The patient verbalizes understanding of how to access emergency care if needed and agrees to do so. Consideration of suicide risk and protective factors such as history, current presentation, individual strengths and weaknesses, psychosocial and environmental stressors and variables, psychiatric illness and symptoms, medical conditions and pain, took place in this interview. Based on those considerations, the patient is determined: within individual baseline and presenting no imminent risk for suicide or homicide. Other recommendations: The patient does not meet the criteria for inpatient admission and is not a safety risk to self or others at today's visit. Inpatient treatment offers no significant advantages over  outpatient treatment for this patient at today's visit.      SAFETY PLAN:  Patient was given ample time for questions and fully participated in treatment planning.  Patient was encouraged to call the clinic with any questions or concerns.  Patient was informed of access to emergency care. If patient were to develop any significant symptomatology, suicidal ideation, homicidal ideation, any concerns, or feel unsafe at any time they are to call the clinic and if unable to get immediate assistance should immediately call 911 or go to the nearest emergency room.  The patient is advised to remove or secure (lock away) all lethal weapons (including guns) and sharps (including razors, scissors, knives, etc.).  All medications (including any prescribed and any over the counter medications) should be stored in a safe and secured location that is not obtainable by children/adolescents.  Patient was given an opportunity and encouraged to ask questions about their medication, illness, and treatment. Patient contracted verbally for the following: If you are experiencing an emotional crisis or have thoughts of harming yourself or others, please go to your nearest local emergency room or call 911. Will continue to re-assess medication response and side effects frequently to establish efficacy and ensure safety. Risks, any black box warnings, side effects, off label usage, and benefits of medication and treatment discussed with patient, along with potential adverse side effects of current and/or newly prescribed medication, alternative treatment options, and OTC medications.  Patient verbalized understanding of potential risks, any off label use of medication, any black box warnings, and any side effects in their own words. The patient verbalized understanding and agreed to comply with the safety plan discussed in their own words.  Patient given the number to the office. Number also available to the 24- hour suicide  hotline.      TREATMENT PLAN/GOALS: Continue medications and treatment plan as indicated. Treatment and medication options discussed during today's visit. Patient acknowledged and verbally consented to continue with current treatment plan and was educated on the importance of compliance with treatment and follow-up appointments.        -Increase Wellbutrin XL to 450 mg daily as adjunct for mood/depression.  The patient requests to increase dose of Wellbutrin XL at this time for further management of symptoms.  Reports that she inadvertently took an increased dose of 600 mg and noticed significant improvements in her mood, concentration, and fatigue but tolerated well without any destabilization of mood and/or exacerbation of symptoms.  Reports that she would like to try increasing dose of Wellbutrin XL at this time for further management of symptoms as she thinks she would tolerate the dose well and that it would be more effective for management of symptoms.  Discussed with the patient that we will trial increasing dose of Wellbutrin XL to 450 mg daily at this time.  Instructed the patient to monitor closely for any destabilization of mood and/or exacerbation of symptoms and notify this APRN immediately if this occurs.  The patient endorses that if she notices any destabilization of mood and/or exacerbation of symptoms she will immediately notify this APRN and decreased dose of Wellbutrin XL back to 300 mg daily as this was previously effective and well tolerated.  -Continue Effexor  mg daily for mood/anxiety   -Continue Buspar 15 mg twice daily as adjunct for mood/anxiety   -Continue Lamictal 100 mg twice daily for mood stabilization/bipolar  -Continue Clonidine 0.1 mg twice daily as needed for anxiety/panic  -Continue psychotherapy with Chapincito Parker LCSW        MEDICATION ISSUES: Discussed medication options and treatment plan of prescribed medication, any off label use of medication, as well as the  risks, benefits, any black box warnings including increased suicidality, and side effects including but not limited to potential falls, dizziness, possible impaired driving, GI side effects (change in appetite, abdominal discomfort, nausea, vomiting, diarrhea, and/or constipation), dry mouth, somnolence, sedation, insomnia, activation, agitation, irritation, tremors, abnormal muscle movements or disorders, headache, sweating, possible bruising or rare bleeding, electrolyte and/or fluid abnormalities, change in blood pressure/heart rate/and or heart rhythm, sexual dysfunction, and metabolic adversities among others. Patient and/or guardian agreeable to call the office with any worsening of symptoms or onset of side effects, or if any concerns or questions arise.  The contact information for the office is made available to the patient and/or guardian.  Patient and/or guardian agreeable to call 911 or go to the nearest ER should they begin having any SI/HI, or if any urgent concerns arise. No medication side effects or related complaints today.    This APRN has discussed with the patient/guardian about the possibility of serotonin syndrome when certain medications are taken together, as is the case with this patient.  This APRN has provided the patient/guardian with a list of symptoms of serotonin syndrome including symptoms of autonomic instability, altered sensorium, confusion, restlessness, agitation, myoclonus, hyperreflexia, hyperthermia, diaphoresis, tremor, chills, diarrhea and cramps, ataxia, headache, migraines, seizures, and insomnia; which could lead to permanent hyperthermic brain damage, cardiovascular collapse, coma, or even death.  The patient/guardian are instructed to stop medications immediately and either contact this APRN/this office during regular office hours, or go to the emergency department/call 911, if they begin to experience any of the symptoms discussed.  The benefits and risks of the  current medication regimen are discussed with the patient/guardian, and they feel that the benefits out weigh the risks.  The patient/guardian verbalized understanding and agreement in their own words.    This APRN has discussed the benefits and risks of taking Lamictal (Lamotrigine).  The side effects of Lamictal can include a benign rash, blurred or double vision, dizziness, ataxia, sedation, headache, tremor, insomnia, poor coordination, fatigue,  nausea, vomiting, dyspepsia, rhinitis, infection, pharyngitis, asthenia, a rare but serious rash, rare multi-organ failure associated with Torres-Sander Syndrome, toxic epidermal necrolysis, drug hypersensitivity syndrome, rare blood dyscrasias, rare aseptic meningitis, rare sudden unexplained deaths in people with epilepsy, withdrawal seizures upon abrupt withdrawal, and rare activation of suicidal ideation and behavior (suicidality).  This APRN has discussed that a very slow dose titration when starting, or changing doses, of Lamictal may reduce the incidence of skin rash and other side effects.  The dosage should not be titrated upwards or increased faster than recommended due to the possibility of the discussed side effects and risk of development of a skin rash (which can become life threatening).    This APRN has also discussed that if the patient stops taking the Lamictal for 5 days or longer, it will be necessary to restart the drug with an initial dose titration, as rashes have been reported on reexposure.  If the patient/guardian and Provider decide to stop the Lamictal, the patient/guardian will follow the directions of this APRN/this office as a guided taper over about two weeks is appropriate due to the risk of relapse in bipolar disorder with those with bipolar disorder, the risk of seizures in those with epilepsy, and discontinuation symptoms upon rapid discontinuation of Lamictal.    The patient/guardian verbalizes understanding of benefits and risks  as discussed, the patient/guardian feels the benefits outweigh the risks and is agreeable to continue/take Lamictal as discussed.  The patient/guardian is advised should any side effects or rash develops they are to stop the Lamictal immediately and contact this APRN/this office or go to the emergency department immediately.  The patient/guardian verbalizes understanding and agreement with treatment plan in their own words.                        [facility] No Show Policy:  We understand unexpected circumstances arise; however, anytime you miss your appointment we are unable to provide you appropriate care.  In addition, each appointment missed could have been used to provide care for others.  We ask that you call at least 24 hours in advance to cancel or reschedule an appointment.  We would like to take this opportunity to remind you of our policy stating patients who miss THREE or more appointments without cancelling or rescheduling 24 hours in advance of the appointment may be subject to cancellation of any further visits with our clinic and recommendation to seek in-person services/visits.    Please call [phone] to reschedule your appointment. If there are reasons that make it difficult for you to keep the appointments, please call and let us know how we can help.  Please understand that medication prescribing will not continue without seeing your provider.      [facility]'s No Show Policy reviewed with patient at today's visit. Patient verbalized understanding of this policy. Discussed with patient that in the event that there are three or more no show visits, it will be recommended that they pursue in-person services/visits as noncompliance with telehealth visits indicates that patient is not an appropriate candidate for telemedicine and would likely be more appropriate for in-person services/visits. Patient verbalizes understanding and is agreeable to  this.              MEDS ORDERED DURING VISIT:  New Medications Ordered This Visit   Medications   • buPROPion XL (WELLBUTRIN XL) 150 MG 24 hr tablet     Sig: Take 3 tablets by mouth Every Morning.     Dispense:  90 tablet     Refill:  0       Return in about 2 weeks (around 12/5/2022), or if symptoms worsen or fail to improve, for Recheck.        Patient will follow-up in 2 weeks, highly encouraged the patient if she had any questions or concerns to contact the behavioral health virtual clinic for sooner appointment patient verbalized understanding.      Functional Status: Mild impairment     Prognosis: Fair with Ongoing Treatment               This document has been electronically signed by LENA Barajas  November 21, 2022 11:34 EST       Some of the data in this electronic note has been brought forward from a previous encounter, any necessary changes have been made, it has been reviewed by this APRN, and it is accurate.      Part of this note may be an electronic transcription/translation of spoken language to printed text using the Dragon Dictation System.

## 2022-11-23 ENCOUNTER — TELEMEDICINE (OUTPATIENT)
Dept: PSYCHIATRY | Facility: CLINIC | Age: 27
End: 2022-11-23

## 2022-11-23 DIAGNOSIS — F41.1 GAD (GENERALIZED ANXIETY DISORDER): ICD-10-CM

## 2022-11-23 DIAGNOSIS — F33.1 MODERATE EPISODE OF RECURRENT MAJOR DEPRESSIVE DISORDER: Primary | ICD-10-CM

## 2022-11-23 DIAGNOSIS — F43.10 PTSD (POST-TRAUMATIC STRESS DISORDER): ICD-10-CM

## 2022-11-23 PROCEDURE — 90837 PSYTX W PT 60 MINUTES: CPT | Performed by: SOCIAL WORKER

## 2022-11-23 NOTE — PATIENT INSTRUCTIONS
"GO TO SELF-CARE:  Coloring  Candy crush  Taking shower  Using bathroom by myself   Brush teeth  Make self something to eat  Workout    YOUR WORDS:    \"Need to give self some nate\"    \"Only thing I can do is get myself healthy\"  "

## 2022-11-23 NOTE — PROGRESS NOTES
Date: November 23, 2022  Time In: 10:17  Time Out: 11:12    This provider is located at home address in connection with the [facility]through [facility]), [address], [place] [address] using a secure [employer] Video Visit through [employer]. Patient is being seen remotely via telehealth at home address in [place] and stated they are in a secure environment for this session. The patient's condition being diagnosed/treated is appropriate for telemedicine. The provider identified himself as well as his credentials. The patient, and/or patients guardian, consent to be seen remotely, and when consent is given they understand that the consent allows for patient identifiable information to be sent to a third party as needed. They may refuse to be seen remotely at any time. The electronic data is encrypted and password protected, and the patient and/or guardian has been advised of the potential risks to privacy not withstanding such measures.  You have chosen to receive care through a telehealth visit.  Do you consent to use a video/audio connection for your medical care today? Yes    PROGRESS NOTE  Data:  Genesis Chapin is a 27 y.o. female who presents today for follow up    Chief Complaint: depression    History of Present Illness: Patient reports ongoing stressors related to finances, marriage, and her children. Reports increased stress related to having to mead for son's social security. Reports some improvement in constant strain on attention, energy, and focus, as her son has started . Reports still struggling with finding balance and taking time for herself.     Clinical Maneuvering/Intervention:  Assisted patient in processing above session content; acknowledged and normalized patient’s thoughts, feelings, and concerns.  Rationalized patient thought process regarding self-care.  Discussed triggers associated with patient's depression and anxiety.  Also  discussed coping skills for patient to implement such as having a regular list of self-care items to turn to when she has time to herself.    Allowed patient to freely discuss issues without interruption or judgment. Provided safe, confidential environment to facilitate the development of positive therapeutic relationship and encourage open, honest communication. Assisted patient in identifying risk factors which would indicate the need for higher level of care including thoughts to harm self or others and/or self-harming behavior and encouraged patient to contact this office, call 911, or present to the nearest emergency room should any of these events occur. Discussed crisis intervention services and means to access. Patient adamantly and convincingly denies current suicidal or homicidal ideation or perceptual disturbance.    Assessment:   Assessment   Patient appears to maintain relative stability as compared to their baseline.  However, patient continues to struggle with depression, anxiety, and PTSD which continues to cause impairment in important areas of functioning.  As a result, they can be reasonably expected to continue to benefit from treatment and would likely be at increased risk for decompensation otherwise.    Mental Status Exam:   Hygiene:   fair  Cooperation:  Cooperative  Eye Contact:  Good  Psychomotor Behavior:  Appropriate  Affect:  Full range  Mood: depressed and anxious  Speech:  Normal  Thought Process:  Goal directed  Thought Content:  Mood congruent  Suicidal:  None  Homicidal:  None  Hallucinations:  None  Delusion:  None  Memory:  Intact  Orientation:  Grossly intact  Reliability:  fair  Insight:  Fair  Judgement:  Fair  Impulse Control:  Fair  Physical/Medical Issues:  Yes see chart       Patient's Support Network Includes:  extended family and best friend    Functional Status: Moderate impairment     Progress toward goal: Not at goal    Prognosis: Fair with Ongoing Treatment           Plan:    Patient will continue in individual outpatient therapy with focus on improved functioning and coping skills, maintaining stability, and avoiding decompensation and the need for higher level of care.    Patient will adhere to medication regimen as prescribed and report any side effects. Patient will contact this office, call 911 or present to the nearest emergency room should suicidal or homicidal ideations occur. Provide Cognitive Behavioral Therapy and Solution Focused Therapy to improve functioning, maintain stability, and avoid decompensation and the need for higher level of care.     Return in about 1 week, or earlier if symptoms worsen or fail to improve.           VISIT DIAGNOSIS:     ICD-10-CM ICD-9-CM   1. Moderate episode of recurrent major depressive disorder (HCC)  F33.1 296.32   2. JUANCARLOS (generalized anxiety disorder)  F41.1 300.02   3. PTSD (post-traumatic stress disorder)  F43.10 309.81        [facility] No Show Policy:  We understand unexpected circumstances arise; however, anytime you miss your appointment we are unable to provide you appropriate care.  In addition, each appointment missed could have been used to provide care for others.  We ask that you call at least 24 hours in advance to cancel or reschedule an appointment.  We would like to take this opportunity to remind you of our policy stating patients who miss THREE or more appointments without cancelling or rescheduling 24 hours in advance of the appointment may be subject to cancellation of any further visits with our clinic and recommendation to seek in-person services/visits.    Please call [phone] to reschedule your appointment. If there are reasons that make it difficult for you to keep the appointments, please call and let us know how we can help.  Please understand that medication prescribing will not continue without seeing your provider.      [facility]'s No Show  Policy reviewed with patient at today's visit. Patient verbalized understanding of this policy. Discussed with patient that in the event that there are three or more no show visits, it will be recommended that they pursue in-person services/visits as noncompliance with telehealth visits indicates that patient is not an appropriate candidate for telemedicine and would likely be more appropriate for in-person services/visits. Patient verbalizes understanding and is agreeable to this.         This document has been electronically signed by Chapincito Parker LCSW  November 23, 2022 11:55 EST     Part of this note may be an electronic transcription/translation of spoken language to printed text using the Dragon Dictation System.

## 2022-11-24 NOTE — PSYCHOTHERAPY NOTE
"  Just upped Wellbutrin for two days     About to have Alva/Thanksgiving     Some stress related to financial of such    Trying to get social security of son and it may not work out    Having to mead for disability and fight tooth and nail    Hoping to still figure it out, got approved, now they need a, b, and c and nervous to give them     Lavon started , hard for me, but he is doing so good, learned how to color, that is awesome, 4 days a week, 3 hours a day, on bus from 2:40-4, but loves it    You'd think I'd be getting more shit done, but still have daughter who is mirror image, almost 2, going on 20    If she naps, get 2 hours    I'm a restaurant    Talking about snow days, 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 Preparing self to not give a fuck, preparing to live with  as a roommate     Do want to do this with him because it's the easiest way to do it, not wanting to drag kids through divorce, custody, etc.         Packing food away, going to kill yourself, then what do I do?     All help from his family gone    I have gained a family, in a home, comfortable, never been this stable     Scary to be retracting from you...    If I have to ask you to be a parent, that is the worst    gonna go to [place] in March, you get to go on vacation...    Hardest shit ever did, 2 under 2    \"most likely to not have kids\"    Friends that lost their baby to SIDS, we were spending night at their house, she had another boy, then pregnant with another and stillborn     I've lost a baby    Always put self in other people's shoes, my problems are miniscule to theirs    Dying to give self some self-care, trying to be supportive but also repulsed     Getting time for self, sometimes has to be after the " kids go to sleep    Way more bitchy if overstimulated and picked at versus being up late    Go to self-care options:  Coloring  Candy crush  Taking shower  Using bathroom by myself   Brush teeth  Make self something to eat  Workout    Wanna be 27 and go out and he doesn't want that

## 2022-11-30 ENCOUNTER — TELEMEDICINE (OUTPATIENT)
Dept: PSYCHIATRY | Facility: CLINIC | Age: 27
End: 2022-11-30

## 2022-11-30 DIAGNOSIS — F41.1 GAD (GENERALIZED ANXIETY DISORDER): Primary | ICD-10-CM

## 2022-11-30 PROCEDURE — 90832 PSYTX W PT 30 MINUTES: CPT | Performed by: SOCIAL WORKER

## 2022-11-30 NOTE — PROGRESS NOTES
Date: November 30, 2022  Time In: 10:10  Time Out: 10:42    This provider is located at home address in connection with the [facility] (through [facility]), [address], [place] [address] using a secure [employer] Video Visit through [employer]. Patient is being seen remotely via telehealth at home address in [place] and stated they are in a secure environment for this session. The patient's condition being diagnosed/treated is appropriate for telemedicine. The provider identified himself as well as his credentials. The patient, and/or patients guardian, consent to be seen remotely, and when consent is given they understand that the consent allows for patient identifiable information to be sent to a third party as needed. They may refuse to be seen remotely at any time. The electronic data is encrypted and password protected, and the patient and/or guardian has been advised of the potential risks to privacy not withstanding such measures.  You have chosen to receive care through a telehealth visit.  Do you consent to use a video/audio connection for your medical care today? Yes    PROGRESS NOTE  Data:  Genesis Chapin is a 27 y.o. 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 Reports feeling that her mood is mostly stable but getting easily overwhelmed by everything that needs done around her and instead just sitting and relaxing although on the inside her mind is still going a mile a minute. Reports having handled change in structure well last week, but struggling some with transitioning back into routine this week as youngest son has been out of school until today. Reports having good things to focus on in the future and at times only being able to think about those things to get through.     Clinical  Maneuvering/Intervention:  Assisted patient in processing above session content; acknowledged and normalized patient’s thoughts, feelings, and concerns.  Rationalized patient thought process regarding self-care and negative critical self.  Discussed triggers associated with patient's anxiety.  Also discussed coping skills to practice such as those identified during last session.    Allowed patient to freely discuss issues without interruption or judgment. Provided safe, confidential environment to facilitate the development of positive therapeutic relationship and encourage open, honest communication. Assisted patient in identifying risk factors which would indicate the need for higher level of care including thoughts to harm self or others and/or self-harming behavior and encouraged patient to contact this office, call 911, or present to the nearest emergency room should any of these events occur. Discussed crisis intervention services and means to access. Patient adamantly and convincingly denies current suicidal or homicidal ideation or perceptual disturbance.    Assessment:   Assessment   Patient appears to maintain relative stability as compared to their baseline.  However, patient continues to struggle with anxiety which continues to cause impairment in important areas of functioning.  As a result, they can be reasonably expected to continue to benefit from treatment and would likely be at increased risk for decompensation otherwise.    Mental Status Exam:   Hygiene:   fair  Cooperation:  Cooperative  Eye Contact:  Good  Psychomotor Behavior:  Appropriate  Affect:  Blunted  Mood: normal and anxious  Speech:  Normal  Thought Process:  Goal directed  Thought Content:  Mood congruent  Suicidal:  None  Homicidal:  None  Hallucinations:  None  Delusion:  None  Memory:  Intact  Orientation:  Grossly intact  Reliability:  fair  Insight:  Fair  Judgement:  Fair  Impulse Control:  Fair  Physical/Medical Issues:  Yes  see chart       Patient's Support Network Includes:  extended family and best friend    Functional Status: Moderate impairment     Progress toward goal: Not at goal    Prognosis: Fair with Ongoing Treatment          Plan:    Patient will continue in individual outpatient therapy with focus on improved functioning and coping skills, maintaining stability, and avoiding decompensation and the need for higher level of care.    Patient will adhere to medication regimen as prescribed and report any side effects. Patient will contact this office, call 911 or present to the nearest emergency room should suicidal or homicidal ideations occur. Provide Cognitive Behavioral Therapy and Solution Focused Therapy to improve functioning, maintain stability, and avoid decompensation and the need for higher level of care.     Return in about 1 week, or earlier if symptoms worsen or fail to improve.           VISIT DIAGNOSIS:     ICD-10-CM ICD-9-CM   1. JUANCARLOS (generalized anxiety disorder)  F41.1 300.02        [facility] No Show Policy:  We understand unexpected circumstances arise; however, anytime you miss your appointment we are unable to provide you appropriate care.  In addition, each appointment missed could have been used to provide care for others.  We ask that you call at least 24 hours in advance to cancel or reschedule an appointment.  We would like to take this opportunity to remind you of our policy stating patients who miss THREE or more appointments without cancelling or rescheduling 24 hours in advance of the appointment may be subject to cancellation of any further visits with our clinic and recommendation to seek in-person services/visits.    Please call [phone] to reschedule your appointment. If there are reasons that make it difficult for you to keep the appointments, please call and let us know how we can help.  Please understand that medication prescribing will not continue without  seeing your provider.      [facility]'s No Show Policy reviewed with patient at today's visit. Patient verbalized understanding of this policy. Discussed with patient that in the event that there are three or more no show visits, it will be recommended that they pursue in-person services/visits as noncompliance with telehealth visits indicates that patient is not an appropriate candidate for telemedicine and would likely be more appropriate for in-person services/visits. Patient verbalizes understanding and is agreeable to this.         This document has been electronically signed by Chapincito Parker LCSW  November 30, 2022 21:26 EST     Part of this note may be an electronic transcription/translation of spoken language to printed text using the Dragon Dictation System.

## 2022-12-05 ENCOUNTER — TELEMEDICINE (OUTPATIENT)
Dept: PSYCHIATRY | Facility: CLINIC | Age: 27
End: 2022-12-05

## 2022-12-05 DIAGNOSIS — F41.1 GENERALIZED ANXIETY DISORDER: Chronic | ICD-10-CM

## 2022-12-05 DIAGNOSIS — F31.30 BIPOLAR I DISORDER, MOST RECENT EPISODE DEPRESSED: Primary | Chronic | ICD-10-CM

## 2022-12-05 DIAGNOSIS — F41.0 PANIC DISORDER (EPISODIC PAROXYSMAL ANXIETY): Chronic | ICD-10-CM

## 2022-12-05 PROCEDURE — 99214 OFFICE O/P EST MOD 30 MIN: CPT | Performed by: NURSE PRACTITIONER

## 2022-12-05 RX ORDER — CLONIDINE HYDROCHLORIDE 0.1 MG/1
0.1 TABLET ORAL 2 TIMES DAILY PRN
Qty: 30 TABLET | Refills: 0 | Status: SHIPPED | OUTPATIENT
Start: 2022-12-05 | End: 2023-01-17 | Stop reason: SDUPTHER

## 2022-12-05 RX ORDER — BUPROPION HYDROCHLORIDE 150 MG/1
450 TABLET ORAL EVERY MORNING
Qty: 90 TABLET | Refills: 0 | Status: SHIPPED | OUTPATIENT
Start: 2022-12-05 | End: 2023-01-17 | Stop reason: SDUPTHER

## 2022-12-05 RX ORDER — VENLAFAXINE HYDROCHLORIDE 150 MG/1
150 CAPSULE, EXTENDED RELEASE ORAL DAILY
Qty: 30 CAPSULE | Refills: 0 | Status: SHIPPED | OUTPATIENT
Start: 2022-12-05 | End: 2023-01-17 | Stop reason: SDUPTHER

## 2022-12-05 RX ORDER — LAMOTRIGINE 100 MG/1
100 TABLET ORAL 2 TIMES DAILY
Qty: 60 TABLET | Refills: 0 | Status: SHIPPED | OUTPATIENT
Start: 2022-12-05 | End: 2023-01-17 | Stop reason: SDUPTHER

## 2022-12-05 RX ORDER — BUSPIRONE HYDROCHLORIDE 15 MG/1
15 TABLET ORAL 2 TIMES DAILY
Qty: 60 TABLET | Refills: 0 | Status: SHIPPED | OUTPATIENT
Start: 2022-12-05 | End: 2023-01-17 | Stop reason: SDUPTHER

## 2022-12-05 NOTE — PROGRESS NOTES
"This provider is located at the [facility] (through [facility]), [address], [facility], [address] using a secure [employer] Video Visit through [employer]. Patient is being seen remotely via telehealth at their home address in [place], and stated they are in a secure environment for this session. The patient's condition being diagnosed/treated is appropriate for telemedicine. The provider identified herself as well as her credentials. The patient, and/or patients guardian, consent to be seen remotely, and when consent is given they understand that the consent allows for patient identifiable information to be sent to a third party as needed. They may refuse to be seen remotely at any time. The electronic data is encrypted and password protected, and the patient and/or guardian has been advised of the potential risks to privacy not withstanding such measures.    You have chosen to receive care through a telehealth visit.  Do you consent to use a video/audio connection for your medical care today? Yes     Patient confirmed consent for today's encounter on 12/5/22.          Subjective   Genesis Chapin is a 27 y.o. female who is here today for medication management follow up.    Chief Complaint: Bipolar, anxiety, panic attacks    History of Present Illness:  The patient describes her mood as \"good\" over the last few weeks.  The patient endorses that overall she has been doing well over the past couple of weeks.  She states that she has tolerated the increased dose of Wellbutrin XL well and denies any adverse effects.  She reports that her mood has been stable and denies any recent symptoms of hypomania/zandra.  She states that her motivation and fatigue have been improved with beginning the increased dose of Wellbutrin XL, which she endorses that she is pleased with.  She reports that her mood/depression and anxiety have also been improving.  The patient endorses that overall she " is very pleased with management of symptoms at this time with her current psychotropic medication regimen.  The patient reports her appetite as good.  The patient reports her sleep as good.  The patient denies any nightmares or bad dreams.  The patient denies any new medical problems or changes in medications since last appointment with this facility.  The patient reports compliance with current medication regimen.  The patient denies any current side effects from her current medication regimen.  The patient denies any abnormal muscle movements or tics.  The patient rates her depression at a 2/10 on a 0-10 scale, with 10 being the worst.  The patient rates her anxiety at a 4/10 on a 0-10 scale, with 10 being the worst.  The patient rates hopelessness at a 0/10 on a 0-10 scale with 10 being the worst.  The patient would like to not adjust or change her medications at this visit.  The patient denies suicidal ideations and homicidal ideations, and is convincing.  The patient denies any auditory hallucinations or visual hallucinations.  The patient does not endorse significant symptoms consistent with a psychotic illness.              LMP:  Currently menstruating.  The patient denies any chance of pregnancy at this time.  The patient reports menstrual irregularities over the past several months, but states that this started with changes in her birth control.  She states that she is planning to follow-up with her OBGYN soon in regards to menstrual irregularities.  The patient was educated that her prescribed medications can have potential risk to a developing fetus. The patient is advised to contact this APRN/this office if she becomes pregnant or plans to become pregnant.  Pt verbalizes understanding and acknowledged agreement with this plan in her own words.          Past Psychiatric Medications:  Lexapro 10 mg daily - effective for depression, but ineffective for anxiety   Klonopin 0.5 mg three times daily as needed  for anxiety/panic  - effective but discontinued due to marijuana use  Hydroxyzine 25 mg three times daily as needed for anxiety/panic - ineffective  Seroquel - trialed 12.5 mg twice daily then 25 mg nightly.  Effective for irritability/mood swings, but caused sedation at both doses and unable to tolerate.   Propanolol 10 mg twice daily as needed for anxiety - caused sedation   Abilify 2 mg daily - partial efficacy for mood stabilization, but caused increased appetite  Adderall XR - induced zandra   Vraylar 3 mg daily - caused side effects (increase appetite/weight gain and sedation)  Effexor  mg daily - partial efficacy for depression/anxiety, but caused decreased libido  Pristiq 25 mg daily - caused excessive fatigue and increased anxiety  Wellbutrin  mg daily - effective for depression/motivation/fatigue, but caused severe exacerbation of anxiety and destabilization of mood        The following portions of the patient's history were reviewed and updated as appropriate: allergies, current medications, past family history, past medical history, past social history, past surgical history and problem list.    Review of Systems   Constitutional: Negative for activity change, appetite change, fatigue and unexpected weight change.   Psychiatric/Behavioral: Positive for decreased concentration and dysphoric mood. Negative for agitation, behavioral problems, confusion, hallucinations, self-injury, sleep disturbance and suicidal ideas. The patient is nervous/anxious. The patient is not hyperactive.        Objective   Physical Exam  Constitutional:       Appearance: Normal appearance.   Neurological:      Mental Status: She is alert.   Psychiatric:         Attention and Perception: Attention and perception normal.         Mood and Affect: Affect normal. Mood is anxious.         Speech: Speech normal.         Behavior: Behavior normal. Behavior is cooperative.         Thought Content: Thought content normal.  Thought content does not include homicidal or suicidal ideation. Thought content does not include homicidal or suicidal plan.         Cognition and Memory: Cognition and memory normal.         Judgment: Judgment normal.       unknown if currently breastfeeding.    The patient was seen remotely today via a MyChart Video Visit through [facility].  Unable to obtain vital signs due to nature of remote visit.  Height stated at 67 inches.  Weight stated at 250 pounds.       Allergies   Allergen Reactions   • Amoxicillin Nausea And Vomiting   • Penicillins Nausea And Vomiting       No results found for this or any previous visit (from the past 2016 hour(s)).    Current Medications:   Current Outpatient Medications   Medication Sig Dispense Refill   • buPROPion XL (WELLBUTRIN XL) 150 MG 24 hr tablet Take 3 tablets by mouth Every Morning. 90 tablet 0   • busPIRone (BUSPAR) 15 MG tablet Take 1 tablet by mouth 2 (Two) Times a Day. 60 tablet 0   • cloNIDine (CATAPRES) 0.1 MG tablet Take 1 tablet by mouth 2 (Two) Times a Day As Needed (anxiety/panic). 30 tablet 0   • Junel 1.5/30 1.5-30 MG-MCG tablet      • lamoTRIgine (LaMICtal) 100 MG tablet Take 1 tablet by mouth 2 (Two) Times a Day. 60 tablet 0   • venlafaxine XR (EFFEXOR-XR) 150 MG 24 hr capsule Take 1 capsule by mouth Daily. 30 capsule 0     No current facility-administered medications for this visit.       Mental Status Exam:   Hygiene:   good  Cooperation:  Cooperative  Eye Contact:  Good  Psychomotor Behavior:  Appropriate  Affect:  Full range  Hopelessness: Denies  Speech:  Normal  Thought Process:  Goal directed and Linear  Thought Content:  Normal  Suicidal:  None  Homicidal:  None  Hallucinations:  None  Delusion:  None  Memory:  Intact  Orientation:  Person, Place, Time and Situation  Reliability:  good  Insight:  Good  Judgement:  Good   Impulse Control:  Good  Physical/Medical Issues:  Yes See medical history            Assessment & Plan   Diagnoses and all orders for  this visit:    1. Bipolar I disorder, most recent episode depressed (HCC) (Primary)  -     buPROPion XL (WELLBUTRIN XL) 150 MG 24 hr tablet; Take 3 tablets by mouth Every Morning.  Dispense: 90 tablet; Refill: 0  -     busPIRone (BUSPAR) 15 MG tablet; Take 1 tablet by mouth 2 (Two) Times a Day.  Dispense: 60 tablet; Refill: 0  -     lamoTRIgine (LaMICtal) 100 MG tablet; Take 1 tablet by mouth 2 (Two) Times a Day.  Dispense: 60 tablet; Refill: 0  -     venlafaxine XR (EFFEXOR-XR) 150 MG 24 hr capsule; Take 1 capsule by mouth Daily.  Dispense: 30 capsule; Refill: 0    2. Generalized anxiety disorder  -     buPROPion XL (WELLBUTRIN XL) 150 MG 24 hr tablet; Take 3 tablets by mouth Every Morning.  Dispense: 90 tablet; Refill: 0  -     busPIRone (BUSPAR) 15 MG tablet; Take 1 tablet by mouth 2 (Two) Times a Day.  Dispense: 60 tablet; Refill: 0  -     cloNIDine (CATAPRES) 0.1 MG tablet; Take 1 tablet by mouth 2 (Two) Times a Day As Needed (anxiety/panic).  Dispense: 30 tablet; Refill: 0  -     venlafaxine XR (EFFEXOR-XR) 150 MG 24 hr capsule; Take 1 capsule by mouth Daily.  Dispense: 30 capsule; Refill: 0    3. Panic disorder (episodic paroxysmal anxiety)  -     busPIRone (BUSPAR) 15 MG tablet; Take 1 tablet by mouth 2 (Two) Times a Day.  Dispense: 60 tablet; Refill: 0  -     cloNIDine (CATAPRES) 0.1 MG tablet; Take 1 tablet by mouth 2 (Two) Times a Day As Needed (anxiety/panic).  Dispense: 30 tablet; Refill: 0  -     venlafaxine XR (EFFEXOR-XR) 150 MG 24 hr capsule; Take 1 capsule by mouth Daily.  Dispense: 30 capsule; Refill: 0            Visit Diagnoses:    ICD-10-CM ICD-9-CM   1. Bipolar I disorder, most recent episode depressed (HCC)  F31.30 296.50   2. Generalized anxiety disorder  F41.1 300.02   3. Panic disorder (episodic paroxysmal anxiety)  F41.0 300.01       GOALS:  Short Term Goals: Patient will be compliant with medication, and patient will have no significant medication related side effects.  Patient will be  engaged in psychotherapy as indicated.  Patient will report subjective improvement of symptoms.  Long term goals: To stabilize mood and treat/improve subjective symptoms, the patient will stay out of the hospital, the patient will be at an optimal level of functioning, and the patient will take all medications as prescribed.  The patient verbalized understanding and agreement with goals that were mutually set.      SUICIDE RISK ASSESSMENT: Unalterable demographics and a history of mental health intervention indicate this patient is in a high risk category compared to the general population. At present, the patient denies active SI/HI, intentions, or plans at this time and agrees to seek immediate care should such thoughts develop. The patient verbalizes understanding of how to access emergency care if needed and agrees to do so. Consideration of suicide risk and protective factors such as history, current presentation, individual strengths and weaknesses, psychosocial and environmental stressors and variables, psychiatric illness and symptoms, medical conditions and pain, took place in this interview. Based on those considerations, the patient is determined: within individual baseline and presenting no imminent risk for suicide or homicide. Other recommendations: The patient does not meet the criteria for inpatient admission and is not a safety risk to self or others at today's visit. Inpatient treatment offers no significant advantages over outpatient treatment for this patient at today's visit.      SAFETY PLAN:  Patient was given ample time for questions and fully participated in treatment planning.  Patient was encouraged to call the clinic with any questions or concerns.  Patient was informed of access to emergency care. If patient were to develop any significant symptomatology, suicidal ideation, homicidal ideation, any concerns, or feel unsafe at any time they are to call the clinic and if unable to get  immediate assistance should immediately call 911 or go to the nearest emergency room.  The patient is advised to remove or secure (lock away) all lethal weapons (including guns) and sharps (including razors, scissors, knives, etc.).  All medications (including any prescribed and any over the counter medications) should be stored in a safe and secured location that is not obtainable by children/adolescents.  Patient was given an opportunity and encouraged to ask questions about their medication, illness, and treatment. Patient contracted verbally for the following: If you are experiencing an emotional crisis or have thoughts of harming yourself or others, please go to your nearest local emergency room or call 911. Will continue to re-assess medication response and side effects frequently to establish efficacy and ensure safety. Risks, any black box warnings, side effects, off label usage, and benefits of medication and treatment discussed with patient, along with potential adverse side effects of current and/or newly prescribed medication, alternative treatment options, and OTC medications.  Patient verbalized understanding of potential risks, any off label use of medication, any black box warnings, and any side effects in their own words. The patient verbalized understanding and agreed to comply with the safety plan discussed in their own words.  Patient given the number to the office. Number also available to the 24- hour suicide hotline.      TREATMENT PLAN/GOALS: Continue medications and treatment plan as indicated. Treatment and medication options discussed during today's visit. Patient acknowledged and verbally consented to continue with current treatment plan and was educated on the importance of compliance with treatment and follow-up appointments.        -Continue Effexor  mg daily for mood/anxiety   -Continue Buspar 15 mg twice daily as adjunct for mood/anxiety   -Continue Wellbutrin  mg daily  as adjunct for mood/depression  -Continue Lamictal 100 mg twice daily for mood stabilization/bipolar  -Continue Clonidine 0.1 mg twice daily as needed for anxiety/panic  -Continue psychotherapy with Chapincito Parker LCSW        MEDICATION ISSUES: Discussed medication options and treatment plan of prescribed medication, any off label use of medication, as well as the risks, benefits, any black box warnings including increased suicidality, and side effects including but not limited to potential falls, dizziness, possible impaired driving, GI side effects (change in appetite, abdominal discomfort, nausea, vomiting, diarrhea, and/or constipation), dry mouth, somnolence, sedation, insomnia, activation, agitation, irritation, tremors, abnormal muscle movements or disorders, headache, sweating, possible bruising or rare bleeding, electrolyte and/or fluid abnormalities, change in blood pressure/heart rate/and or heart rhythm, sexual dysfunction, and metabolic adversities among others. Patient and/or guardian agreeable to call the office with any worsening of symptoms or onset of side effects, or if any concerns or questions arise.  The contact information for the office is made available to the patient and/or guardian.  Patient and/or guardian agreeable to call 911 or go to the nearest ER should they begin having any SI/HI, or if any urgent concerns arise. No medication side effects or related complaints today.    This APRN has discussed with the patient/guardian about the possibility of serotonin syndrome when certain medications are taken together, as is the case with this patient.  This APRN has provided the patient/guardian with a list of symptoms of serotonin syndrome including symptoms of autonomic instability, altered sensorium, confusion, restlessness, agitation, myoclonus, hyperreflexia, hyperthermia, diaphoresis, tremor, chills, diarrhea and cramps, ataxia, headache, migraines, seizures, and insomnia; which  could lead to permanent hyperthermic brain damage, cardiovascular collapse, coma, or even death.  The patient/guardian are instructed to stop medications immediately and either contact this APRN/this office during regular office hours, or go to the emergency department/call 911, if they begin to experience any of the symptoms discussed.  The benefits and risks of the current medication regimen are discussed with the patient/guardian, and they feel that the benefits out weigh the risks.  The patient/guardian verbalized understanding and agreement in their own words.    This APRN has discussed the benefits and risks of taking Lamictal (Lamotrigine).  The side effects of Lamictal can include a benign rash, blurred or double vision, dizziness, ataxia, sedation, headache, tremor, insomnia, poor coordination, fatigue,  nausea, vomiting, dyspepsia, rhinitis, infection, pharyngitis, asthenia, a rare but serious rash, rare multi-organ failure associated with Torres-Sander Syndrome, toxic epidermal necrolysis, drug hypersensitivity syndrome, rare blood dyscrasias, rare aseptic meningitis, rare sudden unexplained deaths in people with epilepsy, withdrawal seizures upon abrupt withdrawal, and rare activation of suicidal ideation and behavior (suicidality).  This APRN has discussed that a very slow dose titration when starting, or changing doses, of Lamictal may reduce the incidence of skin rash and other side effects.  The dosage should not be titrated upwards or increased faster than recommended due to the possibility of the discussed side effects and risk of development of a skin rash (which can become life threatening).    This APRN has also discussed that if the patient stops taking the Lamictal for 5 days or longer, it will be necessary to restart the drug with an initial dose titration, as rashes have been reported on reexposure.  If the patient/guardian and Provider decide to stop the Lamictal, the patient/guardian  will follow the directions of this APRN/this office as a guided taper over about two weeks is appropriate due to the risk of relapse in bipolar disorder with those with bipolar disorder, the risk of seizures in those with epilepsy, and discontinuation symptoms upon rapid discontinuation of Lamictal.    The patient/guardian verbalizes understanding of benefits and risks as discussed, the patient/guardian feels the benefits outweigh the risks and is agreeable to continue/take Lamictal as discussed.  The patient/guardian is advised should any side effects or rash develops they are to stop the Lamictal immediately and contact this APRN/this office or go to the emergency department immediately.  The patient/guardian verbalizes understanding and agreement with treatment plan in their own words.                        [facility] No Show Policy:  We understand unexpected circumstances arise; however, anytime you miss your appointment we are unable to provide you appropriate care.  In addition, each appointment missed could have been used to provide care for others.  We ask that you call at least 24 hours in advance to cancel or reschedule an appointment.  We would like to take this opportunity to remind you of our policy stating patients who miss THREE or more appointments without cancelling or rescheduling 24 hours in advance of the appointment may be subject to cancellation of any further visits with our clinic and recommendation to seek in-person services/visits.    Please call [phone] to reschedule your appointment. If there are reasons that make it difficult for you to keep the appointments, please call and let us know how we can help.  Please understand that medication prescribing will not continue without seeing your provider.      [facility]'s No Show Policy reviewed with patient at today's visit. Patient verbalized understanding of this policy. Discussed with  patient that in the event that there are three or more no show visits, it will be recommended that they pursue in-person services/visits as noncompliance with telehealth visits indicates that patient is not an appropriate candidate for telemedicine and would likely be more appropriate for in-person services/visits. Patient verbalizes understanding and is agreeable to this.              MEDS ORDERED DURING VISIT:  New Medications Ordered This Visit   Medications   • buPROPion XL (WELLBUTRIN XL) 150 MG 24 hr tablet     Sig: Take 3 tablets by mouth Every Morning.     Dispense:  90 tablet     Refill:  0   • busPIRone (BUSPAR) 15 MG tablet     Sig: Take 1 tablet by mouth 2 (Two) Times a Day.     Dispense:  60 tablet     Refill:  0   • cloNIDine (CATAPRES) 0.1 MG tablet     Sig: Take 1 tablet by mouth 2 (Two) Times a Day As Needed (anxiety/panic).     Dispense:  30 tablet     Refill:  0   • lamoTRIgine (LaMICtal) 100 MG tablet     Sig: Take 1 tablet by mouth 2 (Two) Times a Day.     Dispense:  60 tablet     Refill:  0   • venlafaxine XR (EFFEXOR-XR) 150 MG 24 hr capsule     Sig: Take 1 capsule by mouth Daily.     Dispense:  30 capsule     Refill:  0       Return in about 4 weeks (around 1/2/2023), or if symptoms worsen or fail to improve, for Recheck.        Patient will follow-up in 4 weeks, highly encouraged the patient if she had any questions or concerns to contact the behavioral health [facility] for sooner appointment patient verbalized understanding.      Functional Status: Mild impairment     Prognosis: Fair with Ongoing Treatment               This document has been electronically signed by LENA Barajas  December 5, 2022 08:19 EST       Some of the data in this electronic note has been brought forward from a previous encounter, any necessary changes have been made, it has been reviewed by this APRN, and it is accurate.      Part of this note may be an electronic transcription/translation of spoken  language to printed text using the Dragon Dictation System.

## 2022-12-07 ENCOUNTER — TELEMEDICINE (OUTPATIENT)
Dept: PSYCHIATRY | Facility: CLINIC | Age: 27
End: 2022-12-07

## 2022-12-07 DIAGNOSIS — F41.1 GAD (GENERALIZED ANXIETY DISORDER): Primary | ICD-10-CM

## 2022-12-07 DIAGNOSIS — F43.10 PTSD (POST-TRAUMATIC STRESS DISORDER): ICD-10-CM

## 2022-12-07 PROCEDURE — 90834 PSYTX W PT 45 MINUTES: CPT | Performed by: SOCIAL WORKER
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
## 2022-12-21 ENCOUNTER — TELEMEDICINE (OUTPATIENT)
Dept: PSYCHIATRY | Facility: CLINIC | Age: 27
End: 2022-12-21

## 2022-12-21 DIAGNOSIS — F41.1 GAD (GENERALIZED ANXIETY DISORDER): Primary | ICD-10-CM

## 2022-12-21 DIAGNOSIS — F43.10 PTSD (POST-TRAUMATIC STRESS DISORDER): ICD-10-CM

## 2022-12-21 PROCEDURE — 90837 PSYTX W PT 60 MINUTES: CPT | Performed by: SOCIAL WORKER

## 2022-12-21 NOTE — PROGRESS NOTES
Date: December 21, 2022  Time In: 10:04  Time Out: 11:03    This provider is located at home address in connection with the [facility] (through [facility]), [address], [place] [address] using a secure [employer] Video Visit through [employer]. Patient is being seen remotely via telehealth at home address in [place] and stated they are in a secure environment for this session. The patient's condition being diagnosed/treated is appropriate for telemedicine. The provider identified himself as well as his credentials. The patient, and/or patients guardian, consent to be seen remotely, and when consent is given they understand that the consent allows for patient identifiable information to be sent to a third party as needed. They may refuse to be seen remotely at any time. The electronic data is encrypted and password protected, and the patient and/or guardian has been advised of the potential risks to privacy not withstanding such measures.  You have chosen to receive care through a telehealth visit.  Do you consent to use a video/audio connection for your medical care today? Yes    PROGRESS NOTE  Data:  Genesis Chapin is a 27 y.o. female who presents today for follow up    Chief Complaint: anxiety    History of Present Illness:   Reports ongoing stressors related to children being out of school and son being sick, kids being stuck inside with cold weather. Hate wrapping presents, always losing something.  home so able to get some housework done. Reports especially feeling overwhelmed in evenings when stressed with dinner, baths, and bedtime. Getting frustrated at times and then feeling guilty for yelling. Reports some concern about long-term effects of medications and hope to ween off somewhat in future. Continuing to look forward to trip to [place] and preparing for such, trying to eat better and lose some weight before then.      Clinical  Maneuvering/Intervention:  Assisted patient in processing above session content; acknowledged and normalized patient’s thoughts, feelings, and concerns.  Rationalized patient thought process regarding managing stressors and self-criticism  Discussed triggers associated with patient's anxiety and feeling overwhelmed at times to point of fight or flight.     Allowed patient to freely discuss issues without interruption or judgment. Provided safe, confidential environment to facilitate the development of positive therapeutic relationship and encourage open, honest communication. Assisted patient in identifying risk factors which would indicate the need for higher level of care including thoughts to harm self or others and/or self-harming behavior and encouraged patient to contact this office, call 911, or present to the nearest emergency room should any of these events occur. Discussed crisis intervention services and means to access. Patient adamantly and convincingly denies current suicidal or homicidal ideation or perceptual disturbance.    Assessment:   Assessment   Patient appears to maintain relative stability as compared to their baseline.  However, patient continues to struggle with anxiety and PTSD which continues to cause impairment in important areas of functioning.  As a result, they can be reasonably expected to continue to benefit from treatment and would likely be at increased risk for decompensation otherwise.    Mental Status Exam:   Hygiene:   fair  Cooperation:  Cooperative  Eye Contact:  Good  Psychomotor Behavior:  Appropriate  Affect:  Blunted  Mood: anxious, irritable and fluctates  Speech:  Normal  Thought Process:  Goal directed  Thought Content:  Mood congruent  Suicidal:  None  Homicidal:  None  Hallucinations:  None  Delusion:  None  Memory:  Intact  Orientation:  Grossly intact  Reliability:  fair  Insight:  Fair  Judgement:  Fair  Impulse Control:  Fair  Physical/Medical Issues:  Yes see  chart       Patient's Support Network Includes:  extended family and best friend    Functional Status: Moderate impairment     Progress toward goal: Not at goal    Prognosis: Fair with Ongoing Treatment          Plan:    Patient will continue in individual outpatient therapy with focus on improved functioning and coping skills, maintaining stability, and avoiding decompensation and the need for higher level of care.    Patient will adhere to medication regimen as prescribed and report any side effects. Patient will contact this office, call 911 or present to the nearest emergency room should suicidal or homicidal ideations occur. Provide Cognitive Behavioral Therapy and Solution Focused Therapy to improve functioning, maintain stability, and avoid decompensation and the need for higher level of care.     Return in about 2 weeks, or earlier if symptoms worsen or fail to improve.           VISIT DIAGNOSIS:     ICD-10-CM ICD-9-CM   1. JUANCARLOS (generalized anxiety disorder)  F41.1 300.02   2. PTSD (post-traumatic stress disorder)  F43.10 309.81        [facility] No Show Policy:  We understand unexpected circumstances arise; however, anytime you miss your appointment we are unable to provide you appropriate care.  In addition, each appointment missed could have been used to provide care for others.  We ask that you call at least 24 hours in advance to cancel or reschedule an appointment.  We would like to take this opportunity to remind you of our policy stating patients who miss THREE or more appointments without cancelling or rescheduling 24 hours in advance of the appointment may be subject to cancellation of any further visits with our clinic and recommendation to seek in-person services/visits.    Please call [phone] to reschedule your appointment. If there are reasons that make it difficult for you to keep the appointments, please call and let us know how we can help.  Please understand  that medication prescribing will not continue without seeing your provider.      [facility]'s No Show Policy reviewed with patient at today's visit. Patient verbalized understanding of this policy. Discussed with patient that in the event that there are three or more no show visits, it will be recommended that they pursue in-person services/visits as noncompliance with telehealth visits indicates that patient is not an appropriate candidate for telemedicine and would likely be more appropriate for in-person services/visits. Patient verbalizes understanding and is agreeable to this.         This document has been electronically signed by Chapincito Parker LCSW  December 23, 2022 09:43 EST     Part of this note may be an electronic transcription/translation of spoken language to printed text using the Dragon Dictation System.
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
## 2023-01-09 ENCOUNTER — TELEMEDICINE (OUTPATIENT)
Dept: PSYCHIATRY | Facility: CLINIC | Age: 28
End: 2023-01-09
Payer: COMMERCIAL

## 2023-01-09 ENCOUNTER — TELEPHONE (OUTPATIENT)
Dept: PSYCHIATRY | Facility: CLINIC | Age: 28
End: 2023-01-09

## 2023-01-09 DIAGNOSIS — F33.2 SEVERE EPISODE OF RECURRENT MAJOR DEPRESSIVE DISORDER, WITHOUT PSYCHOTIC FEATURES: Primary | ICD-10-CM

## 2023-01-09 DIAGNOSIS — F41.1 GAD (GENERALIZED ANXIETY DISORDER): ICD-10-CM

## 2023-01-09 PROCEDURE — 90832 PSYTX W PT 30 MINUTES: CPT | Performed by: SOCIAL WORKER

## 2023-01-09 NOTE — PROGRESS NOTES
Date: January 9, 2023  Time In: 11:34  Time Out: 12:10    This provider is located at home address in connection with the [facility]), [address], [place] [address] using a secure [employer] Video Visit through [employer]. Patient is being seen remotely via telehealth at home address in [place] and stated they are in a secure environment for this session. The patient's condition being diagnosed/treated is appropriate for telemedicine. The provider identified himself as well as his credentials. The patient, and/or patients guardian, consent to be seen remotely, and when consent is given they understand that the consent allows for patient identifiable information to be sent to a third party as needed. They may refuse to be seen remotely at any time. The electronic data is encrypted and password protected, and the patient and/or guardian has been advised of the potential risks to privacy not withstanding such measures.  You have chosen to receive care through a telehealth visit.  Do you consent to use a video/audio connection for your medical care today? Yes    PROGRESS NOTE  Data:  Genesis Chapin is a 27 y.o. female who presents today for follow up    Chief Complaint: anxiety    History of Present Illness: Reports severe depressive episode over the last few weeks. Reports at times having had temptation to self-harm, frequently yelling, and overwhelmed. Reports having had more open conversation with  regarding health. Reports frustration with children over gratitude for [employer] gifts. Some relief that 2 of 4 are back in school today and a third will be back in school tomorrow. Migraines, just wanting to sleep all day have been issues as well. Committed to losing weight for trip in April and counting calories.    Clinical Maneuvering/Intervention:  Assisted patient in processing above session content; acknowledged and normalized patient’s  thoughts, feelings, and concerns.  Rationalized patient thought process regarding impact of critical statements of others.  Discussed triggers associated with patient's anxiety and depression.    Allowed patient to freely discuss issues without interruption or judgment. Provided safe, confidential environment to facilitate the development of positive therapeutic relationship and encourage open, honest communication. Assisted patient in identifying risk factors which would indicate the need for higher level of care including thoughts to harm self or others and/or self-harming behavior and encouraged patient to contact this office, call 911, or present to the nearest emergency room should any of these events occur. Discussed crisis intervention services and means to access. Patient adamantly and convincingly denies current suicidal or homicidal ideation or perceptual disturbance.    Assessment:   Assessment   Patient appears to maintain relative stability as compared to their baseline.  However, patient continues to struggle with anxiety and depression which continues to cause impairment in important areas of functioning.  As a result, they can be reasonably expected to continue to benefit from treatment and would likely be at increased risk for decompensation otherwise.    Mental Status Exam:   Hygiene:   fair  Cooperation:  Cooperative  Eye Contact:  Good  Psychomotor Behavior:  Appropriate  Affect:  Blunted  Mood: depressed and anxious  Speech:  Normal  Thought Process:  Goal directed  Thought Content:  Mood congruent  Suicidal:  None  Homicidal:  None  Hallucinations:  None  Delusion:  None  Memory:  Intact  Orientation:  Grossly intact  Reliability:  fair  Insight:  Fair  Judgement:  Fair  Impulse Control:  Fair  Physical/Medical Issues:  Yes see chart       Patient's Support Network Includes:  extended family and best friend    Functional Status: Moderate impairment     Progress toward goal: Not at  goal    Prognosis: Fair with Ongoing Treatment          Plan:    Patient will continue in individual outpatient therapy with focus on improved functioning and coping skills, maintaining stability, and avoiding decompensation and the need for higher level of care.    Patient will adhere to medication regimen as prescribed and report any side effects. Patient will contact this office, call 911 or present to the nearest emergency room should suicidal or homicidal ideations occur. Provide Cognitive Behavioral Therapy and Solution Focused Therapy to improve functioning, maintain stability, and avoid decompensation and the need for higher level of care.     Return in about 1 week, or earlier if symptoms worsen or fail to improve.           VISIT DIAGNOSIS:     ICD-10-CM ICD-9-CM   1. Severe episode of recurrent major depressive disorder, without psychotic features (HCC)  F33.2 296.33   2. JUANCARLOS (generalized anxiety disorder)  F41.1 300.02        [facility] No Show Policy:  We understand unexpected circumstances arise; however, anytime you miss your appointment we are unable to provide you appropriate care.  In addition, each appointment missed could have been used to provide care for others.  We ask that you call at least 24 hours in advance to cancel or reschedule an appointment.  We would like to take this opportunity to remind you of our policy stating patients who miss THREE or more appointments without cancelling or rescheduling 24 hours in advance of the appointment may be subject to cancellation of any further visits with our clinic and recommendation to seek in-person services/visits.    Please call [phone] to reschedule your appointment. If there are reasons that make it difficult for you to keep the appointments, please call and let us know how we can help.  Please understand that medication prescribing will not continue without seeing your provider.      [facility]'s No Show Policy reviewed with patient at today's visit. Patient verbalized understanding of this policy. Discussed with patient that in the event that there are three or more no show visits, it will be recommended that they pursue in-person services/visits as noncompliance with telehealth visits indicates that patient is not an appropriate candidate for telemedicine and would likely be more appropriate for in-person services/visits. Patient verbalizes understanding and is agreeable to this.         This document has been electronically signed by Chapincito Parker LCSW  January 11, 2023 07:13 EST     Part of this note may be an electronic transcription/translation of spoken language to printed text using the Dragon Dictation System.

## 2023-01-11 NOTE — PSYCHOTHERAPY NOTE
"\"Fuck Sidra\"  Kids were ungrateful about gifts from [employer], had to talk with them, was pissed, they were all in tears    's family undermines my parenting    Mother in laws sister, always want to know about Lavon and school, because he's the \"bowles retard\" and everyone wants to know how he's doing    He's being helped, saying new words, getting support    Maybe they don't take it like that    Had Lavon in the wrong size shirt    \"Do the fucking laundry\" all over them having the same shirts    Exercise bike for sidra, not a jab, I asked for it    Lost bag of hardware, screaming crying all day, period as well    Two weeks go by before realizing no bag of hardware, the bolts were in the bike    Conversation with  regarding health choices, thinking of the extreme, he takes it all as jab    He asked what's wrong, and when I told him, then \"Oh, so I'm a piece of shit\"    Don't even have life insurance    Then you don't go for a physical, when asking to clean up for self \"why do we have you?\"    Screamed \"I hate my life\", well go kill yourself, locked self in bathroom almost self-harmed    Interesting two weeks, house disaster, constantly wanting to sleep, depression is out the roof, migraine for three days straight and didn't take meds    When I have a moment, everything is my fault, everybody is attacking me    Nothing is the way Genesis saw it because I'm a crazy person    Decided to go to mom's wedding, going but not going to be a part of it    She still wants me to make a speech, having her proofing it first Lavon will be back in school tomorrow    Parenting son the other day, sounding just like mom    Lavon is just sweet, and nobody gets him, so grateful for that, letting him do what he wants    Things with  a little bit better    Everything piled up, disgusting, and have no will    If distracted from the thing I was doing    Doctor gave me adderral along with antipsychotics, felt like " dying, did it for two months, turned world upside down, wanted to call 911    wellbutrin large dose still sleeping through the day  Possibly interested in dietary supplement     Calorie counting, significantly eating less, 1200 down to 800-1000    Certain way I want to look, if I'm going to leave my house certain way I want to present myself    Not uncomfortable with my body until somebody addresses it    If I want to go to the beach, then have to look okay in bathing suit

## 2023-01-13 ENCOUNTER — TELEMEDICINE (OUTPATIENT)
Dept: PSYCHIATRY | Facility: CLINIC | Age: 28
End: 2023-01-13
Payer: COMMERCIAL

## 2023-01-13 DIAGNOSIS — F33.1 MODERATE EPISODE OF RECURRENT MAJOR DEPRESSIVE DISORDER: ICD-10-CM

## 2023-01-13 DIAGNOSIS — F41.1 GAD (GENERALIZED ANXIETY DISORDER): Primary | ICD-10-CM

## 2023-01-13 PROCEDURE — 90837 PSYTX W PT 60 MINUTES: CPT | Performed by: SOCIAL WORKER

## 2023-01-13 NOTE — PROGRESS NOTES
Date: January 13, 2023  Time In: 10:06  Time Out: 11:05    This provider is located at home address in connection with the [facility] (through [facility]), [address], [place] [address] using a secure [employer] Video Visit through [employer]. Patient is being seen remotely via telehealth at home address in [place] and stated they are in a secure environment for this session. The patient's condition being diagnosed/treated is appropriate for telemedicine. The provider identified himself as well as his credentials. The patient, and/or patients guardian, consent to be seen remotely, and when consent is given they understand that the consent allows for patient identifiable information to be sent to a third party as needed. They may refuse to be seen remotely at any time. The electronic data is encrypted and password protected, and the patient and/or guardian has been advised of the potential risks to privacy not withstanding such measures.  You have chosen to receive care through a telehealth visit.  Do you consent to use a video/audio connection for your medical care today? Yes    PROGRESS NOTE  Data:  Genesis Chapin is a 27 y.o. female who presents today for follow up    Chief Complaint: anxiety    History of Present Illness: Patient reports ongoing sense of being overwhelmed and then having no motivation to get anything done. Ongoing stressors related to management of own mental health issues, those of her children and their schooling needs, as well as having to navigate relationships with exes in order for kids to be able to see both their parents. Reports complexity in managing ADHD as well as anxiety/PTSD. Reports frustration with high doses of psych medications and still not feeling much better. Is noted today to be able to discuss other issues and seems to be in better routine than previous session.     Clinical Maneuvering/Intervention:  Assisted patient in  processing above session content; acknowledged and normalized patient’s thoughts, feelings, and concerns.  Rationalized patient thought process regarding feeling overwhelmed/stuck as well as self-blame/guilt  Discussed triggers associated with patient's anxiety and depression.    Allowed patient to freely discuss issues without interruption or judgment. Provided safe, confidential environment to facilitate the development of positive therapeutic relationship and encourage open, honest communication. Assisted patient in identifying risk factors which would indicate the need for higher level of care including thoughts to harm self or others and/or self-harming behavior and encouraged patient to contact this office, call 911, or present to the nearest emergency room should any of these events occur. Discussed crisis intervention services and means to access. Patient adamantly and convincingly denies current suicidal or homicidal ideation or perceptual disturbance.    Assessment:   Assessment   Patient appears to maintain relative stability as compared to their baseline.  However, patient continues to struggle with anxiety and depression which continues to cause impairment in important areas of functioning.  As a result, they can be reasonably expected to continue to benefit from treatment and would likely be at increased risk for decompensation otherwise.    Mental Status Exam:   Hygiene:   fair  Cooperation:  Cooperative  Eye Contact:  Good  Psychomotor Behavior:  Appropriate  Affect:  Blunted  Mood: depressed and anxious  Speech:  Normal  Thought Process:  Goal directed  Thought Content:  Mood congruent  Suicidal:  None  Homicidal:  None  Hallucinations:  None  Delusion:  None  Memory:  Intact  Orientation:  Grossly intact  Reliability:  fair  Insight:  Fair  Judgement:  Fair  Impulse Control:  Fair  Physical/Medical Issues:  Yes see chart       Patient's Support Network Includes:  extended family and best  friend    Functional Status: Moderate impairment     Progress toward goal: Not at goal    Prognosis: Fair with Ongoing Treatment          Plan:    Patient will continue in individual outpatient therapy with focus on improved functioning and coping skills, maintaining stability, and avoiding decompensation and the need for higher level of care.    Patient will adhere to medication regimen as prescribed and report any side effects. Patient will contact this office, call 911 or present to the nearest emergency room should suicidal or homicidal ideations occur. Provide Cognitive Behavioral Therapy and Solution Focused Therapy to improve functioning, maintain stability, and avoid decompensation and the need for higher level of care.     Return in about 1 week, or earlier if symptoms worsen or fail to improve.           VISIT DIAGNOSIS:     ICD-10-CM ICD-9-CM   1. JUANCARLOS (generalized anxiety disorder)  F41.1 300.02   2. Moderate episode of recurrent major depressive disorder (HCC)  F33.1 296.32        [facility] No Show Policy:  We understand unexpected circumstances arise; however, anytime you miss your appointment we are unable to provide you appropriate care.  In addition, each appointment missed could have been used to provide care for others.  We ask that you call at least 24 hours in advance to cancel or reschedule an appointment.  We would like to take this opportunity to remind you of our policy stating patients who miss THREE or more appointments without cancelling or rescheduling 24 hours in advance of the appointment may be subject to cancellation of any further visits with our clinic and recommendation to seek in-person services/visits.    Please call [phone] to reschedule your appointment. If there are reasons that make it difficult for you to keep the appointments, please call and let us know how we can help.  Please understand that medication prescribing will not continue without  seeing your provider.      [facility]'s No Show Policy reviewed with patient at today's visit. Patient verbalized understanding of this policy. Discussed with patient that in the event that there are three or more no show visits, it will be recommended that they pursue in-person services/visits as noncompliance with telehealth visits indicates that patient is not an appropriate candidate for telemedicine and would likely be more appropriate for in-person services/visits. Patient verbalizes understanding and is agreeable to this.         This document has been electronically signed by Chapincito Parker LCSW  January 15, 2023 22:20 EST     Part of this note may be an electronic transcription/translation of spoken language to printed text using the Dragon Dictation System.
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
## 2023-01-17 ENCOUNTER — TELEMEDICINE (OUTPATIENT)
Dept: PSYCHIATRY | Facility: CLINIC | Age: 28
End: 2023-01-17
Payer: COMMERCIAL

## 2023-01-17 DIAGNOSIS — F31.30 BIPOLAR I DISORDER, MOST RECENT EPISODE DEPRESSED: Primary | Chronic | ICD-10-CM

## 2023-01-17 DIAGNOSIS — F41.0 PANIC DISORDER (EPISODIC PAROXYSMAL ANXIETY): Chronic | ICD-10-CM

## 2023-01-17 DIAGNOSIS — F41.1 GENERALIZED ANXIETY DISORDER: Chronic | ICD-10-CM

## 2023-01-17 PROCEDURE — 99214 OFFICE O/P EST MOD 30 MIN: CPT | Performed by: NURSE PRACTITIONER

## 2023-01-17 RX ORDER — VENLAFAXINE HYDROCHLORIDE 150 MG/1
150 CAPSULE, EXTENDED RELEASE ORAL DAILY
Qty: 30 CAPSULE | Refills: 0 | Status: SHIPPED | OUTPATIENT
Start: 2023-01-17 | End: 2023-02-16 | Stop reason: SDUPTHER

## 2023-01-17 RX ORDER — BUPROPION HYDROCHLORIDE 150 MG/1
450 TABLET ORAL EVERY MORNING
Qty: 90 TABLET | Refills: 0 | Status: SHIPPED | OUTPATIENT
Start: 2023-01-17 | End: 2023-02-16 | Stop reason: SDUPTHER

## 2023-01-17 RX ORDER — BUSPIRONE HYDROCHLORIDE 15 MG/1
15 TABLET ORAL 2 TIMES DAILY
Qty: 60 TABLET | Refills: 0 | Status: SHIPPED | OUTPATIENT
Start: 2023-01-17 | End: 2023-02-16 | Stop reason: SDUPTHER

## 2023-01-17 RX ORDER — LAMOTRIGINE 100 MG/1
100 TABLET ORAL 2 TIMES DAILY
Qty: 60 TABLET | Refills: 0 | Status: SHIPPED | OUTPATIENT
Start: 2023-01-17 | End: 2023-02-16 | Stop reason: SDUPTHER

## 2023-01-17 RX ORDER — CLONIDINE HYDROCHLORIDE 0.1 MG/1
0.1 TABLET ORAL 2 TIMES DAILY PRN
Qty: 30 TABLET | Refills: 0 | Status: SHIPPED | OUTPATIENT
Start: 2023-01-17 | End: 2023-02-16

## 2023-01-17 NOTE — PROGRESS NOTES
"This provider is located at the [facility] (through [facility] [place] [facility]), [address], [place], [address] using a secure [employer] Video Visit through [employer]. Patient is being seen remotely via telehealth at their home address in [place], and stated they are in a secure environment for this session. The patient's condition being diagnosed/treated is appropriate for telemedicine. The provider identified herself as well as her credentials. The patient, and/or patients guardian, consent to be seen remotely, and when consent is given they understand that the consent allows for patient identifiable information to be sent to a third party as needed. They may refuse to be seen remotely at any time. The electronic data is encrypted and password protected, and the patient and/or guardian has been advised of the potential risks to privacy not withstanding such measures.    You have chosen to receive care through a telehealth visit.  Do you consent to use a video/audio connection for your medical care today? Yes     Patient identifiers utilized: Name and date of birth.    Patient verbally confirmed consent for today's encounter 1/17/23.          Subjective   Genesis Chapin is a 27 y.o. female who is here today for medication management follow up.    Chief Complaint: Bipolar, anxiety, panic attacks    History of Present Illness:  The patient states that things have \"not been going as great\" over the past few weeks.  She states that her mood has been fine and that the depression has been well controlled, but states that she does not feel that Wellbutrin is helping quite used to.  She states that she can \"take the Wellbutrin and go to sleep\", and endorses that it does not seem to be helping as much with energy and motivation as it had been.  She states that this has \"been a burden\" for her recently due to her \"busy lifestyle\" and keeping up with all of her children's activities.  The " "patient states that she has been discussing this with her therapist and states that her therapist endorsed her that \"he knows a little bit about medications and he recommended that I be on a stimulant\".  She states that her therapist endorsed to her that he felt that stimulant would help to improve her energy as well as her anxiety.  Provided education and discussed with the patient that stimulants are not prescribed for lack of energy or anxiety.  Also discussed with the patient that this APRN does not recommend treatment with stimulant medication due to history of substance abuse and induction of zandra with recent trial of stimulant medication from another provider.  Discussed with the patient that as she has reported her mood/depression are well controlled and she is struggling solely with fatigue, lack of energy, and lack of motivation and appears that the symptoms are occurring due to other origin rather than depressive symptoms.  Discussed with the patient that she should follow up with her PCP for further evaluation for fatigue that could possibly be contributing to the symptoms.  The patient verbalizes understanding in her own words and endorses that she is agreeable to this.  The patient reports her appetite as good.  She reports that overall she has been sleeping well.  She states that occasionally she will have an hour she does not sleep as good, such as last night, but endorses that overall sleep has been good recently.  The patient denies any nightmares or bad dreams.  The patient denies any new medical problems or changes in medications since last appointment with this facility.  The patient reports compliance with current medication regimen.  The patient denies any current side effects from her current medication regimen.  The patient denies any abnormal muscle movements or tics.  The patient rates her depression at a 4/10 on a 0-10 scale, with 10 being the worst.  The patient rates her anxiety at a " 6/10 on a 0-10 scale, with 10 being the worst.  The patient rates hopelessness at a 0/10 on a 0-10 scale with 10 being the worst.  The patient denies suicidal ideations and homicidal ideations, and is convincing.  The patient denies any auditory hallucinations or visual hallucinations.  The patient does not endorse significant symptoms consistent with a psychotic illness.              LMP:  Currently menstruating.  The patient denies any chance of pregnancy at this time.  The patient reports menstrual irregularities over the past several months, but states that this started with changes in her birth control.  The patient was educated that her prescribed medications can have potential risk to a developing fetus. The patient is advised to contact this APRN/this office if she becomes pregnant or plans to become pregnant.  Pt verbalizes understanding and acknowledged agreement with this plan in her own words.          Past Psychiatric Medications:  Lexapro 10 mg daily - effective for depression, but ineffective for anxiety   Klonopin 0.5 mg three times daily as needed for anxiety/panic  - effective but discontinued due to marijuana use  Hydroxyzine 25 mg three times daily as needed for anxiety/panic - ineffective  Seroquel - trialed 12.5 mg twice daily then 25 mg nightly.  Effective for irritability/mood swings, but caused sedation at both doses and unable to tolerate.   Propanolol 10 mg twice daily as needed for anxiety - caused sedation   Abilify 2 mg daily - partial efficacy for mood stabilization, but caused increased appetite  Adderall XR - induced zandra   Vraylar 3 mg daily - caused side effects (increase appetite/weight gain and sedation)  Effexor  mg daily - partial efficacy for depression/anxiety, but caused decreased libido  Pristiq 25 mg daily - caused excessive fatigue and increased anxiety  Wellbutrin  mg daily - effective for depression/motivation/fatigue, but caused severe exacerbation of  anxiety and destabilization of mood        The following portions of the patient's history were reviewed and updated as appropriate: allergies, current medications, past family history, past medical history, past social history, past surgical history and problem list.    Review of Systems   Constitutional: Positive for fatigue. Negative for activity change, appetite change and unexpected weight change.   Psychiatric/Behavioral: Positive for decreased concentration and dysphoric mood. Negative for agitation, behavioral problems, confusion, hallucinations, self-injury, sleep disturbance and suicidal ideas. The patient is nervous/anxious. The patient is not hyperactive.        Objective   Physical Exam  Constitutional:       Appearance: Normal appearance.   Neurological:      Mental Status: She is alert.   Psychiatric:         Attention and Perception: Attention and perception normal.         Mood and Affect: Affect normal. Mood is anxious.         Speech: Speech normal.         Behavior: Behavior normal. Behavior is cooperative.         Thought Content: Thought content normal. Thought content does not include homicidal or suicidal ideation. Thought content does not include homicidal or suicidal plan.         Cognition and Memory: Cognition and memory normal.         Judgment: Judgment normal.       unknown if currently breastfeeding.    The patient was seen remotely today via a MyChart Video Visit through [facility].  Unable to obtain vital signs due to nature of remote visit.  Height stated at 67 inches.  Weight stated at 250 pounds.       Allergies   Allergen Reactions   • Amoxicillin Nausea And Vomiting   • Penicillins Nausea And Vomiting       Recent Results (from the past 2016 hour(s))   Pregnancy, Urine -    Collection Time: 01/02/23  3:14 PM    Specimen: Urine   Result Value Ref Range    HCG, Urine QL Negative Negative, Inconclusive       Current Medications:   Current Outpatient Medications   Medication Sig Dispense  Refill   • buPROPion XL (WELLBUTRIN XL) 150 MG 24 hr tablet Take 3 tablets by mouth Every Morning. 90 tablet 0   • busPIRone (BUSPAR) 15 MG tablet Take 1 tablet by mouth 2 (Two) Times a Day. 60 tablet 0   • cloNIDine (CATAPRES) 0.1 MG tablet Take 1 tablet by mouth 2 (Two) Times a Day As Needed (anxiety/panic). 30 tablet 0   • Junel 1.5/30 1.5-30 MG-MCG tablet      • lamoTRIgine (LaMICtal) 100 MG tablet Take 1 tablet by mouth 2 (Two) Times a Day. 60 tablet 0   • venlafaxine XR (EFFEXOR-XR) 150 MG 24 hr capsule Take 1 capsule by mouth Daily. 30 capsule 0     No current facility-administered medications for this visit.       Mental Status Exam:   Hygiene:   good  Cooperation:  Cooperative  Eye Contact:  Good  Psychomotor Behavior:  Appropriate  Affect:  Full range  Hopelessness: Denies  Speech:  Normal  Thought Process:  Goal directed and Linear  Thought Content:  Normal  Suicidal:  None  Homicidal:  None  Hallucinations:  None  Delusion:  None  Memory:  Intact  Orientation:  Person, Place, Time and Situation  Reliability:  good  Insight:  Good  Judgement:  Good   Impulse Control:  Good  Physical/Medical Issues:  Yes See medical history        PHQ-2 Depression Screening  Little interest or pleasure in doing things? 2-->more than half the days   Feeling down, depressed, or hopeless? 1-->several days   PHQ-2 Total Score 3           Assessment & Plan   Diagnoses and all orders for this visit:    1. Bipolar I disorder, most recent episode depressed (HCC) (Primary)  -     buPROPion XL (WELLBUTRIN XL) 150 MG 24 hr tablet; Take 3 tablets by mouth Every Morning.  Dispense: 90 tablet; Refill: 0  -     busPIRone (BUSPAR) 15 MG tablet; Take 1 tablet by mouth 2 (Two) Times a Day.  Dispense: 60 tablet; Refill: 0  -     lamoTRIgine (LaMICtal) 100 MG tablet; Take 1 tablet by mouth 2 (Two) Times a Day.  Dispense: 60 tablet; Refill: 0  -     venlafaxine XR (EFFEXOR-XR) 150 MG 24 hr capsule; Take 1 capsule by mouth Daily.  Dispense: 30  capsule; Refill: 0    2. Generalized anxiety disorder  -     buPROPion XL (WELLBUTRIN XL) 150 MG 24 hr tablet; Take 3 tablets by mouth Every Morning.  Dispense: 90 tablet; Refill: 0  -     busPIRone (BUSPAR) 15 MG tablet; Take 1 tablet by mouth 2 (Two) Times a Day.  Dispense: 60 tablet; Refill: 0  -     cloNIDine (CATAPRES) 0.1 MG tablet; Take 1 tablet by mouth 2 (Two) Times a Day As Needed (anxiety/panic).  Dispense: 30 tablet; Refill: 0  -     venlafaxine XR (EFFEXOR-XR) 150 MG 24 hr capsule; Take 1 capsule by mouth Daily.  Dispense: 30 capsule; Refill: 0    3. Panic disorder (episodic paroxysmal anxiety)  -     busPIRone (BUSPAR) 15 MG tablet; Take 1 tablet by mouth 2 (Two) Times a Day.  Dispense: 60 tablet; Refill: 0  -     cloNIDine (CATAPRES) 0.1 MG tablet; Take 1 tablet by mouth 2 (Two) Times a Day As Needed (anxiety/panic).  Dispense: 30 tablet; Refill: 0  -     venlafaxine XR (EFFEXOR-XR) 150 MG 24 hr capsule; Take 1 capsule by mouth Daily.  Dispense: 30 capsule; Refill: 0            Visit Diagnoses:    ICD-10-CM ICD-9-CM   1. Bipolar I disorder, most recent episode depressed (HCC)  F31.30 296.50   2. Generalized anxiety disorder  F41.1 300.02   3. Panic disorder (episodic paroxysmal anxiety)  F41.0 300.01       GOALS:  Short Term Goals: Patient will be compliant with medication, and patient will have no significant medication related side effects.  Patient will be engaged in psychotherapy as indicated.  Patient will report subjective improvement of symptoms.  Long term goals: To stabilize mood and treat/improve subjective symptoms, the patient will stay out of the hospital, the patient will be at an optimal level of functioning, and the patient will take all medications as prescribed.  The patient verbalized understanding and agreement with goals that were mutually set.      SUICIDE RISK ASSESSMENT: Unalterable demographics and a history of mental health intervention indicate this patient is in a high risk  category compared to the general population. At present, the patient denies active SI/HI, intentions, or plans at this time and agrees to seek immediate care should such thoughts develop. The patient verbalizes understanding of how to access emergency care if needed and agrees to do so. Consideration of suicide risk and protective factors such as history, current presentation, individual strengths and weaknesses, psychosocial and environmental stressors and variables, psychiatric illness and symptoms, medical conditions and pain, took place in this interview. Based on those considerations, the patient is determined: within individual baseline and presenting no imminent risk for suicide or homicide. Other recommendations: The patient does not meet the criteria for inpatient admission and is not a safety risk to self or others at today's visit. Inpatient treatment offers no significant advantages over outpatient treatment for this patient at today's visit.      SAFETY PLAN:  Patient was given ample time for questions and fully participated in treatment planning.  Patient was encouraged to call the clinic with any questions or concerns.  Patient was informed of access to emergency care. If patient were to develop any significant symptomatology, suicidal ideation, homicidal ideation, any concerns, or feel unsafe at any time they are to call the clinic and if unable to get immediate assistance should immediately call 911 or go to the nearest emergency room.  The patient is advised to remove or secure (lock away) all lethal weapons (including guns) and sharps (including razors, scissors, knives, etc.).  All medications (including any prescribed and any over the counter medications) should be stored in a safe and secured location that is not obtainable by children/adolescents.  Patient was given an opportunity and encouraged to ask questions about their medication, illness, and treatment. Patient contracted verbally for  the following: If you are experiencing an emotional crisis or have thoughts of harming yourself or others, please go to your nearest local emergency room or call 911. Will continue to re-assess medication response and side effects frequently to establish efficacy and ensure safety. Risks, any black box warnings, side effects, off label usage, and benefits of medication and treatment discussed with patient, along with potential adverse side effects of current and/or newly prescribed medication, alternative treatment options, and OTC medications.  Patient verbalized understanding of potential risks, any off label use of medication, any black box warnings, and any side effects in their own words. The patient verbalized understanding and agreed to comply with the safety plan discussed in their own words.  Patient given the number to the office. Number also available to the 24- hour suicide hotline.      TREATMENT PLAN/GOALS: Continue medications and treatment plan as indicated. Treatment and medication options discussed during today's visit. Patient acknowledged and verbally consented to continue with current treatment plan and was educated on the importance of compliance with treatment and follow-up appointments.        -Continue Effexor  mg daily for mood/anxiety   -Continue Buspar 15 mg twice daily as adjunct for mood/anxiety   -Continue Wellbutrin  mg daily as adjunct for mood/depression  -Continue Lamictal 100 mg twice daily for mood stabilization/bipolar  -Continue Clonidine 0.1 mg twice daily as needed for anxiety/panic  -Continue psychotherapy with Chapincito Parker LCSW        MEDICATION ISSUES: Discussed medication options and treatment plan of prescribed medication, any off label use of medication, as well as the risks, benefits, any black box warnings including increased suicidality, and side effects including but not limited to potential falls, dizziness, possible impaired driving, GI side  effects (change in appetite, abdominal discomfort, nausea, vomiting, diarrhea, and/or constipation), dry mouth, somnolence, sedation, insomnia, activation, agitation, irritation, tremors, abnormal muscle movements or disorders, headache, sweating, possible bruising or rare bleeding, electrolyte and/or fluid abnormalities, change in blood pressure/heart rate/and or heart rhythm, sexual dysfunction, and metabolic adversities among others. Patient and/or guardian agreeable to call the office with any worsening of symptoms or onset of side effects, or if any concerns or questions arise.  The contact information for the office is made available to the patient and/or guardian.  Patient and/or guardian agreeable to call 911 or go to the nearest ER should they begin having any SI/HI, or if any urgent concerns arise. No medication side effects or related complaints today.    This APRN has discussed with the patient/guardian about the possibility of serotonin syndrome when certain medications are taken together, as is the case with this patient.  This APRN has provided the patient/guardian with a list of symptoms of serotonin syndrome including symptoms of autonomic instability, altered sensorium, confusion, restlessness, agitation, myoclonus, hyperreflexia, hyperthermia, diaphoresis, tremor, chills, diarrhea and cramps, ataxia, headache, migraines, seizures, and insomnia; which could lead to permanent hyperthermic brain damage, cardiovascular collapse, coma, or even death.  The patient/guardian are instructed to stop medications immediately and either contact this APRN/this office during regular office hours, or go to the emergency department/call 911, if they begin to experience any of the symptoms discussed.  The benefits and risks of the current medication regimen are discussed with the patient/guardian, and they feel that the benefits out weigh the risks.  The patient/guardian verbalized understanding and agreement in  their own words.    This APRN has discussed the benefits and risks of taking Lamictal (Lamotrigine).  The side effects of Lamictal can include a benign rash, blurred or double vision, dizziness, ataxia, sedation, headache, tremor, insomnia, poor coordination, fatigue,  nausea, vomiting, dyspepsia, rhinitis, infection, pharyngitis, asthenia, a rare but serious rash, rare multi-organ failure associated with Torres-Sander Syndrome, toxic epidermal necrolysis, drug hypersensitivity syndrome, rare blood dyscrasias, rare aseptic meningitis, rare sudden unexplained deaths in people with epilepsy, withdrawal seizures upon abrupt withdrawal, and rare activation of suicidal ideation and behavior (suicidality).  This APRN has discussed that a very slow dose titration when starting, or changing doses, of Lamictal may reduce the incidence of skin rash and other side effects.  The dosage should not be titrated upwards or increased faster than recommended due to the possibility of the discussed side effects and risk of development of a skin rash (which can become life threatening).    This APRN has also discussed that if the patient stops taking the Lamictal for 5 days or longer, it will be necessary to restart the drug with an initial dose titration, as rashes have been reported on reexposure.  If the patient/guardian and Provider decide to stop the Lamictal, the patient/guardian will follow the directions of this APRN/this office as a guided taper over about two weeks is appropriate due to the risk of relapse in bipolar disorder with those with bipolar disorder, the risk of seizures in those with epilepsy, and discontinuation symptoms upon rapid discontinuation of Lamictal.    The patient/guardian verbalizes understanding of benefits and risks as discussed, the patient/guardian feels the benefits outweigh the risks and is agreeable to continue/take Lamictal as discussed.  The patient/guardian is advised should any side effects  or rash develops they are to stop the Lamictal immediately and contact this APRN/this office or go to the emergency department immediately.  The patient/guardian verbalizes understanding and agreement with treatment plan in their own words.                    VERBAL INFORMED CONSENT FOR MEDICATION:  The patient was educated that their proposed/prescribed psychotropic medication(s) has potential risks, side effects, adverse effects, and black box warnings; and these have been discussed with the patient.  The patient has been informed that their treatment and medication dosage is to be individualized, and may even be above or below the recommended range/dosage due to patient individualization and response, but medication is prescribed using a shared decision making approach, and no medication or dosage will be prescribed without the patient's verbal consent.  The reason for the use of the medication including any off label use and alternative modes of treatment other than or in addition to medication has been considered and discussed, the probable consequences of not receiving the proposed treatment have been discussed, and any treatment side effects, black box warnings, and cautions associated with treatment have been discussed with the patient.  The patient is allowed ample time to openly discuss and ask questions regarding the proposed medication(s) and treatment plan and the patient verbalizes understanding the reasons for the use of the medication, its potential risks and benefits, other alternative treatment(s), and the probable consequences that may occur if the proposed medication is not given.  The patient has been given ample time to ask questions and study the information and find the information to be specific, accurate, and complete.  The patient gives verbal consent for the medication(s) proposed/prescribed, they verbalized understanding that they can refuse and withdraw consent at any time with the  assistance of this APRN, and the patient has verbally confirmed that they are aware, and are willing, to take the prescribed medication and follow the treatment plan with the known possible risks, side effect, black box warnings, and any potential medication interactions, and the patient reports they will be worse off without this medication and treatment plan.  The patient is advised to contact this APRN/this office if any questions or concerns arise at any time (at [phone]), or call 911/go to the closest emergency department if needed or outside of office hours.            [facility] No Show Policy:  We understand unexpected circumstances arise; however, anytime you miss your appointment we are unable to provide you appropriate care.  In addition, each appointment missed could have been used to provide care for others.  We ask that you call at least 24 hours in advance to cancel or reschedule an appointment.  We would like to take this opportunity to remind you of our policy stating patients who miss THREE or more appointments without cancelling or rescheduling 24 hours in advance of the appointment may be subject to cancellation of any further visits with our clinic and recommendation to seek in-person services/visits.    Please call [phone] to reschedule your appointment. If there are reasons that make it difficult for you to keep the appointments, please call and let us know how we can help.  Please understand that medication prescribing will not continue without seeing your provider.      [facility]'s No Show Policy reviewed with patient at today's visit. Patient verbalized understanding of this policy. Discussed with patient that in the event that there are three or more no show visits, it will be recommended that they pursue in-person services/visits as noncompliance with telehealth visits indicates that patient is not an appropriate candidate for  telemedicine and would likely be more appropriate for in-person services/visits. Patient verbalizes understanding and is agreeable to this.              MEDS ORDERED DURING VISIT:  New Medications Ordered This Visit   Medications   • buPROPion XL (WELLBUTRIN XL) 150 MG 24 hr tablet     Sig: Take 3 tablets by mouth Every Morning.     Dispense:  90 tablet     Refill:  0   • busPIRone (BUSPAR) 15 MG tablet     Sig: Take 1 tablet by mouth 2 (Two) Times a Day.     Dispense:  60 tablet     Refill:  0   • cloNIDine (CATAPRES) 0.1 MG tablet     Sig: Take 1 tablet by mouth 2 (Two) Times a Day As Needed (anxiety/panic).     Dispense:  30 tablet     Refill:  0   • lamoTRIgine (LaMICtal) 100 MG tablet     Sig: Take 1 tablet by mouth 2 (Two) Times a Day.     Dispense:  60 tablet     Refill:  0   • venlafaxine XR (EFFEXOR-XR) 150 MG 24 hr capsule     Sig: Take 1 capsule by mouth Daily.     Dispense:  30 capsule     Refill:  0       Return in about 4 weeks (around 2/14/2023), or if symptoms worsen or fail to improve, for Recheck.        Patient will follow-up in 4 weeks, highly encouraged the patient if she had any questions or concerns to contact the behavioral health virtual clinic for sooner appointment patient verbalized understanding.      Functional Status: Mild impairment     Prognosis: Fair with Ongoing Treatment               This document has been electronically signed by LENA Barajas  January 17, 2023 08:30 EST       Some of the data in this electronic note has been brought forward from a previous encounter, any necessary changes have been made, it has been reviewed by this APRN, and it is accurate.      Part of this note may be an electronic transcription/translation of spoken language to printed text using the Dragon Dictation System.

## 2023-01-23 ENCOUNTER — TELEMEDICINE (OUTPATIENT)
Dept: PSYCHIATRY | Facility: CLINIC | Age: 28
End: 2023-01-23
Payer: COMMERCIAL

## 2023-01-23 DIAGNOSIS — F43.10 PTSD (POST-TRAUMATIC STRESS DISORDER): ICD-10-CM

## 2023-01-23 DIAGNOSIS — F41.1 GAD (GENERALIZED ANXIETY DISORDER): Primary | ICD-10-CM

## 2023-01-23 PROCEDURE — 90834 PSYTX W PT 45 MINUTES: CPT | Performed by: SOCIAL WORKER

## 2023-01-23 NOTE — PROGRESS NOTES
Date: January 23, 2023  Time In: 15:23  Time Out: 16:04    This provider is located at home address in connection with the [facility] (through [facility]), [address], [place] [address] using a secure surespott Video Visit through [employer]. Patient is being seen remotely via telehealth at home address in [place] and stated they are in a secure environment for this session. The patient's condition being diagnosed/treated is appropriate for telemedicine. The provider identified himself as well as his credentials. The patient, and/or patients guardian, consent to be seen remotely, and when consent is given they understand that the consent allows for patient identifiable information to be sent to a third party as needed. They may refuse to be seen remotely at any time. The electronic data is encrypted and password protected, and the patient and/or guardian has been advised of the potential risks to privacy not withstanding such measures.  You have chosen to receive care through a telehealth visit.  Do you consent to use a video/audio connection for your medical care today? Yes    PROGRESS NOTE  Data:  Genesis Chapin is a 27 y.o. female who presents today for follow up    Chief Complaint: anxiety    History of Present Illness: Reports increased difficulty with stability of mood following brief lapse in psych meds due to being put on medications for a severe migraine. Reports since getting back on all of her medications feeling that they actually feel like they are more effective than they had been previously. Reports facing some increased anxiety related to trying to manage relationship between ex and her son. Also reports although looking forward to having lunch with her sons at their school tomorrow, feeling some increased anxiety in that setting, always feeling uncomfortable at that school. Reports also looking forward to getting her nails done as well as trips later  this year.     Clinical Maneuvering/Intervention:  Assisted patient in processing above session content; acknowledged and normalized patient’s thoughts, feelings, and concerns.  Rationalized patient thought process regarding feeling negative self-criticism. Discussed triggers associated with patient's anxiety and depression.    Allowed patient to freely discuss issues without interruption or judgment. Provided safe, confidential environment to facilitate the development of positive therapeutic relationship and encourage open, honest communication. Assisted patient in identifying risk factors which would indicate the need for higher level of care including thoughts to harm self or others and/or self-harming behavior and encouraged patient to contact this office, call 911, or present to the nearest emergency room should any of these events occur. Discussed crisis intervention services and means to access. Patient adamantly and convincingly denies current suicidal or homicidal ideation or perceptual disturbance.    Assessment:   Assessment   Patient appears to maintain relative stability as compared to their baseline.  However, patient continues to struggle with anxiety and depression which continues to cause impairment in important areas of functioning.  As a result, they can be reasonably expected to continue to benefit from treatment and would likely be at increased risk for decompensation otherwise.    Mental Status Exam:   Hygiene:   fair  Cooperation:  Cooperative  Eye Contact:  Good  Psychomotor Behavior:  Appropriate  Affect:  Blunted  Mood: normal and anxious  Speech:  Normal  Thought Process:  Goal directed  Thought Content:  Mood congruent  Suicidal:  None  Homicidal:  None  Hallucinations:  None  Delusion:  None  Memory:  Intact  Orientation:  Grossly intact  Reliability:  fair  Insight:  Fair  Judgement:  Fair  Impulse Control:  Fair  Physical/Medical Issues:  Yes see chart       Patient's Support Network  Includes:  extended family and best friend    Functional Status: Moderate impairment     Progress toward goal: Not at goal    Prognosis: Fair with Ongoing Treatment          Plan:    Patient will continue in individual outpatient therapy with focus on improved functioning and coping skills, maintaining stability, and avoiding decompensation and the need for higher level of care.    Patient will adhere to medication regimen as prescribed and report any side effects. Patient will contact this office, call 911 or present to the nearest emergency room should suicidal or homicidal ideations occur. Provide Cognitive Behavioral Therapy and Solution Focused Therapy to improve functioning, maintain stability, and avoid decompensation and the need for higher level of care.     Return in about 1 week, or earlier if symptoms worsen or fail to improve. Return when possible. Patient added to provider's cancellation list.              VISIT DIAGNOSIS:     ICD-10-CM ICD-9-CM   1. JUANCARLOS (generalized anxiety disorder)  F41.1 300.02   2. PTSD (post-traumatic stress disorder)  F43.10 309.81        [facility] No Show Policy:  We understand unexpected circumstances arise; however, anytime you miss your appointment we are unable to provide you appropriate care.  In addition, each appointment missed could have been used to provide care for others.  We ask that you call at least 24 hours in advance to cancel or reschedule an appointment.  We would like to take this opportunity to remind you of our policy stating patients who miss THREE or more appointments without cancelling or rescheduling 24 hours in advance of the appointment may be subject to cancellation of any further visits with our clinic and recommendation to seek in-person services/visits.    Please call [phone] to reschedule your appointment. If there are reasons that make it difficult for you to keep the appointments, please call and let us know how we  can help.  Please understand that medication prescribing will not continue without seeing your provider.      [facility]'s No Show Policy reviewed with patient at today's visit. Patient verbalized understanding of this policy. Discussed with patient that in the event that there are three or more no show visits, it will be recommended that they pursue in-person services/visits as noncompliance with telehealth visits indicates that patient is not an appropriate candidate for telemedicine and would likely be more appropriate for in-person services/visits. Patient verbalizes understanding and is agreeable to this.         This document has been electronically signed by Chapincito Parker LCSW  January 23, 2023 20:49 EST     Part of this note may be an electronic transcription/translation of spoken language to printed text using the Dragon Dictation System.

## 2023-02-16 ENCOUNTER — TELEMEDICINE (OUTPATIENT)
Dept: PSYCHIATRY | Facility: CLINIC | Age: 28
End: 2023-02-16
Payer: COMMERCIAL

## 2023-02-16 DIAGNOSIS — F41.1 GENERALIZED ANXIETY DISORDER: Chronic | ICD-10-CM

## 2023-02-16 DIAGNOSIS — F31.30 BIPOLAR I DISORDER, MOST RECENT EPISODE DEPRESSED: Primary | Chronic | ICD-10-CM

## 2023-02-16 DIAGNOSIS — F41.0 PANIC DISORDER (EPISODIC PAROXYSMAL ANXIETY): Chronic | ICD-10-CM

## 2023-02-16 PROCEDURE — 99214 OFFICE O/P EST MOD 30 MIN: CPT | Performed by: NURSE PRACTITIONER

## 2023-02-16 RX ORDER — BUSPIRONE HYDROCHLORIDE 10 MG/1
20 TABLET ORAL 2 TIMES DAILY
Qty: 120 TABLET | Refills: 0 | Status: SHIPPED | OUTPATIENT
Start: 2023-02-16

## 2023-02-16 RX ORDER — VENLAFAXINE HYDROCHLORIDE 150 MG/1
150 CAPSULE, EXTENDED RELEASE ORAL DAILY
Qty: 30 CAPSULE | Refills: 0 | Status: SHIPPED | OUTPATIENT
Start: 2023-02-16

## 2023-02-16 RX ORDER — BUPROPION HYDROCHLORIDE 150 MG/1
450 TABLET ORAL EVERY MORNING
Qty: 90 TABLET | Refills: 0 | Status: SHIPPED | OUTPATIENT
Start: 2023-02-16

## 2023-02-16 RX ORDER — LAMOTRIGINE 100 MG/1
100 TABLET ORAL 2 TIMES DAILY
Qty: 60 TABLET | Refills: 0 | Status: SHIPPED | OUTPATIENT
Start: 2023-02-16

## 2023-02-16 RX ORDER — PROPRANOLOL HYDROCHLORIDE 10 MG/1
10 TABLET ORAL 2 TIMES DAILY PRN
Qty: 60 TABLET | Refills: 0 | Status: SHIPPED | OUTPATIENT
Start: 2023-02-16

## 2023-02-16 NOTE — PROGRESS NOTES
"This provider is located at the [facility] (through [facility]), [address], [place], [address] using a secure [employer] Video Visit through [employer]. Patient is being seen remotely via telehealth at their home address in [place], and stated they are in a secure environment for this session. The patient's condition being diagnosed/treated is appropriate for telemedicine. The provider identified herself as well as her credentials. The patient, and/or patients guardian, consent to be seen remotely, and when consent is given they understand that the consent allows for patient identifiable information to be sent to a third party as needed. They may refuse to be seen remotely at any time. The electronic data is encrypted and password protected, and the patient and/or guardian has been advised of the potential risks to privacy not withstanding such measures.    You have chosen to receive care through a telehealth visit.  Do you consent to use a video/audio connection for your medical care today? Yes     Patient identifiers utilized: Name and date of birth.    Patient verbally confirmed consent for today's encounter 2/16/23.          Subjective   Genesis Chapin is a 27 y.o. female who is here today for medication management follow up.    Chief Complaint: Bipolar, anxiety, panic attacks    History of Present Illness:  The patient describes her mood as \"not great\" over the last few weeks.  The patient reports that she has been doing so well over the past few weeks.  She states that she \"had an episode\" where her depression and anxiety were significantly increased, and endorses that since then \"everything has been up and down a lot\".  She reports that her depression and anxiety have still been rather increased all the things in her anxiety has been more concerning for her recently.  She reports that she feels her anxiety has been triggering things that she endorses that she has " been very irritable and on edge.  The patient denies any symptoms consistent with recent hypomanic or manic episode.  The patient reports her appetite as good.  The patient reports her sleep as fluctuating.  Reports that currently she is sleeping approximately 4-5 hours but states that sleep is not consecutive.  She states that most of the time she is sleeping during the day while her children are in school and then staying up late at night.  The patient denies any nightmares or bad dreams.  The patient denies any new medical problems or changes in medications since last appointment with this facility.  The patient reports compliance with current medication regimen.  The patient denies any current side effects from her current medication regimen.  The patient denies any abnormal muscle movements or tics.  The patient rates her depression at a 6/10 on a 0-10 scale, with 10 being the worst.  The patient rates her anxiety at a 8/10 on a 0-10 scale, with 10 being the worst.  The patient rates hopelessness at a 0/10 on a 0-10 scale with 10 being the worst.  The patient denies suicidal ideations and homicidal ideations, and is convincing.  The patient denies any auditory hallucinations or visual hallucinations.  The patient does not endorse significant symptoms consistent with a psychotic illness.              LMP:  2-3 weeks ago.  The patient denies any chance of pregnancy at this time.  The patient was educated that her prescribed medications can have potential risk to a developing fetus. The patient is advised to contact this APRN/this office if she becomes pregnant or plans to become pregnant.  Pt verbalizes understanding and acknowledged agreement with this plan in her own words.        Past Psychiatric Medications:  Lexapro 10 mg daily - effective for depression, but ineffective for anxiety   Klonopin 0.5 mg three times daily as needed for anxiety/panic  - effective but discontinued due to marijuana use  Hydroxyzine  25 mg three times daily as needed for anxiety/panic - ineffective  Seroquel - trialed 12.5 mg twice daily then 25 mg nightly.  Effective for irritability/mood swings, but caused sedation at both doses and unable to tolerate.   Abilify 2 mg daily - partial efficacy for mood stabilization, but caused increased appetite  Adderall XR - induced zandra   Vraylar 3 mg daily - caused side effects (increase appetite/weight gain and sedation)  Effexor  mg daily - partial efficacy for depression/anxiety, but caused decreased libido  Pristiq 25 mg daily - caused excessive fatigue and increased anxiety  Wellbutrin  mg daily - effective for depression/motivation/fatigue, but caused severe exacerbation of anxiety and destabilization of mood  Propanolol 10 mg twice daily as needed for anxiety - reports caused sedation   Clonidine 0.1 mg twice daily as needed for anxiety - reports caused sedation         The following portions of the patient's history were reviewed and updated as appropriate: allergies, current medications, past family history, past medical history, past social history, past surgical history and problem list.    Review of Systems   Constitutional: Positive for activity change and fatigue. Negative for appetite change and unexpected weight change.   Psychiatric/Behavioral: Positive for decreased concentration, dysphoric mood and sleep disturbance. Negative for agitation, behavioral problems, confusion, hallucinations, self-injury and suicidal ideas. The patient is nervous/anxious. The patient is not hyperactive.        Objective   Physical Exam  Constitutional:       Appearance: Normal appearance.   Neurological:      Mental Status: She is alert.   Psychiatric:         Attention and Perception: Attention and perception normal.         Mood and Affect: Affect normal. Mood is anxious and depressed.         Speech: Speech normal.         Behavior: Behavior normal. Behavior is cooperative.         Thought  Content: Thought content normal. Thought content does not include homicidal or suicidal ideation. Thought content does not include homicidal or suicidal plan.         Cognition and Memory: Cognition and memory normal.         Judgment: Judgment normal.       unknown if currently breastfeeding.    The patient was seen remotely today via a MyChart Video Visit through [facility].  Unable to obtain vital signs due to nature of remote visit.  Height stated at 67 inches.  Weight stated at 250 pounds.       Allergies   Allergen Reactions   • Amoxicillin Nausea And Vomiting   • Penicillins Nausea And Vomiting       Recent Results (from the past 2016 hour(s))   Pregnancy, Urine -    Collection Time: 01/02/23  3:14 PM    Specimen: Urine   Result Value Ref Range    HCG, Urine QL Negative Negative, Inconclusive       Current Medications:   Current Outpatient Medications   Medication Sig Dispense Refill   • buPROPion XL (WELLBUTRIN XL) 150 MG 24 hr tablet Take 3 tablets by mouth Every Morning. 90 tablet 0   • busPIRone (BUSPAR) 10 MG tablet Take 2 tablets by mouth 2 (Two) Times a Day. 120 tablet 0   • Junel 1.5/30 1.5-30 MG-MCG tablet      • lamoTRIgine (LaMICtal) 100 MG tablet Take 1 tablet by mouth 2 (Two) Times a Day. 60 tablet 0   • venlafaxine XR (EFFEXOR-XR) 150 MG 24 hr capsule Take 1 capsule by mouth Daily. 30 capsule 0   • propranolol (INDERAL) 10 MG tablet Take 1 tablet by mouth 2 (Two) Times a Day As Needed (anxiety/panic). 60 tablet 0     No current facility-administered medications for this visit.       Mental Status Exam:   Hygiene:   good  Cooperation:  Cooperative  Eye Contact:  Good  Psychomotor Behavior:  Appropriate  Affect:  Full range  Hopelessness: Denies  Speech:  Normal  Thought Process:  Goal directed and Linear  Thought Content:  Normal  Suicidal:  None  Homicidal:  None  Hallucinations:  None  Delusion:  None  Memory:  Intact  Orientation:  Person, Place, Time and Situation  Reliability:  good  Insight:   Good  Judgement:  Good   Impulse Control:  Good  Physical/Medical Issues:  Yes See medical history          Assessment & Plan   Diagnoses and all orders for this visit:    1. Bipolar I disorder, most recent episode depressed (HCC) (Primary)  -     buPROPion XL (WELLBUTRIN XL) 150 MG 24 hr tablet; Take 3 tablets by mouth Every Morning.  Dispense: 90 tablet; Refill: 0  -     busPIRone (BUSPAR) 10 MG tablet; Take 2 tablets by mouth 2 (Two) Times a Day.  Dispense: 120 tablet; Refill: 0  -     lamoTRIgine (LaMICtal) 100 MG tablet; Take 1 tablet by mouth 2 (Two) Times a Day.  Dispense: 60 tablet; Refill: 0  -     venlafaxine XR (EFFEXOR-XR) 150 MG 24 hr capsule; Take 1 capsule by mouth Daily.  Dispense: 30 capsule; Refill: 0    2. Generalized anxiety disorder  -     buPROPion XL (WELLBUTRIN XL) 150 MG 24 hr tablet; Take 3 tablets by mouth Every Morning.  Dispense: 90 tablet; Refill: 0  -     busPIRone (BUSPAR) 10 MG tablet; Take 2 tablets by mouth 2 (Two) Times a Day.  Dispense: 120 tablet; Refill: 0  -     venlafaxine XR (EFFEXOR-XR) 150 MG 24 hr capsule; Take 1 capsule by mouth Daily.  Dispense: 30 capsule; Refill: 0  -     propranolol (INDERAL) 10 MG tablet; Take 1 tablet by mouth 2 (Two) Times a Day As Needed (anxiety/panic).  Dispense: 60 tablet; Refill: 0    3. Panic disorder (episodic paroxysmal anxiety)  -     busPIRone (BUSPAR) 10 MG tablet; Take 2 tablets by mouth 2 (Two) Times a Day.  Dispense: 120 tablet; Refill: 0  -     venlafaxine XR (EFFEXOR-XR) 150 MG 24 hr capsule; Take 1 capsule by mouth Daily.  Dispense: 30 capsule; Refill: 0  -     propranolol (INDERAL) 10 MG tablet; Take 1 tablet by mouth 2 (Two) Times a Day As Needed (anxiety/panic).  Dispense: 60 tablet; Refill: 0            Visit Diagnoses:    ICD-10-CM ICD-9-CM   1. Bipolar I disorder, most recent episode depressed (HCC)  F31.30 296.50   2. Generalized anxiety disorder  F41.1 300.02   3. Panic disorder (episodic paroxysmal anxiety)  F41.0 300.01        GOALS:  Short Term Goals: Patient will be compliant with medication, and patient will have no significant medication related side effects.  Patient will be engaged in psychotherapy as indicated.  Patient will report subjective improvement of symptoms.  Long term goals: To stabilize mood and treat/improve subjective symptoms, the patient will stay out of the hospital, the patient will be at an optimal level of functioning, and the patient will take all medications as prescribed.  The patient verbalized understanding and agreement with goals that were mutually set.      SUICIDE RISK ASSESSMENT: Unalterable demographics and a history of mental health intervention indicate this patient is in a high risk category compared to the general population. At present, the patient denies active SI/HI, intentions, or plans at this time and agrees to seek immediate care should such thoughts develop. The patient verbalizes understanding of how to access emergency care if needed and agrees to do so. Consideration of suicide risk and protective factors such as history, current presentation, individual strengths and weaknesses, psychosocial and environmental stressors and variables, psychiatric illness and symptoms, medical conditions and pain, took place in this interview. Based on those considerations, the patient is determined: within individual baseline and presenting no imminent risk for suicide or homicide. Other recommendations: The patient does not meet the criteria for inpatient admission and is not a safety risk to self or others at today's visit. Inpatient treatment offers no significant advantages over outpatient treatment for this patient at today's visit.      SAFETY PLAN:  Patient was given ample time for questions and fully participated in treatment planning.  Patient was encouraged to call the clinic with any questions or concerns.  Patient was informed of access to emergency care. If patient were to develop any  significant symptomatology, suicidal ideation, homicidal ideation, any concerns, or feel unsafe at any time they are to call the clinic and if unable to get immediate assistance should immediately call 911 or go to the nearest emergency room.  The patient is advised to remove or secure (lock away) all lethal weapons (including guns) and sharps (including razors, scissors, knives, etc.).  All medications (including any prescribed and any over the counter medications) should be stored in a safe and secured location that is not obtainable by children/adolescents.  Patient was given an opportunity and encouraged to ask questions about their medication, illness, and treatment. Patient contracted verbally for the following: If you are experiencing an emotional crisis or have thoughts of harming yourself or others, please go to your nearest local emergency room or call 911. Will continue to re-assess medication response and side effects frequently to establish efficacy and ensure safety. Risks, any black box warnings, side effects, off label usage, and benefits of medication and treatment discussed with patient, along with potential adverse side effects of current and/or newly prescribed medication, alternative treatment options, and OTC medications.  Patient verbalized understanding of potential risks, any off label use of medication, any black box warnings, and any side effects in their own words. The patient verbalized understanding and agreed to comply with the safety plan discussed in their own words.  Patient given the number to the office. Number also available to the 24- hour suicide hotline.      TREATMENT PLAN/GOALS: Continue medications and treatment plan as indicated. Treatment and medication options discussed during today's visit. Patient acknowledged and verbally consented to continue with current treatment plan and was educated on the importance of compliance with treatment and follow-up  appointments.        -Discontinue Clonidine 0.1 mg twice daily as needed for anxiety/panic due to side effects   -Begin Propanolol 10 mg twice daily as needed for anxiety/panic.  Retrialing Propanolol at this time per patient request.   -Increase Buspar to 20 mg twice daily as adjunct for mood/anxiety  -Continue Effexor  mg daily for mood/anxiety    -Continue Wellbutrin  mg daily as adjunct for mood/depression  -Continue Lamictal 100 mg twice daily for mood stabilization/bipolar  -Continue psychotherapy with Chapincito Parker LCSW        MEDICATION ISSUES: Discussed medication options and treatment plan of prescribed medication, any off label use of medication, as well as the risks, benefits, any black box warnings including increased suicidality, and side effects including but not limited to potential falls, dizziness, possible impaired driving, GI side effects (change in appetite, abdominal discomfort, nausea, vomiting, diarrhea, and/or constipation), dry mouth, somnolence, sedation, insomnia, activation, agitation, irritation, tremors, abnormal muscle movements or disorders, headache, sweating, possible bruising or rare bleeding, electrolyte and/or fluid abnormalities, change in blood pressure/heart rate/and or heart rhythm, sexual dysfunction, and metabolic adversities among others. Patient and/or guardian agreeable to call the office with any worsening of symptoms or onset of side effects, or if any concerns or questions arise.  The contact information for the office is made available to the patient and/or guardian.  Patient and/or guardian agreeable to call 911 or go to the nearest ER should they begin having any SI/HI, or if any urgent concerns arise. No medication side effects or related complaints today.    This APRN has discussed with the patient/guardian about the possibility of serotonin syndrome when certain medications are taken together, as is the case with this patient.  This APRN has  provided the patient/guardian with a list of symptoms of serotonin syndrome including symptoms of autonomic instability, altered sensorium, confusion, restlessness, agitation, myoclonus, hyperreflexia, hyperthermia, diaphoresis, tremor, chills, diarrhea and cramps, ataxia, headache, migraines, seizures, and insomnia; which could lead to permanent hyperthermic brain damage, cardiovascular collapse, coma, or even death.  The patient/guardian are instructed to stop medications immediately and either contact this APRN/this office during regular office hours, or go to the emergency department/call 911, if they begin to experience any of the symptoms discussed.  The benefits and risks of the current medication regimen are discussed with the patient/guardian, and they feel that the benefits out weigh the risks.  The patient/guardian verbalized understanding and agreement in their own words.    This APRN has discussed the benefits and risks of taking Lamictal (Lamotrigine).  The side effects of Lamictal can include a benign rash, blurred or double vision, dizziness, ataxia, sedation, headache, tremor, insomnia, poor coordination, fatigue,  nausea, vomiting, dyspepsia, rhinitis, infection, pharyngitis, asthenia, a rare but serious rash, rare multi-organ failure associated with Torres-Sander Syndrome, toxic epidermal necrolysis, drug hypersensitivity syndrome, rare blood dyscrasias, rare aseptic meningitis, rare sudden unexplained deaths in people with epilepsy, withdrawal seizures upon abrupt withdrawal, and rare activation of suicidal ideation and behavior (suicidality).  This APRN has discussed that a very slow dose titration when starting, or changing doses, of Lamictal may reduce the incidence of skin rash and other side effects.  The dosage should not be titrated upwards or increased faster than recommended due to the possibility of the discussed side effects and risk of development of a skin rash (which can become  life threatening).    This APRN has also discussed that if the patient stops taking the Lamictal for 5 days or longer, it will be necessary to restart the drug with an initial dose titration, as rashes have been reported on reexposure.  If the patient/guardian and Provider decide to stop the Lamictal, the patient/guardian will follow the directions of this APRN/this office as a guided taper over about two weeks is appropriate due to the risk of relapse in bipolar disorder with those with bipolar disorder, the risk of seizures in those with epilepsy, and discontinuation symptoms upon rapid discontinuation of Lamictal.    The patient/guardian verbalizes understanding of benefits and risks as discussed, the patient/guardian feels the benefits outweigh the risks and is agreeable to continue/take Lamictal as discussed.  The patient/guardian is advised should any side effects or rash develops they are to stop the Lamictal immediately and contact this APRN/this office or go to the emergency department immediately.  The patient/guardian verbalizes understanding and agreement with treatment plan in their own words.                    VERBAL INFORMED CONSENT FOR MEDICATION:  The patient was educated that their proposed/prescribed psychotropic medication(s) has potential risks, side effects, adverse effects, and black box warnings; and these have been discussed with the patient.  The patient has been informed that their treatment and medication dosage is to be individualized, and may even be above or below the recommended range/dosage due to patient individualization and response, but medication is prescribed using a shared decision making approach, and no medication or dosage will be prescribed without the patient's verbal consent.  The reason for the use of the medication including any off label use and alternative modes of treatment other than or in addition to medication has been considered and discussed, the probable  consequences of not receiving the proposed treatment have been discussed, and any treatment side effects, black box warnings, and cautions associated with treatment have been discussed with the patient.  The patient is allowed ample time to openly discuss and ask questions regarding the proposed medication(s) and treatment plan and the patient verbalizes understanding the reasons for the use of the medication, its potential risks and benefits, other alternative treatment(s), and the probable consequences that may occur if the proposed medication is not given.  The patient has been given ample time to ask questions and study the information and find the information to be specific, accurate, and complete.  The patient gives verbal consent for the medication(s) proposed/prescribed, they verbalized understanding that they can refuse and withdraw consent at any time with the assistance of this APRN, and the patient has verbally confirmed that they are aware, and are willing, to take the prescribed medication and follow the treatment plan with the known possible risks, side effect, black box warnings, and any potential medication interactions, and the patient reports they will be worse off without this medication and treatment plan.  The patient is advised to contact this APRN/this office if any questions or concerns arise at any time (at [phone]), or call 911/go to the closest emergency department if needed or outside of office hours.            [facility] No Show Policy:  We understand unexpected circumstances arise; however, anytime you miss your appointment we are unable to provide you appropriate care.  In addition, each appointment missed could have been used to provide care for others.  We ask that you call at least 24 hours in advance to cancel or reschedule an appointment.  We would like to take this opportunity to remind you of our policy stating patients who miss THREE or more  appointments without cancelling or rescheduling 24 hours in advance of the appointment may be subject to cancellation of any further visits with our clinic and recommendation to seek in-person services/visits.    Please call [phone] to reschedule your appointment. If there are reasons that make it difficult for you to keep the appointments, please call and let us know how we can help.  Please understand that medication prescribing will not continue without seeing your provider.      [facility]'s No Show Policy reviewed with patient at today's visit. Patient verbalized understanding of this policy. Discussed with patient that in the event that there are three or more no show visits, it will be recommended that they pursue in-person services/visits as noncompliance with telehealth visits indicates that patient is not an appropriate candidate for telemedicine and would likely be more appropriate for in-person services/visits. Patient verbalizes understanding and is agreeable to this.              MEDS ORDERED DURING VISIT:  New Medications Ordered This Visit   Medications   • buPROPion XL (WELLBUTRIN XL) 150 MG 24 hr tablet     Sig: Take 3 tablets by mouth Every Morning.     Dispense:  90 tablet     Refill:  0   • busPIRone (BUSPAR) 10 MG tablet     Sig: Take 2 tablets by mouth 2 (Two) Times a Day.     Dispense:  120 tablet     Refill:  0   • lamoTRIgine (LaMICtal) 100 MG tablet     Sig: Take 1 tablet by mouth 2 (Two) Times a Day.     Dispense:  60 tablet     Refill:  0   • venlafaxine XR (EFFEXOR-XR) 150 MG 24 hr capsule     Sig: Take 1 capsule by mouth Daily.     Dispense:  30 capsule     Refill:  0   • propranolol (INDERAL) 10 MG tablet     Sig: Take 1 tablet by mouth 2 (Two) Times a Day As Needed (anxiety/panic).     Dispense:  60 tablet     Refill:  0       Return in about 4 weeks (around 3/16/2023), or if symptoms worsen or fail to improve, for Recheck.        Patient will follow-up  in 4 weeks, highly encouraged the patient if she had any questions or concerns to contact the behavioral health [facility] clinic for sooner appointment patient verbalized understanding.      Functional Status: Moderate impairment     Prognosis: Fair with Ongoing Treatment               This document has been electronically signed by LENA Barajas  February 16, 2023 09:43 EST       Some of the data in this electronic note has been brought forward from a previous encounter, any necessary changes have been made, it has been reviewed by this APRN, and it is accurate.      Part of this note may be an electronic transcription/translation of spoken language to printed text using the Dragon Dictation System.

## 2023-02-27 ENCOUNTER — TELEMEDICINE (OUTPATIENT)
Dept: PSYCHIATRY | Facility: CLINIC | Age: 28
End: 2023-02-27
Payer: COMMERCIAL

## 2023-02-27 DIAGNOSIS — F41.1 GAD (GENERALIZED ANXIETY DISORDER): Primary | ICD-10-CM

## 2023-02-27 PROCEDURE — 90834 PSYTX W PT 45 MINUTES: CPT | Performed by: SOCIAL WORKER
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
## 2023-03-09 ENCOUNTER — OFFICE VISIT (OUTPATIENT)
Dept: PSYCHIATRY | Facility: CLINIC | Age: 28
End: 2023-03-09
Payer: COMMERCIAL

## 2023-03-09 DIAGNOSIS — F41.1 GAD (GENERALIZED ANXIETY DISORDER): ICD-10-CM

## 2023-03-09 DIAGNOSIS — F43.10 PTSD (POST-TRAUMATIC STRESS DISORDER): Primary | ICD-10-CM

## 2023-03-09 PROCEDURE — 90834 PSYTX W PT 45 MINUTES: CPT | Performed by: SOCIAL WORKER
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
## 2023-03-15 ENCOUNTER — TELEMEDICINE (OUTPATIENT)
Dept: PSYCHIATRY | Facility: CLINIC | Age: 28
End: 2023-03-15
Payer: COMMERCIAL

## 2023-03-15 DIAGNOSIS — F41.1 GAD (GENERALIZED ANXIETY DISORDER): Primary | ICD-10-CM

## 2023-03-15 PROCEDURE — 90834 PSYTX W PT 45 MINUTES: CPT | Performed by: SOCIAL WORKER

## 2023-03-15 NOTE — PROGRESS NOTES
Date: March 15, 2023  Time In: 10:06  Time Out: 10:54    This provider is located at home address in connection with the [facility]), [address], [place] [address] using a secure [employer] Video Visit through [employer]. Patient is being seen remotely via telehealth at home address in [place] and stated they are in a secure environment for this session. The patient's condition being diagnosed/treated is appropriate for telemedicine. The provider identified himself as well as his credentials. The patient, and/or patients guardian, consent to be seen remotely, and when consent is given they understand that the consent allows for patient identifiable information to be sent to a third party as needed. They may refuse to be seen remotely at any time. The electronic data is encrypted and password protected, and the patient and/or guardian has been advised of the potential risks to privacy not withstanding such measures.  You have chosen to receive care through a telehealth visit.  Do you consent to use a video/audio connection for your medical care today? Yes    PROGRESS NOTE  Data:  Genesis Chapin is a 27 y.o. female who presents today for follow up    Chief Complaint: anxiety    History of Present Illness: Reports ongoing stressors related to frequently feeling overwhelmed/overstimulated with taking care of her children, managing medical appointments, and getting over sickness in house. Reports sleeping somewhat better, being able to start making some appointments for herself and for her kids but still struggling to keep up with daily functions. Noted to be calmer today and in better mood overall than last session.    Clinical Maneuvering/Intervention:  Assisted patient in processing above session content; acknowledged and normalized patient’s thoughts, feelings, and concerns.  Rationalized patient thought process regarding being overstimulated and  impact on mood and beliefs about self related to trauma. Discussed triggers associated with patient's anxiety.    Allowed patient to freely discuss issues without interruption or judgment. Provided safe, confidential environment to facilitate the development of positive therapeutic relationship and encourage open, honest communication. Assisted patient in identifying risk factors which would indicate the need for higher level of care including thoughts to harm self or others and/or self-harming behavior and encouraged patient to contact this office, call 911, or present to the nearest emergency room should any of these events occur. Discussed crisis intervention services and means to access. Patient adamantly and convincingly denies current suicidal or homicidal ideation or perceptual disturbance.    Assessment:   Assessment   Patient appears to maintain relative stability as compared to their baseline.  However, patient continues to struggle with anxiety and depression which continues to cause impairment in important areas of functioning.  As a result, they can be reasonably expected to continue to benefit from treatment and would likely be at increased risk for decompensation otherwise.    Mental Status Exam:   Hygiene:   fair  Cooperation:  Cooperative  Eye Contact:  Good  Psychomotor Behavior:  Appropriate  Affect:  Blunted  Mood: anxious  Speech:  Normal  Thought Process:  Goal directed  Thought Content:  Mood congruent  Suicidal:  None  Homicidal:  None  Hallucinations:  None  Delusion:  None  Memory:  Intact  Orientation:  Grossly intact  Reliability:  fair  Insight:  Fair  Judgement:  Fair  Impulse Control:  Fair  Physical/Medical Issues:  Yes see chart       Patient's Support Network Includes:  extended family and best friend    Functional Status: Moderate impairment     Progress toward goal: Not at goal    Prognosis: Fair with Ongoing Treatment          Plan:    Patient will continue in individual  outpatient therapy with focus on improved functioning and coping skills, maintaining stability, and avoiding decompensation and the need for higher level of care.    Patient will adhere to medication regimen as prescribed and report any side effects. Patient will contact this office, call 911 or present to the nearest emergency room should suicidal or homicidal ideations occur. Provide Cognitive Behavioral Therapy and Solution Focused Therapy to improve functioning, maintain stability, and avoid decompensation and the need for higher level of care.     Return in about 1 week, or earlier if symptoms worsen or fail to improve.         VISIT DIAGNOSIS:     ICD-10-CM ICD-9-CM   1. JUANCARLOS (generalized anxiety disorder)  F41.1 300.02        [facility] No Show Policy:  We understand unexpected circumstances arise; however, anytime you miss your appointment we are unable to provide you appropriate care.  In addition, each appointment missed could have been used to provide care for others.  We ask that you call at least 24 hours in advance to cancel or reschedule an appointment.  We would like to take this opportunity to remind you of our policy stating patients who miss THREE or more appointments without cancelling or rescheduling 24 hours in advance of the appointment may be subject to cancellation of any further visits with our clinic and recommendation to seek in-person services/visits.    Please call [phone] to reschedule your appointment. If there are reasons that make it difficult for you to keep the appointments, please call and let us know how we can help.  Please understand that medication prescribing will not continue without seeing your provider.      [facility]'s No Show Policy reviewed with patient at today's visit. Patient verbalized understanding of this policy. Discussed with patient that in the event that there are three or more no show visits, it will be  recommended that they pursue in-person services/visits as noncompliance with telehealth visits indicates that patient is not an appropriate candidate for telemedicine and would likely be more appropriate for in-person services/visits. Patient verbalizes understanding and is agreeable to this.         This document has been electronically signed by Chapincito Parker LCSW  March 15, 2023 11:16 EDT     Part of this note may be an electronic transcription/translation of spoken language to printed text using the Dragon Dictation System.
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
## 2023-03-22 ENCOUNTER — TELEMEDICINE (OUTPATIENT)
Dept: PSYCHIATRY | Facility: CLINIC | Age: 28
End: 2023-03-22
Payer: COMMERCIAL

## 2023-03-22 DIAGNOSIS — F41.1 GAD (GENERALIZED ANXIETY DISORDER): Primary | ICD-10-CM

## 2023-03-22 PROCEDURE — 90832 PSYTX W PT 30 MINUTES: CPT | Performed by: SOCIAL WORKER

## 2023-03-22 NOTE — PROGRESS NOTES
Date: March 22, 2023  Time In: 10:11  Time Out: 10:48    This provider is located at home address in connection with the [facility] (through [facility]), [address], [place] [address] using a secure 5 Minutest Video Visit through [employer]. Patient is being seen remotely via telehealth at home address in [place] and stated they are in a secure environment for this session. The patient's condition being diagnosed/treated is appropriate for telemedicine. The provider identified himself as well as his credentials. The patient, and/or patients guardian, consent to be seen remotely, and when consent is given they understand that the consent allows for patient identifiable information to be sent to a third party as needed. They may refuse to be seen remotely at any time. The electronic data is encrypted and password protected, and the patient and/or guardian has been advised of the potential risks to privacy not withstanding such measures.  You have chosen to receive care through a telehealth visit.  Do you consent to use a video/audio connection for your medical care today? Yes    PROGRESS NOTE  Data:  Genesis Chapin is a 27 y.o. female who presents today for follow up    Chief Complaint: anxiety    History of Present Illness: Reports ongoing stressors related to difficulty with sustained focus/concentration as well as motivation. Reports often finding self worrying about how she is not going to be able to do something rather than getting up and getting that thing done. Reports hoping to be considered for medication to help possible ADHD symptoms. Reports having been off all mood stabilizers, etc. For several weeks. Reports looking forward to having support of sister next week to help manage medical appointments for self and kids.      Clinical Maneuvering/Intervention:  Assisted patient in processing above session content; acknowledged and normalized patient’s thoughts,  feelings, and concerns.  Rationalized patient thought process regarding anxiety at times being immobilizing. Discussed triggers associated with patient's anxiety.    Allowed patient to freely discuss issues without interruption or judgment. Provided safe, confidential environment to facilitate the development of positive therapeutic relationship and encourage open, honest communication. Assisted patient in identifying risk factors which would indicate the need for higher level of care including thoughts to harm self or others and/or self-harming behavior and encouraged patient to contact this office, call 911, or present to the nearest emergency room should any of these events occur. Discussed crisis intervention services and means to access. Patient adamantly and convincingly denies current suicidal or homicidal ideation or perceptual disturbance.    Assessment:   Assessment   Patient appears to maintain relative stability as compared to their baseline.  However, patient continues to struggle with anxiety and depression which continues to cause impairment in important areas of functioning.  As a result, they can be reasonably expected to continue to benefit from treatment and would likely be at increased risk for decompensation otherwise.    Mental Status Exam:   Hygiene:   fair  Cooperation:  Cooperative  Eye Contact:  Good  Psychomotor Behavior:  Appropriate  Affect:  Blunted  Mood: anxious  Speech:  Normal  Thought Process:  Goal directed  Thought Content:  Mood congruent  Suicidal:  None  Homicidal:  None  Hallucinations:  None  Delusion:  None  Memory:  Intact  Orientation:  Grossly intact  Reliability:  fair  Insight:  Fair  Judgement:  Fair  Impulse Control:  Fair  Physical/Medical Issues:  Yes see chart       Patient's Support Network Includes:  extended family and best friend    Functional Status: Moderate impairment     Progress toward goal: Not at goal    Prognosis: Fair with Ongoing Treatment           Plan:    Patient will continue in individual outpatient therapy with focus on improved functioning and coping skills, maintaining stability, and avoiding decompensation and the need for higher level of care.    Patient will adhere to medication regimen as prescribed and report any side effects. Patient will contact this office, call 911 or present to the nearest emergency room should suicidal or homicidal ideations occur. Provide Cognitive Behavioral Therapy and Solution Focused Therapy to improve functioning, maintain stability, and avoid decompensation and the need for higher level of care.     Return in about 1 month, or earlier if symptoms worsen or fail to improve.         VISIT DIAGNOSIS:     ICD-10-CM ICD-9-CM   1. JUANCARLOS (generalized anxiety disorder)  F41.1 300.02        [facility] No Show Policy:  We understand unexpected circumstances arise; however, anytime you miss your appointment we are unable to provide you appropriate care.  In addition, each appointment missed could have been used to provide care for others.  We ask that you call at least 24 hours in advance to cancel or reschedule an appointment.  We would like to take this opportunity to remind you of our policy stating patients who miss THREE or more appointments without cancelling or rescheduling 24 hours in advance of the appointment may be subject to cancellation of any further visits with our clinic and recommendation to seek in-person services/visits.    Please call [phone] to reschedule your appointment. If there are reasons that make it difficult for you to keep the appointments, please call and let us know how we can help.  Please understand that medication prescribing will not continue without seeing your provider.      [facility]'s No Show Policy reviewed with patient at today's visit. Patient verbalized understanding of this policy. Discussed with patient that in the event that  there are three or more no show visits, it will be recommended that they pursue in-person services/visits as noncompliance with telehealth visits indicates that patient is not an appropriate candidate for telemedicine and would likely be more appropriate for in-person services/visits. Patient verbalizes understanding and is agreeable to this.         This document has been electronically signed by Chapincito Parker LCSW  March 22, 2023 21:10 EDT     Part of this note may be an electronic transcription/translation of spoken language to printed text using the Dragon Dictation System.

## 2023-03-23 NOTE — PSYCHOTHERAPY NOTE
ADHD medication making drowsy, would complain about stomach ache, wouldn't eat anything    A little drowsy, a little nap (3 hours later..)    Convinced that need low dose stimulant     15 mgs     Does give me the energy, easier to be attentive to one thing at a time    When not having it, think about it and think about it, paralyzed by it     Getting so overwhelmed and drained by simple tasks, a lot of it is me talking myself out of the situation    Start off at 1000 then wear myself out so quickly    Unfortunately might have chemical dependence because of use when younger, did cocaine for a long time, ever since then, been out of balance    Could take the mood stabilizers and be a zombie    With some type of upper it gives me that normal feeling     Avoiding getting him dressed and stressing about that instead of just getting up and doing it, already think I'm going to fuck up and he's going to miss his bus.    Why do it, I'm not going to do it adequately/I'm not capable of getting kid dressed and getting him to bus on time    Both migraines brought on after mental breakdown    Guanfacine     Stressing that sister will be here Tuesday but looking forward to seeing her, excited because some relief with having appointments and someone to watch the kids
